# Patient Record
Sex: FEMALE | Race: BLACK OR AFRICAN AMERICAN | Employment: FULL TIME | URBAN - METROPOLITAN AREA
[De-identification: names, ages, dates, MRNs, and addresses within clinical notes are randomized per-mention and may not be internally consistent; named-entity substitution may affect disease eponyms.]

---

## 2017-01-27 DIAGNOSIS — M25.562 CHRONIC PAIN OF BOTH KNEES: ICD-10-CM

## 2017-01-27 DIAGNOSIS — G89.29 CHRONIC PAIN OF BOTH KNEES: ICD-10-CM

## 2017-01-27 DIAGNOSIS — M25.561 CHRONIC PAIN OF BOTH KNEES: ICD-10-CM

## 2017-01-29 DIAGNOSIS — M25.561 CHRONIC PAIN OF BOTH KNEES: ICD-10-CM

## 2017-01-29 DIAGNOSIS — M25.562 CHRONIC PAIN OF BOTH KNEES: ICD-10-CM

## 2017-01-29 DIAGNOSIS — G89.29 CHRONIC PAIN OF BOTH KNEES: ICD-10-CM

## 2017-01-30 RX ORDER — MELOXICAM 7.5 MG/1
TABLET ORAL
Qty: 60 TAB | Refills: 0 | Status: SHIPPED | OUTPATIENT
Start: 2017-01-30 | End: 2017-05-05 | Stop reason: SDUPTHER

## 2017-01-30 RX ORDER — MELOXICAM 7.5 MG/1
TABLET ORAL
Qty: 60 TAB | Refills: 0 | Status: SHIPPED | OUTPATIENT
Start: 2017-01-30 | End: 2019-06-02

## 2017-02-15 ENCOUNTER — OFFICE VISIT (OUTPATIENT)
Dept: ORTHOPEDIC SURGERY | Facility: CLINIC | Age: 52
End: 2017-02-15

## 2017-02-15 VITALS
TEMPERATURE: 97.8 F | BODY MASS INDEX: 39.88 KG/M2 | WEIGHT: 190 LBS | DIASTOLIC BLOOD PRESSURE: 80 MMHG | HEIGHT: 58 IN | HEART RATE: 89 BPM | SYSTOLIC BLOOD PRESSURE: 153 MMHG

## 2017-02-15 DIAGNOSIS — M17.0 PRIMARY OSTEOARTHRITIS OF BOTH KNEES: Primary | ICD-10-CM

## 2017-02-15 DIAGNOSIS — M17.11 ARTHRITIS OF KNEE, RIGHT: ICD-10-CM

## 2017-02-15 RX ORDER — LIDOCAINE HYDROCHLORIDE 10 MG/ML
6 INJECTION INFILTRATION; PERINEURAL ONCE
Qty: 6 ML | Refills: 0
Start: 2017-02-15 | End: 2017-02-15

## 2017-02-15 RX ORDER — TRIAMCINOLONE ACETONIDE 40 MG/ML
60 INJECTION, SUSPENSION INTRA-ARTICULAR; INTRAMUSCULAR ONCE
Qty: 2 ML | Refills: 0
Start: 2017-02-15 | End: 2017-02-15

## 2017-02-15 NOTE — PROGRESS NOTES
Patient: Paradise Taylor                MRN: [id number]       SSN: [id number]  YOB: 1965        AGE: 54 y.o. SEX: female  There is no height or weight on file to calculate BMI. PCP: Cyndee Berg MD  02/15/17      No chief complaint on file. HISTORY OF PRESENT ILLNESS: Paradise Taylor is a 54 y.o. female who presents to the office for continued in both knees but mostly in the right knee. She states the Tramadol and Motrin are no longer working. It is to be remembered she has significant medial knee arthritis seen on x-ray taken 1.5 years ago. Review of Systems   Constitutional: Negative. HENT: Negative. Eyes: Negative. Respiratory: Negative. Cardiovascular: Negative. Gastrointestinal: Negative. Genitourinary: Negative. Musculoskeletal: Positive for joint pain (bilateral knees). Skin: Negative. Neurological: Negative. Endo/Heme/Allergies: Negative. Psychiatric/Behavioral: Negative. Social History     Social History    Marital status: SINGLE     Spouse name: N/A    Number of children: N/A    Years of education: N/A     Occupational History    Not on file.      Social History Main Topics    Smoking status: Current Every Day Smoker     Packs/day: 0.25     Years: 15.00    Smokeless tobacco: Never Used    Alcohol use Yes      Comment: occasionally    Drug use: No    Sexual activity: Not on file     Other Topics Concern    Not on file     Social History Narrative        Past Medical History   Diagnosis Date    Osteoarthritis     Ulcer ([address]      gi ulcer        Allergies   Allergen Reactions    Latex Rash    Penicillins Other (comments)         Current Outpatient Prescriptions   Medication Sig    meloxicam (MOBIC) 7.5 mg tablet TAKE ONE TABLET BY MOUTH TWICE DAILY WITH MEALS    meloxicam (MOBIC) 7.5 mg tablet TAKE ONE TABLET BY MOUTH TWICE DAILY WITH MEALS    traMADol (ULTRAM) 50 mg tablet Take 1 Tab by mouth every six (6) hours as needed for Pain. Max Daily Amount: 200 mg.  acetaminophen-codeine (TYLENOL-CODEINE #3) 300-30 mg per tablet Take 1 Tab by mouth every six (6) hours as needed for Pain. Max Daily Amount: 4 Tabs.  traMADol (ULTRAM) 50 mg tablet Take 50 mg by mouth every six (6) hours as needed for Pain.  colchicine 0.6 mg tablet Take 1 Tab by mouth daily. Take 3 times daily on day 1. Then twice daily on days two and three.  omeprazole (PRILOSEC) 20 mg capsule Take 20 mg by mouth daily.  ferrous sulfate (IRON) 325 mg (65 mg iron) EC tablet Take 1 Tab by mouth two (2) times daily (with meals). No current facility-administered medications for this visit. Physical Exam  Constitutional: she is oriented to person, place, and time and well-developed, well-nourished, and in no distress. No distress. HENT:   Head: Normocephalic and atraumatic. Right Ear: Hearing normal.   Left Ear: Hearing normal.   Nose: Nose normal.   Eyes: Conjunctivae, EOM and lids are normal. Pupils are equal, round, and reactive to light. Neck: Trachea normal.   Pulmonary/Chest: Effort normal and breath sounds normal. No respiratory distress. Abdominal: Soft. Neurological: she is alert and oriented to person, place, and time. Skin: Skin is warm, dry and intact. she is not diaphoretic. Psychiatric: Affect normal.   Nursing note and vitals reviewed. Ortho Exam   Knees shows soft tissue swelling greater on the left than the left. ROM of the knee is normal.     Procedure: After timeout and under sterile conditions, patient's right knee was injected with 6 cc of Xylocaine and 1.5 cc of Kenalog.     [facility]  OFFICE PROCEDURE PROGRESS NOTE        Chart reviewed for the following:   Wale Barone MD, have reviewed the History, Physical and updated the Allergic reactions for [facility]     TIME OUT performed immediately prior to start of procedure:   Wale Barone MD, have performed the following reviews on Bandar Diaz prior to the start of the procedure:            * Patient was identified by name and date of birth   * Agreement on procedure being performed was verified  * Risks and Benefits explained to the patient  * Procedure site verified and marked as necessary  * Patient was positioned for comfort  * Consent was signed and verified     Time: 9:25 AM        Date of procedure: 2/15/2017    Procedure performed by: Manju Hussein MD    Provider assisted by: None     Patient assisted by: self    How tolerated by patient: tolerated the procedure well with no complications    Comments: none      RADIOGRAPHS:   No new x-rays taken today. IMPRESSION & PLAN: I feel the source of the pain is the OA. We will try a cortisone injection to see if this controls her pain. If not, she may have to be referred to pain management for stronger medication since at 54years old she is not ideal for knee replacement surgery. I will see her back prn.       Scribed by Vadim Wild ([address]) as dictated by Getachew Gray MD.

## 2017-02-16 ENCOUNTER — TELEPHONE (OUTPATIENT)
Dept: ORTHOPEDIC SURGERY | Age: 52
End: 2017-02-16

## 2017-02-16 NOTE — TELEPHONE ENCOUNTER
pt calling in requesting that dr Breanna Romero give her a letter stating that she will be out of work for 2 days regarding the shot that she just received. Pt wants to see if there is a certain way that the letter can be written, please call the patient and advise, thank you.

## 2017-02-20 NOTE — TELEPHONE ENCOUNTER
Per Sue Martinez, he will NOT give her a note for working on the floor 1 day a week and to get a MRI if she is having that much pain. I left a  VM for her with these instructions.

## 2017-02-20 NOTE — TELEPHONE ENCOUNTER
Patient is requesting a work note stating that she may only work on the floor 1 day a week. 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

## 2017-04-10 ENCOUNTER — OFFICE VISIT (OUTPATIENT)
Dept: ORTHOPEDIC SURGERY | Facility: CLINIC | Age: 52
End: 2017-04-10

## 2017-04-10 VITALS
HEART RATE: 77 BPM | WEIGHT: 185 LBS | SYSTOLIC BLOOD PRESSURE: 154 MMHG | BODY MASS INDEX: 38.83 KG/M2 | DIASTOLIC BLOOD PRESSURE: 65 MMHG | HEIGHT: 58 IN | TEMPERATURE: 98.1 F

## 2017-04-10 DIAGNOSIS — M25.561 CHRONIC PAIN OF BOTH KNEES: ICD-10-CM

## 2017-04-10 DIAGNOSIS — G89.29 CHRONIC PAIN OF BOTH KNEES: ICD-10-CM

## 2017-04-10 DIAGNOSIS — M25.461 KNEE EFFUSION, RIGHT: ICD-10-CM

## 2017-04-10 DIAGNOSIS — M17.11 PRIMARY OSTEOARTHRITIS OF RIGHT KNEE: Primary | ICD-10-CM

## 2017-04-10 DIAGNOSIS — M25.562 CHRONIC PAIN OF BOTH KNEES: ICD-10-CM

## 2017-04-10 DIAGNOSIS — M25.462 KNEE EFFUSION, LEFT: ICD-10-CM

## 2017-04-10 DIAGNOSIS — M17.12 PRIMARY OSTEOARTHRITIS OF LEFT KNEE: ICD-10-CM

## 2017-04-10 DIAGNOSIS — E66.9 OBESITY (BMI 35.0-39.9 WITHOUT COMORBIDITY): ICD-10-CM

## 2017-04-10 RX ORDER — BUPIVACAINE HYDROCHLORIDE 2.5 MG/ML
4 INJECTION, SOLUTION EPIDURAL; INFILTRATION; INTRACAUDAL ONCE
Qty: 4 ML | Refills: 0
Start: 2017-04-10 | End: 2017-04-10

## 2017-04-10 RX ORDER — BETAMETHASONE SODIUM PHOSPHATE AND BETAMETHASONE ACETATE 3; 3 MG/ML; MG/ML
3 INJECTION, SUSPENSION INTRA-ARTICULAR; INTRALESIONAL; INTRAMUSCULAR; SOFT TISSUE ONCE
Qty: 0.5 ML | Refills: 0
Start: 2017-04-10 | End: 2017-04-10

## 2017-04-10 RX ORDER — BUPIVACAINE HYDROCHLORIDE 2.5 MG/ML
10 INJECTION, SOLUTION EPIDURAL; INFILTRATION; INTRACAUDAL ONCE
Qty: 10 ML | Refills: 0
Start: 2017-04-10 | End: 2017-04-10

## 2017-04-10 NOTE — PROGRESS NOTES
Patient: Juanjose Dixon                MRN: [id number]       SSN: [id number]  YOB: 1965        AGE: 54 y.o. SEX: female    PCP: Josefina Goetz MD  04/10/17    Chief Complaint   Patient presents with    Knee Pain     tereza nx     HISTORY:  Juanjose Dixon is a 54 y.o. female who is seen for bilateral knee (R>L) pain and swelling. She was previously seen by Dr. Kirstin Villa in February of 2017 who gave her a knee cortisone injection with temporary benefit and prescribed Meloxicam.  She did not wish to be seen by him again since she felt like he was not listening to her. She notes increased bilateral knee pain for about a year. She notes pain with standing and walking. 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 She has high blood pressure. She is not diabetic. She reports that she has lost 25 pounds recently. Current weight is 185 pounds. She is 4'10\" tall. She lives alone in [place]. She says that her 80-year-old daughter lives across the street. She has a 51-year-old son who lives in [place], and she has a 80-year-old son who is in the [employer] and getting ready to be stationed in [place], [place] in the near future. She plans to move closer to him in July of 2017. Her oldest son has three children and her daughter has five children.       Weight Metrics 4/10/2017 2/15/2017 9/13/2016 9/12/2016 6/27/2016 6/8/2016 4/13/2016   Weight 185 lb 190 lb - 190 lb 190 lb 190 lb 196 lb   BMI 38.67 kg/m2 39.71 kg/m2 39.71 kg/m2 - 39.72 kg/m2 39.72 kg/m2 40.98 kg/m2     Patient Active Problem List   Diagnosis Code    Acute pharyngitis J02.9    Obesity (BMI 35.0-39.9 without comorbidity) ([facility] [address] E66.9     REVIEW OF SYSTEMS: All Below are Negative except: See HPI   Constitutional: negative for fever, chills, and weight loss. Cardiovascular: negative for chest pain, claudication, leg swelling, SOB, FLORES   Gastrointestinal: Negative for pain, N/V/C/D, Blood in stool or urine, dysuria,  hematuria, incontinence, pelvic pain. Musculoskeletal: See HPI   Neurological: Negative for dizziness and weakness. Negative for headaches, Visual changes, confusion, seizures   Phychiatric/Behavioral: Negative for depression, memory loss, substance  abuse. Extremities: Negative for hair changes, rash, or skin lesion changes. Hematologic: Negative for bleeding problems, bruising, pallor or swollen lymph  nodes   Peripheral Vascular: No calf pain, no circulation deficits. Social History     Social History    Marital status: SINGLE     Spouse name: N/A    Number of children: N/A    Years of education: N/A     Occupational History    Not on file. Social History Main Topics    Smoking status: Current Every Day Smoker     Packs/day: 0.25     Years: 15.00    Smokeless tobacco: Never Used    Alcohol use Yes      Comment: occasionally    Drug use: No    Sexual activity: Not on file     Other Topics Concern    Not on file     Social History Narrative      Allergies   Allergen Reactions    Latex Rash    Penicillins Other (comments)      Current Outpatient Prescriptions   Medication Sig    meloxicam (MOBIC) 7.5 mg tablet TAKE ONE TABLET BY MOUTH TWICE DAILY WITH MEALS    meloxicam (MOBIC) 7.5 mg tablet TAKE ONE TABLET BY MOUTH TWICE DAILY WITH MEALS    traMADol (ULTRAM) 50 mg tablet Take 1 Tab by mouth every six (6) hours as needed for Pain. Max Daily Amount: 200 mg.  acetaminophen-codeine (TYLENOL-CODEINE #3) 300-30 mg per tablet Take 1 Tab by mouth every six (6) hours as needed for Pain. Max Daily Amount: 4 Tabs.     traMADol (ULTRAM) 50 mg tablet Take 50 mg by mouth every six (6) hours as needed for Pain.  colchicine 0.6 mg tablet Take 1 Tab by mouth daily. Take 3 times daily on day 1. Then twice daily on days two and three.  omeprazole (PRILOSEC) 20 mg capsule Take 20 mg by mouth daily.  ferrous sulfate (IRON) 325 mg (65 mg iron) EC tablet Take 1 Tab by mouth two (2) times daily (with meals). No current facility-administered medications for this visit. PHYSICAL EXAMINATION:  Visit Vitals    /65    Pulse 77    Temp 98.1 °F (36.7 °C)    Ht 4' 10\" (1.473 m)    Wt 185 lb (83.9 kg)    BMI 38.67 kg/m2      ORTHO EXAMINATION:  Examination Right knee Left knee   Skin Intact Intact   Range of motion 100-0 100-0   Effusion + -   Medial joint line tenderness + +   Lateral joint line tenderness - -   Popliteal tenderness - -   Osteophytes palpable - -   Rajnis - -   Patella crepitus - -   Anterior drawer - -   Lateral laxity - -   Medial laxity - -   Varus deformity +, mild -   Valgus deformity - +, mild   Pretibial edema - -   Calf tenderness - -     PROCEDURE:  After timeout and under sterile conditions, Ms. Jeanne Mahmood had her right knee aspirated 35 cc of light yellow blood-tinged fluid. The fluid was discarded. After discussing treatment options, patient's knees were injected with 4 cc Marcaine and 1/2 cc Celestone.     Chart reviewed for the following:   Amparo Munroe MD, have reviewed the History, Physical and updated the Allergic reactions for Jess Willard performed immediately prior to start of procedure:  Amparo Munroe MD, have performed the following reviews on Jess Rose prior to the start of the procedure:            * Patient was identified by name and date of birth   * Agreement on procedure being performed was verified  * Risks and Benefits explained to the patient  * Procedure site verified and marked as necessary  * Patient was positioned for comfort  * Consent was obtained     Time: 8:50 AM     Date of procedure: 4/10/2017    Procedure performed by:  Mendel Mercer MD    Ms. Rose tolerated the procedure well with no complications. RADIOGRAPHS:  XR MICHAELLE KNEES 5/26/15  IMPRESSION:  No fractures, no effusion, severe medial joint space narrowing on the right, severe lateral joint space narrowing on the left, no osteophytes present. XR LEFT KNEE 3/3/15  IMPRESSION:  Moderate suprapatellar joint effusion. XR RIGHT KNEE 7/3/13  IMPRESSION:  Moderate changes of osteoarthritis. Large suprapatellar effusion. IMPRESSION:      ICD-10-CM ICD-9-CM    1. Primary osteoarthritis of right knee M17.11 715.16 betamethasone (CELESTONE SOLUSPAN) 6 mg/mL injection      BETAMETHASONE ACETATE & SODIUM PHOSPHATE INJECTION 3 MG EA.      DRAIN/INJECT LARGE JOINT/BURSA      bupivacaine, PF, (MARCAINE, PF,) 0.25 % (2.5 mg/mL) injection      bupivacaine, PF, (MARCAINE, PF,) 0.25 % (2.5 mg/mL) injection      PROCEDURE AUTHORIZATION TO     Severe, medial compartment   2. Chronic pain of both knees M25.561 719.46 betamethasone (CELESTONE SOLUSPAN) 6 mg/mL injection    M25.562 338.29 BETAMETHASONE ACETATE & SODIUM PHOSPHATE INJECTION 3 MG EA.    G89.29  DRAIN/INJECT LARGE JOINT/BURSA      bupivacaine, PF, (MARCAINE, PF,) 0.25 % (2.5 mg/mL) injection      bupivacaine, PF, (MARCAINE, PF,) 0.25 % (2.5 mg/mL) injection      betamethasone (CELESTONE SOLUSPAN) 6 mg/mL injection      BETAMETHASONE ACETATE & SODIUM PHOSPHATE INJECTION 3 MG EA.      DRAIN/INJECT LARGE JOINT/BURSA      bupivacaine, PF, (MARCAINE, PF,) 0.25 % (2.5 mg/mL) injection   3. Knee effusion, right M25.461 719.06 betamethasone (CELESTONE SOLUSPAN) 6 mg/mL injection      BETAMETHASONE ACETATE & SODIUM PHOSPHATE INJECTION 3 MG EA.      DRAIN/INJECT LARGE JOINT/BURSA      bupivacaine, PF, (MARCAINE, PF,) 0.25 % (2.5 mg/mL) injection      bupivacaine, PF, (MARCAINE, PF,) 0.25 % (2.5 mg/mL) injection    Large, suprapatellar   4.  Knee effusion, left M25.462 719.06     Moderate, suprapatellar   5. Knee effusion, right M25.461 719.06 betamethasone (CELESTONE SOLUSPAN) 6 mg/mL injection      BETAMETHASONE ACETATE & SODIUM PHOSPHATE INJECTION 3 MG EA.      DRAIN/INJECT LARGE JOINT/BURSA      bupivacaine, PF, (MARCAINE, PF,) 0.25 % (2.5 mg/mL) injection      bupivacaine, PF, (MARCAINE, PF,) 0.25 % (2.5 mg/mL) injection   6. Primary osteoarthritis of left knee M17.12 715.16 betamethasone (CELESTONE SOLUSPAN) 6 mg/mL injection      BETAMETHASONE ACETATE & SODIUM PHOSPHATE INJECTION 3 MG EA.      DRAIN/INJECT LARGE JOINT/BURSA      bupivacaine, PF, (MARCAINE, PF,) 0.25 % (2.5 mg/mL) injection      PROCEDURE AUTHORIZATION TO     Severe, lateral compartment   7. Obesity (BMI 35.0-39.9 without comorbidity) ([facility] E66.9 278.00      PLAN:  After timeout and under sterile conditions, Ms. Mosie Dakins had her right knee aspirated 35 cc of light yellow blood-tinged fluid. The fluid was discarded. After discussing treatment options, patient's knees were injected with 4 cc Marcaine and 1/2 cc Celestone. I will see her back as needed. Consider visco supplementation if pain continues. We discussed possible need for right knee arthroplasty potentially unicompartmental at some time in the future.       Scribed by [employer]PAL ([address]) as dictated by Brian Kelly MD

## 2017-04-10 NOTE — PATIENT INSTRUCTIONS
Knee Arthritis: Exercises  Your Care Instructions  Here are some examples of exercises for knee arthritis. Start each exercise slowly. Ease off the exercise if you start to have pain. Your doctor or physical therapist will tell you when you can start these exercises and which ones will work best for you. How to do the exercises  Knee flexion with heel slide    1. Lie on your back with your knees bent. 2. Slide your heel back by bending your affected knee as far as you can. Then hook your other foot around your ankle to help pull your heel even farther back. 3. Hold for about 6 seconds, then rest for up to 10 seconds. 4. Repeat 8 to 12 times. 5. Switch legs and repeat steps 1 through 4, even if only one knee is sore. Quad sets    1. Sit with your affected leg straight and supported on the floor or a firm bed. Place a small, rolled-up towel under your knee. Your other leg should be bent, with that foot flat on the floor. 2. Tighten the thigh muscles of your affected leg by pressing the back of your knee down into the towel. 3. Hold for about 6 seconds, then rest for up to 10 seconds. 4. Repeat 8 to 12 times. 5. Switch legs and repeat steps 1 through 4, even if only one knee is sore. Straight-leg raises to the front    1. Lie on your back with your good knee bent so that your foot rests flat on the floor. Your affected leg should be straight. Make sure that your low back has a normal curve. You should be able to slip your hand in between the floor and the small of your back, with your palm touching the floor and your back touching the back of your hand. 2. Tighten the thigh muscles in your affected leg by pressing the back of your knee flat down to the floor. Hold your knee straight. 3. Keeping the thigh muscles tight and your leg straight, lift your affected leg up so that your heel is about 12 inches off the floor. Hold for about 6 seconds, then lower slowly.   4. Relax for up to 10 seconds between repetitions. 5. Repeat 8 to 12 times. 6. Switch legs and repeat steps 1 through 5, even if only one knee is sore. Active knee flexion    1. Lie on your stomach with your knees straight. If your kneecap is uncomfortable, roll up a washcloth and put it under your leg just above your kneecap. 2. Lift the foot of your affected leg by bending the knee so that you bring the foot up toward your buttock. If this motion hurts, try it without bending your knee quite as far. This may help you avoid any painful motion. 3. Slowly move your leg up and down. 4. Repeat 8 to 12 times. 5. Switch legs and repeat steps 1 through 4, even if only one knee is sore. Quadriceps stretch (facedown)    1. Lie flat on your stomach, and rest your face on the floor. 2. Wrap a towel or belt strap around the lower part of your affected leg. Then use the towel or belt strap to slowly pull your heel toward your buttock until you feel a stretch. 3. Hold for about 15 to 30 seconds, then relax your leg against the towel or belt strap. 4. Repeat 2 to 4 times. 5. Switch legs and repeat steps 1 through 4, even if only one knee is sore. Stationary exercise bike    If you do not have a stationary exercise bike at home, you can find one to ride at your local health club or community center. 1. Adjust the height of the bike seat so that your knee is slightly bent when your leg is extended downward. If your knee hurts when the pedal reaches the top, you can raise the seat so that your knee does not bend as much. 2. Start slowly. At first, try to do 5 to 10 minutes of cycling with little to no resistance. Then increase your time and the resistance bit by bit until you can do 20 to 30 minutes without pain. 3. If you start to have pain, rest your knee until your pain gets back to the level that is normal for you. Or cycle for less time or with less effort. Follow-up care is a key part of your treatment and safety.  Be sure to make and go to all appointments, and call your doctor if you are having problems. It's also a good idea to know your test results and keep a list of the medicines you take. Where can you learn more? Go to http://macy-ana m.info/. Enter C159 in the search box to learn more about \"Knee Arthritis: Exercises. \"  Current as of: May 23, 2016  Content Version: 11.2  © 20062725-0295 [employer]. Care instructions adapted under license by [employer] (which disclaims liability or warranty for this information). If you have questions about a medical condition or this instruction, always ask your healthcare professional. [place] [address] any warranty or liability for your use of this information. Joint Injections: Care Instructions  Your Care Instructions  Joint injections are shots into a joint, such as the knee. They may be used to put in medicines, such as pain relievers. Or they can be used to take out fluid. Sometimes the fluid is tested in a lab. This can help find the cause of a joint problem. A corticosteroid, or steroid, shot is used to reduce inflammation in tendons or joints. It is often used to treat problems such as arthritis, tendinitis, and bursitis. Steroids can be injected directly into a painful, inflamed joint. They can also help reduce inflammation of a bursa. A bursa is a sac of fluid. It cushions and lubricates areas where tendons, ligaments, skin, muscles, or bones rub against each other. A steroid shot can sometimes help with short-term pain relief when other treatments haven't worked. If steroid shots help, pain may improve for weeks or months. Follow-up care is a key part of your treatment and safety. Be sure to make and go to all appointments, and call your doctor if you are having problems. It's also a good idea to know your test results and keep a list of the medicines you take. How can you care for yourself at home?   · Put ice or a cold pack on the area for 10 to 20 minutes at a time. Put a thin cloth between the ice and your skin. · Take anti-inflammatory medicines to reduce pain, swelling, or inflammation. These include ibuprofen (Advil, Motrin) and naproxen (Aleve). Read and follow all instructions on the label. · Avoid strenuous activities for several days, especially those that put stress on the area where you got the shot. · If you have dressings over the area, keep them clean and dry. You may remove them when your doctor tells you to. When should you call for help? Call your doctor now or seek immediate medical care if:  · You have signs of infection, such as:  ¨ Increased pain, swelling, warmth, or redness. ¨ Red streaks leading from the site. ¨ Pus draining from the site. ¨ A fever. Watch closely for changes in your health, and be sure to contact your doctor if you have any problems. Where can you learn more? Go to http://macy-ana m.info/. Enter N616 in the search box to learn more about \"Joint Injections: Care Instructions. \"  Current as of: May 23, 2016  Content Version: 11.2  © 9232-3992 [employer]. Care instructions adapted under license by [employer] (which disclaims liability or warranty for this information). If you have questions about a medical condition or this instruction, always ask your healthcare professional. [address] any warranty or liability for your use of this information.

## 2017-04-10 NOTE — MR AVS SNAPSHOT
Visit Information Date & Time Provider Department Dept. Phone Encounter #  
 4/10/2017  8:30 AM Rl Gauthier MD [facility] [id number] Follow-up Instructions Return if symptoms worsen or fail to improve. Upcoming Health Maintenance Date Due Pneumococcal 19-64 Medium Risk (1 of 1 - PPSV23) 12/26/1986 DTaP/Tdap/Td series (1 - Tdap) 12/26/1988 PAP AKA CERVICAL CYTOLOGY 12/26/1988 INFLUENZA AGE 9 TO ADULT 8/1/2016 Allergies as of 4/10/2017  Review Complete On: 4/10/2017 By: Rl Gauthier MD  
  
 Severity Noted Reaction Type Reactions Latex  09/18/2012   Topical Rash Penicillins  10/25/2014    Other (comments) Current Immunizations  Never Reviewed No immunizations on file. Not reviewed this visit You Were Diagnosed With   
  
 Codes Comments Primary osteoarthritis of right knee    -  Primary ICD-10-CM: M17.11 ICD-9-CM: 715.16 Severe, medial compartment Chronic pain of both knees     ICD-10-CM: M25.561, M25.562, G89.29 ICD-9-CM: 719.46, 338.29 Knee effusion, right     ICD-10-CM: M25.461 ICD-9-CM: 719.06 Large, suprapatellar Knee effusion, left     ICD-10-CM: M25.462 ICD-9-CM: 719.06 Moderate, suprapatellar Knee effusion, right     ICD-10-CM: M25.461 ICD-9-CM: 719.06   
 Primary osteoarthritis of left knee     ICD-10-CM: M17.12 
ICD-9-CM: 715.16 Severe, lateral compartment Obesity (BMI 35.0-39.9 without comorbidity) ([facility] [address]     ICD-10-CM: Q98.1 ICD-9-CM: 278.00 Vitals BP Pulse Temp Height(growth percentile) Weight(growth percentile) BMI  
 154/65 77 98.1 °F (36.7 °C) 4' 10\" (1.473 m) 185 lb (83.9 kg) 38.67 kg/m2 OB Status Smoking Status Having regular periods Current Every Day Smoker Vitals History BMI and BSA Data Body Mass Index Body Surface Area  
 38.67 kg/m 2 1.85 m 2 Preferred Pharmacy Pharm[facility] Name Phone [facility] [phone] Your Updated Medication List  
  
   
This list is accurate as of: 4/10/17  9:00 AM.  Always use your most recent med list.  
  
  
  
  
 acetaminophen-codeine 300-30 mg per tablet Commonly known as:  TYLENOL-CODEINE #3 Take 1 Tab by mouth every six (6) hours as needed for Pain. Max Daily Amount: 4 Tabs. * betamethasone 6 mg/mL injection Commonly known as:  CELESTONE SOLUSPAN  
0.5 mL by Intra artICUlar route once for 1 dose. * betamethasone 6 mg/mL injection Commonly known as:  CELESTONE SOLUSPAN  
0.5 mL by Intra artICUlar route once for 1 dose. * bupivacaine (PF) 0.25 % (2.5 mg/mL) injection Commonly known as:  MARCAINE (PF)  
4 mL by Intra artICUlar route once for 1 dose. * bupivacaine (PF) 0.25 % (2.5 mg/mL) injection Commonly known as:  MARCAINE (PF) 10 mL by Intra artICUlar route once for 1 dose. * bupivacaine (PF) 0.25 % (2.5 mg/mL) injection Commonly known as:  MARCAINE (PF)  
4 mL by Intra artICUlar route once for 1 dose. colchicine 0.6 mg tablet Take 1 Tab by mouth daily. Take 3 times daily on day 1. Then twice daily on days two and three. ferrous sulfate 325 mg (65 mg iron) EC tablet Commonly known as:  IRON Take 1 Tab by mouth two (2) times daily (with meals). * meloxicam 7.5 mg tablet Commonly known as:  MOBIC  
TAKE ONE TABLET BY MOUTH TWICE DAILY WITH MEALS  
  
 * meloxicam 7.5 mg tablet Commonly known as:  MOBIC  
TAKE ONE TABLET BY MOUTH TWICE DAILY WITH MEALS PriLOSEC 20 mg capsule Generic drug:  omeprazole Take 20 mg by mouth daily. * traMADol 50 mg tablet Commonly known as:  ULTRAM  
Take 50 mg by mouth every six (6) hours as needed for Pain. * traMADol 50 mg tablet Commonly known as:  ULTRAM  
Take 1 Tab by mouth every six (6) hours as needed for Pain. Max Daily Amount: 200 mg. * Notice: This list has 9 medication(s) that are the same as other medications prescribed for you. Read the directions carefully, and ask your doctor or other care provider to review them with you. We Performed the Following BETAMETHASONE ACETATE & SODIUM PHOSPHATE INJECTION 3 MG EA. [ [facility]] BETAMETHASONE ACETATE & SODIUM PHOSPHATE INJECTION 3 MG EA. [ [facility]] DRAIN/INJECT LARGE JOINT/BURSA [id number] CPT(R)] DRAIN/INJECT LARGE JOINT/BURSA [id number] CPT(R)] PROCEDURE AUTHORIZATION TO  [HBH6501 Custom] Comments:  
 Need authorization for Euflexxa, bilateral knees. INDIGENT PROGRAM. Follow-up Instructions Return if symptoms worsen or fail to improve. Patient Instructions Knee Arthritis: Exercises Your Care Instructions Here are some examples of exercises for knee arthritis. Start each exercise slowly. Ease off the exercise if you start to have pain. Your doctor or physical therapist will tell you when you can start these exercises and which ones will work best for you. How to do the exercises Knee flexion with heel slide 1. Lie on your back with your knees bent. 2. Slide your heel back by bending your affected knee as far as you can. Then hook your other foot around your ankle to help pull your heel even farther back. 3. Hold for about 6 seconds, then rest for up to 10 seconds. 4. Repeat 8 to 12 times. 5. Switch legs and repeat steps 1 through 4, even if only one knee is sore. [facility] 1. Sit with your affected leg straight and supported on the floor or a firm bed. Place a small, rolled-up towel under your knee. Your other leg should be bent, with that foot flat on the floor. 2. Tighten the thigh muscles of your affected leg by pressing the back of your knee down into the towel. 3. Hold for about 6 seconds, then rest for up to 10 seconds. 4. Repeat 8 to 12 times. 5. Switch legs and repeat steps 1 through 4, even if only one knee is sore. Straight-leg raises to the front 1. Lie on your back with your good knee bent so that your foot rests flat on the floor. Your affected leg should be straight. Make sure that your low back has a normal curve. You should be able to slip your hand in between the floor and the small of your back, with your palm touching the floor and your back touching the back of your hand. 2. Tighten the thigh muscles in your affected leg by pressing the back of your knee flat down to the floor. Hold your knee straight. 3. Keeping the thigh muscles tight and your leg straight, lift your affected leg up so that your heel is about 12 inches off the floor. Hold for about 6 seconds, then lower slowly. 4. Relax for up to 10 seconds between repetitions. 5. Repeat 8 to 12 times. 6. Switch legs and repeat steps 1 through 5, even if only one knee is sore. Active knee flexion 1. Lie on your stomach with your knees straight. If your kneecap is uncomfortable, roll up a washcloth and put it under your leg just above your kneecap. 2. Lift the foot of your affected leg by bending the knee so that you bring the foot up toward your buttock. If this motion hurts, try it without bending your knee quite as far. This may help you avoid any painful motion. 3. Slowly move your leg up and down. 4. Repeat 8 to 12 times. 5. Switch legs and repeat steps 1 through 4, even if only one knee is sore. Quadriceps stretch (facedown) 1. Lie flat on your stomach, and rest your face on the floor. 2. Wrap a towel or belt strap around the lower part of your affected leg. Then use the towel or belt strap to slowly pull your heel toward your buttock until you feel a stretch. 3. Hold for about 15 to 30 seconds, then relax your leg against the towel or belt strap. 4. Repeat 2 to 4 times. 5. Switch legs and repeat steps 1 through 4, even if only one knee is sore. Stationary exercise bike If you do not have a stationary exercise bike at home, you can find one to ride at your local health club or community center. 1. Adjust the height of the bike seat so that your knee is slightly bent when your leg is extended downward. If your knee hurts when the pedal reaches the top, you can raise the seat so that your knee does not bend as much. 2. Start slowly. At first, try to do 5 to 10 minutes of cycling with little to no resistance. Then increase your time and the resistance bit by bit until you can do 20 to 30 minutes without pain. 3. If you start to have pain, rest your knee until your pain gets back to the level that is normal for you. Or cycle for less time or with less effort. Follow-up care is a key part of your treatment and safety. Be sure to make and go to all appointments, and call your doctor if you are having problems. It's also a good idea to know your test results and keep a list of the medicines you take. Where can you learn more? Go to http://macy-ana m.info/. Enter C159 in the search box to learn more about \"Knee Arthritis: Exercises. \" Current as of: May 23, 2016 Content Version: 11.2 © 8927-7282 [employer]. Care instructions adapted under license by [employer] (which disclaims liability or warranty for this information). If you have questions about a medical condition or this instruction, always ask your healthcare professional. [place] [address] any warranty or liability for your use of this information. Joint Injections: Care Instructions Your Care Instructions Joint injections are shots into a joint, such as the knee. They may be used to put in medicines, such as pain relievers. Or they can be used to take out fluid. Sometimes the fluid is tested in a lab. This can help find the cause of a joint problem.  
A corticosteroid, or steroid, shot is used to reduce inflammation in tendons or joints. It is often used to treat problems such as arthritis, tendinitis, and bursitis. Steroids can be injected directly into a painful, inflamed joint. They can also help reduce inflammation of a bursa. A bursa is a sac of fluid. It cushions and lubricates areas where tendons, ligaments, skin, muscles, or bones rub against each other. A steroid shot can sometimes help with short-term pain relief when other treatments haven't worked. If steroid shots help, pain may improve for weeks or months. Follow-up care is a key part of your treatment and safety. Be sure to make and go to all appointments, and call your doctor if you are having problems. It's also a good idea to know your test results and keep a list of the medicines you take. How can you care for yourself at home? · Put ice or a cold pack on the area for 10 to 20 minutes at a time. Put a thin cloth between the ice and your skin. · Take anti-inflammatory medicines to reduce pain, swelling, or inflammation. These include ibuprofen (Advil, Motrin) and naproxen (Aleve). Read and follow all instructions on the label. · Avoid strenuous activities for several days, especially those that put stress on the area where you got the shot. · If you have dressings over the area, keep them clean and dry. You may remove them when your doctor tells you to. When should you call for help? Call your doctor now or seek immediate medical care if: 
· You have signs of infection, such as: 
¨ Increased pain, swelling, warmth, or redness. ¨ Red streaks leading from the site. ¨ Pus draining from the site. ¨ A fever. Watch closely for changes in your health, and be sure to contact your doctor if you have any problems. Where can you learn more? Go to http://macy-ana m.info/. Enter N616 in the search box to learn more about \"Joint Injections: Care Instructions. \" Current as of: May 23, 2016 Content Version: 11.2 © 4438-9768 [employer]. Care instructions adapted under license by [employer]d (which disclaims liability or warranty for this information). If you have questions about a medical condition or this instruction, always ask your healthcare professional. [address] any warranty or liability for your use of this information. Introducing [facility]! [facility] introduces Nexant patient portal. Now you can access parts of your medical record, email your doctor's office, and request medication refills online. 1. In your internet browser, go to [email or web address] 2. Click on the First Time User? Click Here link in the Sign In box. You will see the New Member Sign Up page. 3. Enter your Nexant Access Code exactly as it appears below. You will not need to use this code after youve completed the sign-up process. If you do not sign up before the expiration date, you must request a new code. · Nexant Access Code: [email or web address] Expires: 7/9/2017  8:32 AM 
 
4. Enter the last four digits of your Social Security Number (xxxx) and Date of Birth (mm/dd/yyyy) as indicated and click Submit. You will be taken to the next sign-up page. 5. Create a Nexant ID. This will be your Nexant login ID and cannot be changed, so think of one that is secure and easy to remember. 6. Create a Nexant password. You can change your password at any time. 7. Enter your Password Reset Question and Answer. This can be used at a later time if you forget your password. 8. Enter your e-mail address. You will receive e-mail notification when new information is available in [address] 9. Click Sign Up. You can now view and download portions of your medical record. 10. Click the Download Summary menu link to download a portable copy of your medical information.  
 
If you have questions, please visit the Frequently Asked Questions section of the [employer]. Remember, [employer] is NOT to be used for urgent needs. For medical emergencies, dial 911. Now available from your iPhone and Android! Please provide this summary of care documentation to your next provider. Your primary care clinician is listed as [address] If you have any questions after today's visit, please call [phone].

## 2017-04-10 NOTE — LETTER
4/10/2017 8:58 AM 
 
Ms. Moreno [address] 
[address] PLEASE EXCUSE THE ABOVE PATIENT FROM WORK TODAY.  
 
 
Sincerely, 
 
 
Lary Mcmullen MD

## 2017-05-04 DIAGNOSIS — M25.561 CHRONIC PAIN OF BOTH KNEES: ICD-10-CM

## 2017-05-04 DIAGNOSIS — G89.29 CHRONIC PAIN OF BOTH KNEES: ICD-10-CM

## 2017-05-04 DIAGNOSIS — M25.562 CHRONIC PAIN OF BOTH KNEES: ICD-10-CM

## 2017-05-04 NOTE — TELEPHONE ENCOUNTER
Please review Pinky's message below and advise. Would you like to patient to continue Ultram and Mobic as previously prescribed by Dr. Curtis Mares? Last Visit: 04/10/2017 with MD Patricia Link    Next Appointment: noted to f/u as needed     meloxicam (MOBIC) 7.5 mg tablet 60 Tab 0 1/30/2017     Sig: TAKE ONE TABLET BY MOUTH TWICE DAILY WITH MEALS      traMADol (ULTRAM) 50 mg tablet 60 Tab 2 11/1/2016     Sig - Route: Take 1 Tab by mouth every six (6) hours as needed for Pain.  Max Daily Amount: 200 mg. - Oral

## 2017-05-04 NOTE — TELEPHONE ENCOUNTER
Pt called in requesting medication for pain in her leg. Pt states she was up al night tossing and turning because of pain. Please advise patient at [phone]. Pt would like Rx sent to [address] [facility] [address] [facility] in [facility].

## 2017-05-05 RX ORDER — TRAMADOL HYDROCHLORIDE 50 MG/1
50 TABLET ORAL
Qty: 60 TAB | Refills: 2 | OUTPATIENT
Start: 2017-05-05 | End: 2017-07-18 | Stop reason: SDUPTHER

## 2017-05-05 RX ORDER — MELOXICAM 7.5 MG/1
7.5 TABLET ORAL 2 TIMES DAILY
Qty: 60 TAB | Refills: 1 | Status: SHIPPED | OUTPATIENT
Start: 2017-05-05 | End: 2017-09-21 | Stop reason: SDUPTHER

## 2017-05-05 NOTE — TELEPHONE ENCOUNTER
Orders pending as previously prescribed. Requested Prescriptions     Pending Prescriptions Disp Refills    traMADol (ULTRAM) 50 mg tablet 60 Tab 2     Sig: Take 1 Tab by mouth every six (6) hours as needed for Pain. Max Daily Amount: 200 mg.    meloxicam (MOBIC) 7.5 mg tablet 60 Tab 1     Sig: Take 1 Tab by mouth two (2) times a day.

## 2017-05-19 ENCOUNTER — OFFICE VISIT (OUTPATIENT)
Dept: ORTHOPEDIC SURGERY | Facility: CLINIC | Age: 52
End: 2017-05-19

## 2017-05-19 VITALS
TEMPERATURE: 99.1 F | BODY MASS INDEX: 41.73 KG/M2 | WEIGHT: 198.8 LBS | DIASTOLIC BLOOD PRESSURE: 67 MMHG | SYSTOLIC BLOOD PRESSURE: 128 MMHG | HEIGHT: 58 IN | HEART RATE: 93 BPM

## 2017-05-19 DIAGNOSIS — G89.29 CHRONIC PAIN OF BOTH KNEES: ICD-10-CM

## 2017-05-19 DIAGNOSIS — M25.462 KNEE EFFUSION, LEFT: ICD-10-CM

## 2017-05-19 DIAGNOSIS — M17.11 PRIMARY OSTEOARTHRITIS OF RIGHT KNEE: Primary | ICD-10-CM

## 2017-05-19 DIAGNOSIS — M25.461 KNEE EFFUSION, RIGHT: ICD-10-CM

## 2017-05-19 DIAGNOSIS — M17.12 PRIMARY OSTEOARTHRITIS OF LEFT KNEE: ICD-10-CM

## 2017-05-19 DIAGNOSIS — M25.561 CHRONIC PAIN OF BOTH KNEES: ICD-10-CM

## 2017-05-19 DIAGNOSIS — M25.562 CHRONIC PAIN OF BOTH KNEES: ICD-10-CM

## 2017-05-19 NOTE — PATIENT INSTRUCTIONS
Knee Arthritis: Exercises  Your Care Instructions  Here are some examples of exercises for knee arthritis. Start each exercise slowly. Ease off the exercise if you start to have pain. Your doctor or physical therapist will tell you when you can start these exercises and which ones will work best for you. How to do the exercises  Knee flexion with heel slide    1. Lie on your back with your knees bent. 2. Slide your heel back by bending your affected knee as far as you can. Then hook your other foot around your ankle to help pull your heel even farther back. 3. Hold for about 6 seconds, then rest for up to 10 seconds. 4. Repeat 8 to 12 times. 5. Switch legs and repeat steps 1 through 4, even if only one knee is sore. Quad sets    1. Sit with your affected leg straight and supported on the floor or a firm bed. Place a small, rolled-up towel under your knee. Your other leg should be bent, with that foot flat on the floor. 2. Tighten the thigh muscles of your affected leg by pressing the back of your knee down into the towel. 3. Hold for about 6 seconds, then rest for up to 10 seconds. 4. Repeat 8 to 12 times. 5. Switch legs and repeat steps 1 through 4, even if only one knee is sore. Straight-leg raises to the front    1. Lie on your back with your good knee bent so that your foot rests flat on the floor. Your affected leg should be straight. Make sure that your low back has a normal curve. You should be able to slip your hand in between the floor and the small of your back, with your palm touching the floor and your back touching the back of your hand. 2. Tighten the thigh muscles in your affected leg by pressing the back of your knee flat down to the floor. Hold your knee straight. 3. Keeping the thigh muscles tight and your leg straight, lift your affected leg up so that your heel is about 12 inches off the floor. Hold for about 6 seconds, then lower slowly.   4. Relax for up to 10 seconds between repetitions. 5. Repeat 8 to 12 times. 6. Switch legs and repeat steps 1 through 5, even if only one knee is sore. Active knee flexion    1. Lie on your stomach with your knees straight. If your kneecap is uncomfortable, roll up a washcloth and put it under your leg just above your kneecap. 2. Lift the foot of your affected leg by bending the knee so that you bring the foot up toward your buttock. If this motion hurts, try it without bending your knee quite as far. This may help you avoid any painful motion. 3. Slowly move your leg up and down. 4. Repeat 8 to 12 times. 5. Switch legs and repeat steps 1 through 4, even if only one knee is sore. Quadriceps stretch (facedown)    1. Lie flat on your stomach, and rest your face on the floor. 2. Wrap a towel or belt strap around the lower part of your affected leg. Then use the towel or belt strap to slowly pull your heel toward your buttock until you feel a stretch. 3. Hold for about 15 to 30 seconds, then relax your leg against the towel or belt strap. 4. Repeat 2 to 4 times. 5. Switch legs and repeat steps 1 through 4, even if only one knee is sore. Stationary exercise bike    If you do not have a stationary exercise bike at home, you can find one to ride at your local health club or community center. 1. Adjust the height of the bike seat so that your knee is slightly bent when your leg is extended downward. If your knee hurts when the pedal reaches the top, you can raise the seat so that your knee does not bend as much. 2. Start slowly. At first, try to do 5 to 10 minutes of cycling with little to no resistance. Then increase your time and the resistance bit by bit until you can do 20 to 30 minutes without pain. 3. If you start to have pain, rest your knee until your pain gets back to the level that is normal for you. Or cycle for less time or with less effort. Follow-up care is a key part of your treatment and safety.  Be sure to make and go to all appointments, and call your doctor if you are having problems. It's also a good idea to know your test results and keep a list of the medicines you take. Where can you learn more? Go to http://macy-ana m.info/. Enter C159 in the search box to learn more about \"Knee Arthritis: Exercises. \"  Current as of: May 23, 2016  Content Version: 11.2  © 2006-2017 [employer]. Care instructions adapted under license by [employer] (which disclaims liability or warranty for this information). If you have questions about a medical condition or this instruction, always ask your healthcare professional. [address] any warranty or liability for your use of this information. Hyaluronic Acid (By injection)   Hyaluronic Acid (fya-dw-xvh-ON-ate AS-id)  Treats severe pain in your knee due to osteoarthritis. Brand Name(s): Euflexxa, Gel-One, GelSyn-3, GenVisc 850, Hyalgan, Hymovis, Monovisc, Orthovisc, Supartz FX   There may be other brand names for this medicine. When This Medicine Should Not Be Used: This medicine is not right for everyone. You should not receive it if you had an allergic reaction to hyaluronic acid or if you have a bleeding disorder. How to Use This Medicine:   Injectable  · Your doctor will tell you how many injections you will need. This medicine is injected into your knee joint. · A nurse or other health provider will give you this medicine. Drugs and Foods to Avoid:      Ask your doctor or pharmacist before using any other medicine, including over-the-counter medicines, vitamins, and herbal products. Warnings While Using This Medicine:   · Tell your doctor if you are pregnant or breastfeeding, or if you have any allergies, including to birds, feathers, or eggs. · Rest your knee for 48 hours after you receive an injection. Do not do strenuous, weightbearing activities, such as jogging or tennis.  Avoid activities that keep you standing for longer than 1 hour. Possible Side Effects While Using This Medicine:   Call your doctor right away if you notice any of these side effects:  · Allergic reaction: Itching or hives, swelling in your face or hands, swelling or tingling in your mouth or throat, chest tightness, trouble breathing  If you notice these less serious side effects, talk with your doctor:   · Mild increase in pain or swelling in your knee  · Pain, redness, or swelling where the medicine is injected  If you notice other side effects that you think are caused by this medicine, tell your doctor. Call your doctor for medical advice about side effects. You may report side effects to FDA at 6-534-FDA-7243  © 2017 [address] Information is for End User's use only and may not be sold, redistributed or otherwise used for commercial purposes. The above information is an  only. It is not intended as medical advice for individual conditions or treatments. Talk to your doctor, nurse or pharmacist before following any medical regimen to see if it is safe and effective for you.

## 2017-05-19 NOTE — PROGRESS NOTES
Patient: Heather Oppenheim                MRN: [id number]       SSN: [id number]  YOB: 1965        AGE: 54 y.o. SEX: female    PCP: Gricelda Butcher MD  05/19/17    Chief Complaint   Patient presents with    Knee Pain     Right     HISTORY:  Heather Oppenheim is a 54 y.o. female who is seen for bilateral knee (R>L) pain and swelling. She was previously seen by Dr. Lucas Freeman in February of 2017 who gave her a knee cortisone injection with temporary benefit and prescribed Mobic. She did not wish to be seen by him again since she felt like he was not listening to her. She notes increased bilateral knee pain for about a year. She notes pain with standing and walking. 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 She has high blood pressure. She is not diabetic. She reports that she has lost 15 pounds recently by cutting back on greasy foods and drinking more water. Current weight is 198 pounds. She is 4'10\" tall. She lives alone in [place]. She says that her 79-year-old daughter lives across the street. She has a 79-year-old son who lives in [place], and she has a 79-year-old son who is in the [employer] and getting ready to be stationed in [place], [place] in the near future after he is done OCS in [place] and a 1-year deployment in [place]. She plans to move closer to him in July of 2017. Her oldest son has three children and her daughter has five children. She walks regularly for exercise. She is allergic to latex.       Weight Metrics 5/19/2017 4/10/2017 2/15/2017 9/13/2016 9/12/2016 6/27/2016 6/8/2016   Weight 198 lb 12.8 oz 185 lb 190 lb - 190 lb 190 lb 190 lb   BMI 41.55 kg/m2 38.67 kg/m2 39.71 kg/m2 39.71 kg/m2 - 39.72 kg/m2 39.72 kg/m2     Patient Active Problem List   Diagnosis Code    Acute pharyngitis J02.9    Obesity (BMI 35.0-39.9 without comorbidity) ([facility]) E66.9    BMI 38.0-38.9,adult Z68.38    BMI 40.0-44.9, adult ([facility]) Z68.41     REVIEW OF SYSTEMS: All Below are Negative except: See HPI   Constitutional: negative for fever, chills, and weight loss. Cardiovascular: negative for chest pain, claudication, leg swelling, SOB, FLORES   Gastrointestinal: Negative for pain, N/V/C/D, Blood in stool or urine, dysuria,  hematuria, incontinence, pelvic pain. Musculoskeletal: See HPI   Neurological: Negative for dizziness and weakness. Negative for headaches, Visual changes, confusion, seizures   Phychiatric/Behavioral: Negative for depression, memory loss, substance  abuse. Extremities: Negative for hair changes, rash, or skin lesion changes. Hematologic: Negative for bleeding problems, bruising, pallor or swollen lymph  nodes   Peripheral Vascular: No calf pain, no circulation deficits. Social History     Social History    Marital status: SINGLE     Spouse name: N/A    Number of children: N/A    Years of education: N/A     Occupational History    Not on file. Social History Main Topics    Smoking status: Current Every Day Smoker     Packs/day: 0.25     Years: 15.00    Smokeless tobacco: Never Used    Alcohol use Yes      Comment: occasionally    Drug use: No    Sexual activity: Not on file     Other Topics Concern    Not on file     Social History Narrative      Allergies   Allergen Reactions    Latex Rash    Penicillins Other (comments)      Current Outpatient Prescriptions   Medication Sig    traMADol (ULTRAM) 50 mg tablet Take 1 Tab by mouth every six (6) hours as needed for Pain. Max Daily Amount: 200 mg.    meloxicam (MOBIC) 7.5 mg tablet Take 1 Tab by mouth two (2) times a day.     meloxicam (MOBIC) 7.5 mg tablet TAKE ONE TABLET BY MOUTH TWICE DAILY WITH MEALS    acetaminophen-codeine (TYLENOL-CODEINE #3) 300-30 mg per tablet Take 1 Tab by mouth every six (6) hours as needed for Pain. Max Daily Amount: 4 Tabs.  traMADol (ULTRAM) 50 mg tablet Take 50 mg by mouth every six (6) hours as needed for Pain.  colchicine 0.6 mg tablet Take 1 Tab by mouth daily. Take 3 times daily on day 1. Then twice daily on days two and three.  omeprazole (PRILOSEC) 20 mg capsule Take 20 mg by mouth daily.  ferrous sulfate (IRON) 325 mg (65 mg iron) EC tablet Take 1 Tab by mouth two (2) times daily (with meals). No current facility-administered medications for this visit. PHYSICAL EXAMINATION:  Visit Vitals    /67    Pulse 93    Temp 99.1 °F (37.3 °C) (Oral)    Ht 4' 10\" (1.473 m)    Wt 198 lb 12.8 oz (90.2 kg)    BMI 41.55 kg/m2      ORTHO EXAMINATION:  Examination Right knee Left knee   Skin Intact Intact   Range of motion 100-0 100-0   Effusion + -   Medial joint line tenderness + +   Lateral joint line tenderness - -   Popliteal tenderness - -   Osteophytes palpable + +   Rajnis - -   Patella crepitus + +   Anterior drawer - -   Lateral laxity - -   Medial laxity - -   Varus deformity +, mild -   Valgus deformity - +, mild   Pretibial edema - -   Calf tenderness - -     PROCEDURE:  After timeout and under sterile conditions, Ms. Tiera Hernández had her right knee aspirated 35 cc of light yellow blood-tinged fluid. The fluid was discarded. After discussing treatment options, patient's knees were injected with 2 cc of Synvisc of her own supply:   LOT NO: [id number]   EXP.  DATE: 2019-03    Chart reviewed for the following:   Hermila Persaud MD, have reviewed the History, Physical and updated the Allergic reactions for [facility]     TIME OUT performed immediately prior to start of procedure:  Hermila Persaud MD, have performed the following reviews on Jess Rose prior to the start of the procedure:            * Patient was identified by name and date of birth   * Agreement on procedure being performed was verified  * Risks and Benefits explained to the patient  * Procedure site verified and marked as necessary  * Patient was positioned for comfort  * Consent was obtained     Time: 11:53 AM     Date of procedure: 5/19/2017    Procedure performed by:  Shannon Rivera MD    Ms. Rose tolerated the procedure well with no complications. RADIOGRAPHS:  XR MICHAELLE KNEES 5/26/15  IMPRESSION:  No fractures, no effusion, severe medial joint space narrowing on the right, severe lateral joint space narrowing on the left, no osteophytes present. XR LEFT KNEE 3/3/15  IMPRESSION:  Moderate suprapatellar joint effusion. XR RIGHT KNEE 7/3/13  IMPRESSION:  Moderate changes of osteoarthritis. Large suprapatellar effusion. IMPRESSION:      ICD-10-CM ICD-9-CM    1. Primary osteoarthritis of right knee M17.11 715.16 DRAIN/INJECT LARGE JOINT/BURSA    Severe, medial compartment   2. Primary osteoarthritis of left knee M17.12 715.16 DRAIN/INJECT LARGE JOINT/BURSA    Severe, lateral compartment   3. Chronic pain of both knees M25.561 719.46 DRAIN/INJECT LARGE JOINT/BURSA    M25.562 338.29 DRAIN/INJECT LARGE JOINT/BURSA    G89.29     4. Knee effusion, right M25.461 719.06    5. Knee effusion, left M25.462 719.06    6. BMI 40.0-44.9, adult [facility] Z68.41 V85.41      PLAN:  After timeout and under sterile conditions, Ms. Nara Youngblood had her right knee aspirated 35 cc of light yellow blood-tinged fluid. The fluid was discarded. After discussing treatment options, patient's knees were injected with 2 cc of Synvisc of her own supply. I will see her back in 1 week for her second Synvisc sample injections. We discussed possible need for right knee arthroplasty potentially unicompartmental at some time in the future pending weight loss.       Scribed by [employer] ([address]) as dictated by [facility]. Yong Lopez MD

## 2017-05-26 ENCOUNTER — OFFICE VISIT (OUTPATIENT)
Dept: ORTHOPEDIC SURGERY | Facility: CLINIC | Age: 52
End: 2017-05-26

## 2017-05-26 VITALS
TEMPERATURE: 98.6 F | HEIGHT: 58 IN | DIASTOLIC BLOOD PRESSURE: 74 MMHG | HEART RATE: 78 BPM | SYSTOLIC BLOOD PRESSURE: 129 MMHG | BODY MASS INDEX: 41.56 KG/M2 | WEIGHT: 198 LBS

## 2017-05-26 DIAGNOSIS — M25.462 KNEE EFFUSION, LEFT: ICD-10-CM

## 2017-05-26 DIAGNOSIS — M17.11 PRIMARY OSTEOARTHRITIS OF RIGHT KNEE: Primary | ICD-10-CM

## 2017-05-26 DIAGNOSIS — M25.562 CHRONIC PAIN OF BOTH KNEES: ICD-10-CM

## 2017-05-26 DIAGNOSIS — M25.461 KNEE EFFUSION, RIGHT: ICD-10-CM

## 2017-05-26 DIAGNOSIS — G89.29 CHRONIC PAIN OF BOTH KNEES: ICD-10-CM

## 2017-05-26 DIAGNOSIS — M25.561 CHRONIC PAIN OF BOTH KNEES: ICD-10-CM

## 2017-05-26 DIAGNOSIS — M17.12 PRIMARY OSTEOARTHRITIS OF LEFT KNEE: ICD-10-CM

## 2017-05-26 NOTE — PATIENT INSTRUCTIONS
Knee Arthritis: Exercises  Your Care Instructions  Here are some examples of exercises for knee arthritis. Start each exercise slowly. Ease off the exercise if you start to have pain. Your doctor or physical therapist will tell you when you can start these exercises and which ones will work best for you. How to do the exercises  Knee flexion with heel slide    1. Lie on your back with your knees bent. 2. Slide your heel back by bending your affected knee as far as you can. Then hook your other foot around your ankle to help pull your heel even farther back. 3. Hold for about 6 seconds, then rest for up to 10 seconds. 4. Repeat 8 to 12 times. 5. Switch legs and repeat steps 1 through 4, even if only one knee is sore. Quad sets    1. Sit with your affected leg straight and supported on the floor or a firm bed. Place a small, rolled-up towel under your knee. Your other leg should be bent, with that foot flat on the floor. 2. Tighten the thigh muscles of your affected leg by pressing the back of your knee down into the towel. 3. Hold for about 6 seconds, then rest for up to 10 seconds. 4. Repeat 8 to 12 times. 5. Switch legs and repeat steps 1 through 4, even if only one knee is sore. Straight-leg raises to the front    1. Lie on your back with your good knee bent so that your foot rests flat on the floor. Your affected leg should be straight. Make sure that your low back has a normal curve. You should be able to slip your hand in between the floor and the small of your back, with your palm touching the floor and your back touching the back of your hand. 2. Tighten the thigh muscles in your affected leg by pressing the back of your knee flat down to the floor. Hold your knee straight. 3. Keeping the thigh muscles tight and your leg straight, lift your affected leg up so that your heel is about 12 inches off the floor. Hold for about 6 seconds, then lower slowly.   4. Relax for up to 10 seconds between repetitions. 5. Repeat 8 to 12 times. 6. Switch legs and repeat steps 1 through 5, even if only one knee is sore. Active knee flexion    1. Lie on your stomach with your knees straight. If your kneecap is uncomfortable, roll up a washcloth and put it under your leg just above your kneecap. 2. Lift the foot of your affected leg by bending the knee so that you bring the foot up toward your buttock. If this motion hurts, try it without bending your knee quite as far. This may help you avoid any painful motion. 3. Slowly move your leg up and down. 4. Repeat 8 to 12 times. 5. Switch legs and repeat steps 1 through 4, even if only one knee is sore. Quadriceps stretch (facedown)    1. Lie flat on your stomach, and rest your face on the floor. 2. Wrap a towel or belt strap around the lower part of your affected leg. Then use the towel or belt strap to slowly pull your heel toward your buttock until you feel a stretch. 3. Hold for about 15 to 30 seconds, then relax your leg against the towel or belt strap. 4. Repeat 2 to 4 times. 5. Switch legs and repeat steps 1 through 4, even if only one knee is sore. Stationary exercise bike    If you do not have a stationary exercise bike at home, you can find one to ride at your local health club or community center. 1. Adjust the height of the bike seat so that your knee is slightly bent when your leg is extended downward. If your knee hurts when the pedal reaches the top, you can raise the seat so that your knee does not bend as much. 2. Start slowly. At first, try to do 5 to 10 minutes of cycling with little to no resistance. Then increase your time and the resistance bit by bit until you can do 20 to 30 minutes without pain. 3. If you start to have pain, rest your knee until your pain gets back to the level that is normal for you. Or cycle for less time or with less effort. Follow-up care is a key part of your treatment and safety.  Be sure to make and go to all appointments, and call your doctor if you are having problems. It's also a good idea to know your test results and keep a list of the medicines you take. Where can you learn more? Go to http://macy-ana m.info/. Enter C159 in the search box to learn more about \"Knee Arthritis: Exercises. \"  Current as of: May 23, 2016  Content Version: 11.2  © 2006-2017 [employer]. Care instructions adapted under license by [employer] (which disclaims liability or warranty for this information). If you have questions about a medical condition or this instruction, always ask your healthcare professional. [address] any warranty or liability for your use of this information. Hyaluronic Acid (By injection)   Hyaluronic Acid (men-qo-vvm-ON-ate AS-id)  Treats severe pain in your knee due to osteoarthritis. Brand Name(s): Euflexxa, Gel-One, GelSyn-3, GenVisc 850, Hyalgan, Hymovis, Monovisc, Orthovisc, Supartz FX   There may be other brand names for this medicine. When This Medicine Should Not Be Used: This medicine is not right for everyone. You should not receive it if you had an allergic reaction to hyaluronic acid or if you have a bleeding disorder. How to Use This Medicine:   Injectable  · Your doctor will tell you how many injections you will need. This medicine is injected into your knee joint. · A nurse or other health provider will give you this medicine. Drugs and Foods to Avoid:      Ask your doctor or pharmacist before using any other medicine, including over-the-counter medicines, vitamins, and herbal products. Warnings While Using This Medicine:   · Tell your doctor if you are pregnant or breastfeeding, or if you have any allergies, including to birds, feathers, or eggs. · Rest your knee for 48 hours after you receive an injection. Do not do strenuous, weightbearing activities, such as jogging or tennis.  Avoid activities that keep you standing for longer than 1 hour. Possible Side Effects While Using This Medicine:   Call your doctor right away if you notice any of these side effects:  · Allergic reaction: Itching or hives, swelling in your face or hands, swelling or tingling in your mouth or throat, chest tightness, trouble breathing  If you notice these less serious side effects, talk with your doctor:   · Mild increase in pain or swelling in your knee  · Pain, redness, or swelling where the medicine is injected  If you notice other side effects that you think are caused by this medicine, tell your doctor. Call your doctor for medical advice about side effects. You may report side effects to FDA at 9-855-FDA-2564  © 2017 [facility] [place][note 2] Information is for End User's use only and may not be sold, redistributed or otherwise used for commercial purposes. The above information is an  only. It is not intended as medical advice for individual conditions or treatments. Talk to your doctor, nurse or pharmacist before following any medical regimen to see if it is safe and effective for you.

## 2017-05-26 NOTE — MR AVS SNAPSHOT
Visit Information Date & Time Provider Department Dept. Phone Encounter #  
 5/26/2017 11:30 AM Marcia Hurtado, [address] [facility] [place] [phone] Follow-up Instructions Return in about 1 week (around 6/2/2017). Your Appointments 6/2/2017 11:30 AM  
Follow Up with Marcia Hurtado MD  
[address] [facility], [address] [facility] - [place]) Appt Note: SYNVISC-MICHAELLE KNEES-1/3/DO NOT BILL FOR MEDS  
 [address] [facility], [address] [place] [id number] [phone]  
  
   
 [address] [place], [address] [facility] [address] Upcoming Health Maintenance Date Due Pneumococcal 19-64 Medium Risk (1 of 1 - PPSV23) 12/26/1986 DTaP/Tdap/Td series (1 - Tdap) 12/26/1988 PAP AKA CERVICAL CYTOLOGY 12/26/1988 INFLUENZA AGE 9 TO ADULT 8/1/2017 Allergies as of 5/26/2017  Review Complete On: 5/26/2017 By: Luke Candelario Severity Noted Reaction Type Reactions Latex  09/18/2012   Topical Rash Penicillins  10/25/2014    Other (comments) Current Immunizations  Never Reviewed No immunizations on file. Not reviewed this visit You Were Diagnosed With   
  
 Codes Comments Primary osteoarthritis of right knee    -  Primary ICD-10-CM: M17.11 ICD-9-CM: 715.16 Primary osteoarthritis of left knee     ICD-10-CM: M17.12 
ICD-9-CM: 715.16 Chronic pain of both knees     ICD-10-CM: M25.561, M25.562, G89.29 ICD-9-CM: 719.46, 338.29 Knee effusion, right     ICD-10-CM: M25.461 ICD-9-CM: 719.06 Knee effusion, left     ICD-10-CM: M25.462 ICD-9-CM: 719.06 Vitals BP Pulse Temp Height(growth percentile) Weight(growth percentile) BMI  
 129/74 78 98.6 °F (37 °C) (Oral) 4' 10\" (1.473 m) 198 lb (89.8 kg) 41.38 kg/m2 OB Status Smoking Status Having regular periods Current Every Day Smoker BMI and BSA Data Body Mass Index Body Surface Area 41.38 kg/m 2 1.92 m 2 Preferred Pharmacy Pharmacy Name Phone [facility] [address]. [phone] Your Updated Medication List  
  
   
This list is accurate as of: 5/26/17 11:36 AM.  Always use your most recent med list.  
  
  
  
  
 acetaminophen-codeine 300-30 mg per tablet Commonly known as:  TYLENOL-CODEINE #3 Take 1 Tab by mouth every six (6) hours as needed for Pain. Max Daily Amount: 4 Tabs. colchicine 0.6 mg tablet Take 1 Tab by mouth daily. Take 3 times daily on day 1. Then twice daily on days two and three. ferrous sulfate 325 mg (65 mg iron) EC tablet Commonly known as:  IRON Take 1 Tab by mouth two (2) times daily (with meals). * meloxicam 7.5 mg tablet Commonly known as:  MOBIC  
TAKE ONE TABLET BY MOUTH TWICE DAILY WITH MEALS  
  
 * meloxicam 7.5 mg tablet Commonly known as:  MOBIC Take 1 Tab by mouth two (2) times a day. PriLOSEC 20 mg capsule Generic drug:  omeprazole Take 20 mg by mouth daily. * traMADol 50 mg tablet Commonly known as:  ULTRAM  
Take 50 mg by mouth every six (6) hours as needed for Pain. * traMADol 50 mg tablet Commonly known as:  ULTRAM  
Take 1 Tab by mouth every six (6) hours as needed for Pain. Max Daily Amount: 200 mg.  
  
 * Notice: This list has 4 medication(s) that are the same as other medications prescribed for you. Read the directions carefully, and ask your doctor or other care provider to review them with you. Follow-up Instructions Return in about 1 week (around 6/2/2017). Patient Instructions Knee Arthritis: Exercises Your Care Instructions Here are some examples of exercises for knee arthritis. Start each exercise slowly. Ease off the exercise if you start to have pain. Your doctor or physical therapist will tell you when you can start these exercises and which ones will work best for you. How to do the exercises Knee flexion with heel slide 1. Lie on your back with your knees bent. 2. Slide your heel back by bending your affected knee as far as you can. Then hook your other foot around your ankle to help pull your heel even farther back. 3. Hold for about 6 seconds, then rest for up to 10 seconds. 4. Repeat 8 to 12 times. 5. Switch legs and repeat steps 1 through 4, even if only one knee is sore. [facility] Ulabox 1. Sit with your affected leg straight and supported on the floor or a firm bed. Place a small, rolled-up towel under your knee. Your other leg should be bent, with that foot flat on the floor. 2. Tighten the thigh muscles of your affected leg by pressing the back of your knee down into the towel. 3. Hold for about 6 seconds, then rest for up to 10 seconds. 4. Repeat 8 to 12 times. 5. Switch legs and repeat steps 1 through 4, even if only one knee is sore. Straight-leg raises to the front 1. Lie on your back with your good knee bent so that your foot rests flat on the floor. Your affected leg should be straight. Make sure that your low back has a normal curve. You should be able to slip your hand in between the floor and the small of your back, with your palm touching the floor and your back touching the back of your hand. 2. Tighten the thigh muscles in your affected leg by pressing the back of your knee flat down to the floor. Hold your knee straight. 3. Keeping the thigh muscles tight and your leg straight, lift your affected leg up so that your heel is about 12 inches off the floor. Hold for about 6 seconds, then lower slowly. 4. Relax for up to 10 seconds between repetitions. 5. Repeat 8 to 12 times. 6. Switch legs and repeat steps 1 through 5, even if only one knee is sore. Active knee flexion 1. Lie on your stomach with your knees straight. If your kneecap is uncomfortable, roll up a washcloth and put it under your leg just above your kneecap. 2. Lift the foot of your affected leg by bending the knee so that you bring the foot up toward your buttock. If this motion hurts, try it without bending your knee quite as far. This may help you avoid any painful motion. 3. Slowly move your leg up and down. 4. Repeat 8 to 12 times. 5. Switch legs and repeat steps 1 through 4, even if only one knee is sore. Quadriceps stretch (facedown) 1. Lie flat on your stomach, and rest your face on the floor. 2. Wrap a towel or belt strap around the lower part of your affected leg. Then use the towel or belt strap to slowly pull your heel toward your buttock until you feel a stretch. 3. Hold for about 15 to 30 seconds, then relax your leg against the towel or belt strap. 4. Repeat 2 to 4 times. 5. Switch legs and repeat steps 1 through 4, even if only one knee is sore. Stationary exercise bike If you do not have a stationary exercise bike at home, you can find one to ride at your local health club or community center. 1. Adjust the height of the bike seat so that your knee is slightly bent when your leg is extended downward. If your knee hurts when the pedal reaches the top, you can raise the seat so that your knee does not bend as much. 2. Start slowly. At first, try to do 5 to 10 minutes of cycling with little to no resistance. Then increase your time and the resistance bit by bit until you can do 20 to 30 minutes without pain. 3. If you start to have pain, rest your knee until your pain gets back to the level that is normal for you. Or cycle for less time or with less effort. Follow-up care is a key part of your treatment and safety. Be sure to make and go to all appointments, and call your doctor if you are having problems. It's also a good idea to know your test results and keep a list of the medicines you take. Where can you learn more? Go to http://macy-ana m.info/. Enter C159 in the search box to learn more about \"Knee Arthritis: Exercises. \" Current as of: May 23, 2016 Content Version: 11.2 © 1501-4188 [employer] Care instructions adapted under license by [employer] (which disclaims liability or warranty for this information). If you have questions about a medical condition or this instruction, always ask your healthcare professional. [address] any warranty or liability for your use of this information. Hyaluronic Acid (By injection) Hyaluronic Acid (rcw-fc-hdc-ON-ate AS-id) Treats severe pain in your knee due to osteoarthritis. Brand Name(s): Euflexxa, Gel-One, GelSyn-3, GenVisc 850, Hyalgan, Hymovis, Monovisc, Orthovisc, Supartz FX There may be other brand names for this medicine. When This Medicine Should Not Be Used: This medicine is not right for everyone. You should not receive it if you had an allergic reaction to hyaluronic acid or if you have a bleeding disorder. How to Use This Medicine:  
Injectable · Your doctor will tell you how many injections you will need. This medicine is injected into your knee joint. · A nurse or other health provider will give you this medicine. Drugs and Foods to Avoid: Ask your doctor or pharmacist before using any other medicine, including over-the-counter medicines, vitamins, and herbal products. Warnings While Using This Medicine: · Tell your doctor if you are pregnant or breastfeeding, or if you have any allergies, including to birds, feathers, or eggs. · Rest your knee for 48 hours after you receive an injection. Do not do strenuous, weightbearing activities, such as jogging or tennis. Avoid activities that keep you standing for longer than 1 hour. Possible Side Effects While Using This Medicine:  
Call your doctor right away if you notice any of these side effects: · Allergic reaction: Itching or hives, swelling in your face or hands, swelling or tingling in your mouth or throat, chest tightness, trouble breathing If you notice these less serious side effects, talk with your doctor: · Mild increase in pain or swelling in your knee · Pain, redness, or swelling where the medicine is injected If you notice other side effects that you think are caused by this medicine, tell your doctor. Call your doctor for medical advice about side effects. You may report side effects to FDA at 0-608-FEV-9850 © 2017 [address] Information is for End User's use only and may not be sold, redistributed or otherwise used for commercial purposes. The above information is an  only. It is not intended as medical advice for individual conditions or treatments. Talk to your doctor, nurse or pharmacist before following any medical regimen to see if it is safe and effective for you. Introducing [facility]! [address] [facility] introduces [employer] patient portal. Now you can access parts of your medical record, email your doctor's office, and request medication refills online. 1. In your internet browser, go to [email or web address] 2. Click on the First Time User? Click Here link in the Sign In box. You will see the New Member Sign Up page. 3. Enter your [employer] Access Code exactly as it appears below. You will not need to use this code after youve completed the sign-up process. If you do not sign up before the expiration date, you must request a new code. · [employer] Access Code: [email or web address] Expires: 7/9/2017  8:32 AM 
 
4. Enter the last four digits of your Social Security Number (xxxx) and Date of Birth (mm/dd/yyyy) as indicated and click Submit. You will be taken to the next sign-up page. 5. Create a [employer] ID. This will be your [employer] login ID and cannot be changed, so think of one that is secure and easy to remember. 6. Create a [employer] password. You can change your password at any time. 7. Enter your Password Reset Question and Answer. This can be used at a later time if you forget your password. 8. Enter your e-mail address. You will receive e-mail notification when new information is available in [address] 9. Click Sign Up. You can now view and download portions of your medical record. 10. Click the Download Summary menu link to download a portable copy of your medical information. If you have questions, please visit the Frequently Asked Questions section of the [employer] website. Remember, [employer] is NOT to be used for urgent needs. For medical emergencies, dial 911. Now available from your iPhone and Android! Please provide this summary of care documentation to your next provider. Your primary care clinician is listed as [address] If you have any questions after today's visit, please call [phone].

## 2017-05-26 NOTE — PROGRESS NOTES
Patient: Antonette Garcia                MRN: [id number]       SSN: [id number]  YOB: 1965        AGE: 54 y.o. SEX: female  Body mass index is 41.38 kg/(m^2). PCP: Georgiana Bustamante MD  05/26/17    Chief Complaint   Patient presents with    Knee Pain     bilat, Synvisc #2     HISTORY:  Antonette Garcia is a 54 y.o. female who is seen for bilateral knee pain. ICD-10-CM ICD-9-CM    1. Primary osteoarthritis of right knee M17.11 715.16 DRAIN/INJECT LARGE JOINT/BURSA   2. Primary osteoarthritis of left knee M17.12 715.16 DRAIN/INJECT LARGE JOINT/BURSA   3. Chronic pain of both knees M25.561 719.46 DRAIN/INJECT LARGE JOINT/BURSA    M25.562 338.29 DRAIN/INJECT LARGE JOINT/BURSA    G89.29     4. Knee effusion, right M25.461 719.06    5. Knee effusion, left M25.462 719.06      PROCEDURE:  After discussing treatment options, Ms. Rose's knees were injected with 2 cc of Synvisc of her own supply:   LOT NO.: [id number]   EXP. DATE: 2019-03-31     Chart reviewed for the following:   Isabela Chery MD, have reviewed the History, Physical and updated the Allergic reactions for Jess Willard performed immediately prior to start of procedure:  Isabela Chery MD, have performed the following reviews on Jess Rose prior to the start of the procedure:            * Patient was identified by name and date of birth   * Agreement on procedure being performed was verified  * Risks and Benefits explained to the patient  * Procedure site verified and marked as necessary  * Patient was positioned for comfort  * Consent was obtained     Time: 11:36 AM     Date of procedure: 5/26/2017    Procedure performed by:  Jessee Dakin, MD    Ms. Rose tolerated the procedure well with no complications. PLAN:  After discussing treatment options, patient's knees were injected with 2 cc of Synvisc of her own supply.   Ms. Yvette Whitman will follow up in one week to complete her Euflexxa injection series.       Scribed by [employer] ([facility]) as dictated by Anna Shaw MD

## 2017-06-02 ENCOUNTER — OFFICE VISIT (OUTPATIENT)
Dept: ORTHOPEDIC SURGERY | Facility: CLINIC | Age: 52
End: 2017-06-02

## 2017-06-02 VITALS
DIASTOLIC BLOOD PRESSURE: 81 MMHG | HEIGHT: 58 IN | SYSTOLIC BLOOD PRESSURE: 123 MMHG | BODY MASS INDEX: 41.35 KG/M2 | HEART RATE: 99 BPM | WEIGHT: 197 LBS

## 2017-06-02 DIAGNOSIS — M25.462 KNEE EFFUSION, LEFT: ICD-10-CM

## 2017-06-02 DIAGNOSIS — G89.29 CHRONIC PAIN OF BOTH KNEES: ICD-10-CM

## 2017-06-02 DIAGNOSIS — M25.562 CHRONIC PAIN OF BOTH KNEES: ICD-10-CM

## 2017-06-02 DIAGNOSIS — M25.461 KNEE EFFUSION, RIGHT: ICD-10-CM

## 2017-06-02 DIAGNOSIS — M25.561 CHRONIC PAIN OF BOTH KNEES: ICD-10-CM

## 2017-06-02 DIAGNOSIS — M17.12 PRIMARY OSTEOARTHRITIS OF LEFT KNEE: ICD-10-CM

## 2017-06-02 DIAGNOSIS — M17.11 PRIMARY OSTEOARTHRITIS OF RIGHT KNEE: Primary | ICD-10-CM

## 2017-06-02 NOTE — PATIENT INSTRUCTIONS
Knee Arthritis: Exercises  Your Care Instructions  Here are some examples of exercises for knee arthritis. Start each exercise slowly. Ease off the exercise if you start to have pain. Your doctor or physical therapist will tell you when you can start these exercises and which ones will work best for you. How to do the exercises  Knee flexion with heel slide    1. Lie on your back with your knees bent. 2. Slide your heel back by bending your affected knee as far as you can. Then hook your other foot around your ankle to help pull your heel even farther back. 3. Hold for about 6 seconds, then rest for up to 10 seconds. 4. Repeat 8 to 12 times. 5. Switch legs and repeat steps 1 through 4, even if only one knee is sore. Quad sets    1. Sit with your affected leg straight and supported on the floor or a firm bed. Place a small, rolled-up towel under your knee. Your other leg should be bent, with that foot flat on the floor. 2. Tighten the thigh muscles of your affected leg by pressing the back of your knee down into the towel. 3. Hold for about 6 seconds, then rest for up to 10 seconds. 4. Repeat 8 to 12 times. 5. Switch legs and repeat steps 1 through 4, even if only one knee is sore. Straight-leg raises to the front    1. Lie on your back with your good knee bent so that your foot rests flat on the floor. Your affected leg should be straight. Make sure that your low back has a normal curve. You should be able to slip your hand in between the floor and the small of your back, with your palm touching the floor and your back touching the back of your hand. 2. Tighten the thigh muscles in your affected leg by pressing the back of your knee flat down to the floor. Hold your knee straight. 3. Keeping the thigh muscles tight and your leg straight, lift your affected leg up so that your heel is about 12 inches off the floor. Hold for about 6 seconds, then lower slowly.   4. Relax for up to 10 seconds between repetitions. 5. Repeat 8 to 12 times. 6. Switch legs and repeat steps 1 through 5, even if only one knee is sore. Active knee flexion    1. Lie on your stomach with your knees straight. If your kneecap is uncomfortable, roll up a washcloth and put it under your leg just above your kneecap. 2. Lift the foot of your affected leg by bending the knee so that you bring the foot up toward your buttock. If this motion hurts, try it without bending your knee quite as far. This may help you avoid any painful motion. 3. Slowly move your leg up and down. 4. Repeat 8 to 12 times. 5. Switch legs and repeat steps 1 through 4, even if only one knee is sore. Quadriceps stretch (facedown)    1. Lie flat on your stomach, and rest your face on the floor. 2. Wrap a towel or belt strap around the lower part of your affected leg. Then use the towel or belt strap to slowly pull your heel toward your buttock until you feel a stretch. 3. Hold for about 15 to 30 seconds, then relax your leg against the towel or belt strap. 4. Repeat 2 to 4 times. 5. Switch legs and repeat steps 1 through 4, even if only one knee is sore. Stationary exercise bike    If you do not have a stationary exercise bike at home, you can find one to ride at your local health club or community center. 1. Adjust the height of the bike seat so that your knee is slightly bent when your leg is extended downward. If your knee hurts when the pedal reaches the top, you can raise the seat so that your knee does not bend as much. 2. Start slowly. At first, try to do 5 to 10 minutes of cycling with little to no resistance. Then increase your time and the resistance bit by bit until you can do 20 to 30 minutes without pain. 3. If you start to have pain, rest your knee until your pain gets back to the level that is normal for you. Or cycle for less time or with less effort. Follow-up care is a key part of your treatment and safety.  Be sure to make and go to all appointments, and call your doctor if you are having problems. It's also a good idea to know your test results and keep a list of the medicines you take. Where can you learn more? Go to http://macy-ana m.info/. Enter C159 in the search box to learn more about \"Knee Arthritis: Exercises. \"  Current as of: May 23, 2016  Content Version: 11.2  © 2006-2017 [employer]. Care instructions adapted under license by [employer] (which disclaims liability or warranty for this information). If you have questions about a medical condition or this instruction, always ask your healthcare professional. [address] any warranty or liability for your use of this information. Hyaluronic Acid (By injection)   Hyaluronic Acid (hee-og-gix-ON-ate AS-id)  Treats severe pain in your knee due to osteoarthritis. Brand Name(s): Euflexxa, Gel-One, GelSyn-3, GenVisc 850, Hyalgan, Hymovis, Monovisc, Orthovisc, Supartz FX   There may be other brand names for this medicine. When This Medicine Should Not Be Used: This medicine is not right for everyone. You should not receive it if you had an allergic reaction to hyaluronic acid or if you have a bleeding disorder. How to Use This Medicine:   Injectable  · Your doctor will tell you how many injections you will need. This medicine is injected into your knee joint. · A nurse or other health provider will give you this medicine. Drugs and Foods to Avoid:      Ask your doctor or pharmacist before using any other medicine, including over-the-counter medicines, vitamins, and herbal products. Warnings While Using This Medicine:   · Tell your doctor if you are pregnant or breastfeeding, or if you have any allergies, including to birds, feathers, or eggs. · Rest your knee for 48 hours after you receive an injection. Do not do strenuous, weightbearing activities, such as jogging or tennis.  Avoid activities that keep you standing for longer than 1 hour. Possible Side Effects While Using This Medicine:   Call your doctor right away if you notice any of these side effects:  · Allergic reaction: Itching or hives, swelling in your face or hands, swelling or tingling in your mouth or throat, chest tightness, trouble breathing  If you notice these less serious side effects, talk with your doctor:   · Mild increase in pain or swelling in your knee  · Pain, redness, or swelling where the medicine is injected  If you notice other side effects that you think are caused by this medicine, tell your doctor. Call your doctor for medical advice about side effects. You may report side effects to FDA at 9-136-FDA-5244  © 2017 [address] Information is for End User's use only and may not be sold, redistributed or otherwise used for commercial purposes. The above information is an  only. It is not intended as medical advice for individual conditions or treatments. Talk to your doctor, nurse or pharmacist before following any medical regimen to see if it is safe and effective for you.

## 2017-06-02 NOTE — PROGRESS NOTES
Patient: Negrita Kamara                MRN: [id number]       SSN: [id number]  YOB: 1965        AGE: 54 y.o. SEX: female  Body mass index is 41.17 kg/(m^2). PCP: Eloisa Mathis MD  06/02/17    Chief Complaint   Patient presents with    Knee Pain     bilat     HISTORY:  Negrita Kamara is a 54 y.o. female who is seen for bilateral knee pain. PROCEDURE:  After discussing treatment options, Ms. Márquez knees were injected with 2 cc of Synvisc of her own supply:   LOT NO.: [id number]   EXP. DATE: 2019-03    TIME OUT performed immediately prior to start of procedure:  Sonya Rios MD, have performed the following reviews on Jess Rose prior to the start of the procedure:            * Patient was identified by name and date of birth   * Agreement on procedure being performed was verified  * Risks and Benefits explained to the patient  * Procedure site verified and marked as necessary  * Patient was positioned for comfort  * Consent was obtained     Time: 11:39 AM     Date of procedure: 6/2/2017    Procedure performed by:  Sourav Argueta MD    Ms. Rose tolerated the procedure well with no complications      [id number] ICD-9-CM    1. Primary osteoarthritis of right knee M17.11 715.16 DRAIN/INJECT LARGE JOINT/BURSA   2. Primary osteoarthritis of left knee M17.12 715.16 DRAIN/INJECT LARGE JOINT/BURSA   3. Chronic pain of both knees M25.561 719.46 DRAIN/INJECT LARGE JOINT/BURSA    M25.562 338.29 DRAIN/INJECT LARGE JOINT/BURSA    G89.29     4. Knee effusion, right M25.461 719.06    5. Knee effusion, left M25.462 719.06      PLAN:  After discussing treatment options, patient's knees were injected with 2 cc of Synvisc of her own supply. Ms. Patrick Banuelos will follow up PRN now that she has completed her Synvisc injection series.       Scribed by [employer] ([address]) as dictated by Sourav Argueta MD

## 2017-06-02 NOTE — PROGRESS NOTES
Patient: Ana Huerta                MRN: [id number]       SSN: [id number]  YOB: 1965        AGE: 54 y.o. SEX: female    PCP: Willis Churchill MD  06/02/17    Chief Complaint   Patient presents with    Knee Pain     bilat     HISTORY:  Ana Huerta is a 54 y.o. female who is seen for bilateral knee (R>L) pain and swelling. She was previously seen by Dr. Clotilde Garza in February of 2017 who gave her a knee cortisone injection with temporary benefit and prescribed Mobic. She did not wish to be seen by him again since she felt like he was not listening to her. She notes increased bilateral knee pain for about a year. She notes pain with standing and walking. 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 She has high blood pressure. She is not diabetic. She reports that she has lost 15 pounds recently by cutting back on greasy foods and drinking more water. Current weight is 198 pounds. She is 4'10\" tall. She lives alone in [facility]. She says that her 24-year-old daughter lives across the street. She has a 24-year-old son who lives in [place], and she has a 22-year-old son who is in the [employer] and getting ready to be stationed in [facility] in the near future after he is done OCS in [place] and a 1-year deployment in [place]. She plans to move closer to him in July of 2017. Her oldest son has three children and her daughter has five children. She walks regularly for exercise. She is allergic to latex.       Weight Metrics 6/2/2017 5/26/2017 5/19/2017 4/10/2017 2/15/2017 9/13/2016 9/12/2016   Weight 197 lb 198 lb 198 lb 12.8 oz 185 lb 190 lb - 190 lb   BMI 41.17 kg/m2 41.38 kg/m2 41.55 kg/m2 38.67 kg/m2 39.71 kg/m2 39.71 kg/m2 -     Patient Active Problem List   Diagnosis Code    Acute pharyngitis J02.9    Obesity (BMI 35.0-39.9 without comorbidity) ([facility]) E66.9    BMI 38.0-38.9,adult Z68.38    BMI 40.0-44.9, adult ([facility]) Z68.41     REVIEW OF SYSTEMS: All Below are Negative except: See HPI   Constitutional: negative for fever, chills, and weight loss. Cardiovascular: negative for chest pain, claudication, leg swelling, SOB, FLORES   Gastrointestinal: Negative for pain, N/V/C/D, Blood in stool or urine, dysuria,  hematuria, incontinence, pelvic pain. Musculoskeletal: See HPI   Neurological: Negative for dizziness and weakness. Negative for headaches, Visual changes, confusion, seizures   Phychiatric/Behavioral: Negative for depression, memory loss, substance  abuse. Extremities: Negative for hair changes, rash, or skin lesion changes. Hematologic: Negative for bleeding problems, bruising, pallor or swollen lymph  nodes   Peripheral Vascular: No calf pain, no circulation deficits. Social History     Social History    Marital status: SINGLE     Spouse name: N/A    Number of children: N/A    Years of education: N/A     Occupational History    Not on file. Social History Main Topics    Smoking status: Current Every Day Smoker     Packs/day: 0.25     Years: 15.00    Smokeless tobacco: Never Used    Alcohol use Yes      Comment: occasionally    Drug use: No    Sexual activity: Not on file     Other Topics Concern    Not on file     Social History Narrative      Allergies   Allergen Reactions    Latex Rash    Penicillins Other (comments)      Current Outpatient Prescriptions   Medication Sig    traMADol (ULTRAM) 50 mg tablet Take 1 Tab by mouth every six (6) hours as needed for Pain.  Max Daily Amount: 200 mg.    meloxicam (MOBIC) 7.5 mg tablet Take 1 Tab by mouth two (2) times a day.  meloxicam (MOBIC) 7.5 mg tablet TAKE ONE TABLET BY MOUTH TWICE DAILY WITH MEALS    acetaminophen-codeine (TYLENOL-CODEINE #3) 300-30 mg per tablet Take 1 Tab by mouth every six (6) hours as needed for Pain. Max Daily Amount: 4 Tabs.  traMADol (ULTRAM) 50 mg tablet Take 50 mg by mouth every six (6) hours as needed for Pain.  colchicine 0.6 mg tablet Take 1 Tab by mouth daily. Take 3 times daily on day 1. Then twice daily on days two and three.  omeprazole (PRILOSEC) 20 mg capsule Take 20 mg by mouth daily.  ferrous sulfate (IRON) 325 mg (65 mg iron) EC tablet Take 1 Tab by mouth two (2) times daily (with meals). No current facility-administered medications for this visit. PHYSICAL EXAMINATION:  Visit Vitals    /81 (BP 1 Location: Left arm, BP Patient Position: Sitting)    Pulse 99    Ht 4' 10\" (1.473 m)    Wt 197 lb (89.4 kg)    BMI 41.17 kg/m2      ORTHO EXAMINATION:  Examination Right shoulder Left shoulder   Skin Intact Intact   Effusion - -   Biceps deformity - -   Atrophy - -   AC joint tenderness - -   Acromial tenderness + +   Biceps tenderness - -   Forward flexion/Elevation  170   Active abduction  160   External rotation ROM 30 30   Internal rotation ROM 70 70   Apprehension - -   Impingement - -   Drop Arm Test - -   Neurovascular Intact Intact     Examination Right knee Left knee   Skin Intact Intact   Range of motion 100-0 100-0   Effusion + -   Medial joint line tenderness + +   Lateral joint line tenderness - -   Popliteal tenderness - -   Osteophytes palpable + +   Rajnis - -   Patella crepitus + +   Anterior drawer - -   Lateral laxity - -   Medial laxity - -   Varus deformity +, mild -   Valgus deformity - +, mild   Pretibial edema - -   Calf tenderness - -     PROCEDURE:  After timeout and under sterile conditions, Ms. Patrick Banuelos had her right knee aspirated 35 cc of light yellow blood-tinged fluid.  The fluid was discarded. After discussing treatment options, patient's knees were injected with 2 cc of Synvisc of her own supply:   LOT NO: [id number]   EXP. DATE: 2019-03    Chart reviewed for the following:   Álvaro Saenz MD, have reviewed the History, Physical and updated the Allergic reactions for Jess Willard performed immediately prior to start of procedure:  Álvaro Saenz MD, have performed the following reviews on Jess Rose prior to the start of the procedure:            * Patient was identified by name and date of birth   * Agreement on procedure being performed was verified  * Risks and Benefits explained to the patient  * Procedure site verified and marked as necessary  * Patient was positioned for comfort  * Consent was obtained     Time: 11:53 AM     Date of procedure: 6/2/2017    Procedure performed by:  Chuck Calhoun MD    Ms. Rose tolerated the procedure well with no complications. RADIOGRAPHS:  XR MICHAELLE KNEES 5/26/15  IMPRESSION:  No fractures, no effusion, severe medial joint space narrowing on the right, severe lateral joint space narrowing on the left, no osteophytes present. XR LEFT KNEE 3/3/15  IMPRESSION:  Moderate suprapatellar joint effusion. XR RIGHT KNEE 7/3/13  IMPRESSION:  Moderate changes of osteoarthritis. Large suprapatellar effusion. IMPRESSION:      ICD-10-CM ICD-9-CM    1. Primary osteoarthritis of right knee M17.11 715.16 DRAIN/INJECT LARGE JOINT/BURSA   2. Primary osteoarthritis of left knee M17.12 715.16 DRAIN/INJECT LARGE JOINT/BURSA   3. Chronic pain of both knees M25.561 719.46 DRAIN/INJECT LARGE JOINT/BURSA    M25.562 338.29 DRAIN/INJECT LARGE JOINT/BURSA    G89.29     4. Knee effusion, right M25.461 719.06    5. Knee effusion, left M25.462 719.06      PLAN:  After timeout and under sterile conditions, Ms. Mosie Dakins had her right knee aspirated 35 cc of light yellow blood-tinged fluid. The fluid was discarded.   After discussing treatment options, patient's knees were injected with 2 cc of Synvisc of her own supply. I will see her back in 1 week for her second Synvisc sample injections. We discussed possible need for right knee arthroplasty potentially unicompartmental at some time in the future pending weight loss.       Scribed by [employer] ([address]) as dictated by Desi Real MD

## 2017-07-18 RX ORDER — TRAMADOL HYDROCHLORIDE 50 MG/1
50 TABLET ORAL
Qty: 14 TAB | Refills: 2 | Status: SHIPPED | OUTPATIENT
Start: 2017-07-18 | End: 2019-03-15 | Stop reason: SDUPTHER

## 2017-07-18 NOTE — TELEPHONE ENCOUNTER
PHONE IN RX    Last Visit: 06/02/2017 with MD Cristofer Pedraza    Next Appointment: noted to f/u prn since completing Synvisc injection series   Previous Refill Encounters: 05/05/2017 per [facility] Fall [facility]60 with 2 refills     Requested Prescriptions     Pending Prescriptions Disp Refills    traMADol (ULTRAM) 50 mg tablet 60 Tab 2     Sig: Take 1 Tab by mouth every six (6) hours as needed for Pain. Max Daily Amount: 200 mg.

## 2017-10-06 ENCOUNTER — HOSPITAL ENCOUNTER (EMERGENCY)
Age: 52
Discharge: HOME | End: 2017-10-06
Attending: EMERGENCY MEDICINE
Payer: SELF-PAY

## 2017-10-06 VITALS
OXYGEN SATURATION: 100 % | TEMPERATURE: 98.6 F | DIASTOLIC BLOOD PRESSURE: 66 MMHG | WEIGHT: 195 LBS | HEIGHT: 58 IN | RESPIRATION RATE: 14 BRPM | BODY MASS INDEX: 40.93 KG/M2 | SYSTOLIC BLOOD PRESSURE: 149 MMHG | HEART RATE: 89 BPM

## 2017-10-06 DIAGNOSIS — L03.90 CELLULITIS, UNSPECIFIED CELLULITIS SITE: ICD-10-CM

## 2017-10-06 DIAGNOSIS — L30.9 DERMATITIS: Primary | ICD-10-CM

## 2017-10-06 PROCEDURE — 99283 EMERGENCY DEPT VISIT LOW MDM: CPT

## 2017-10-06 PROCEDURE — 74011000250 HC RX REV CODE- 250: Performed by: EMERGENCY MEDICINE

## 2017-10-06 PROCEDURE — 74011250637 HC RX REV CODE- 250/637: Performed by: EMERGENCY MEDICINE

## 2017-10-06 RX ORDER — BACITRACIN ZINC 500 UNIT/G
OINTMENT (GRAM) TOPICAL
Status: COMPLETED | OUTPATIENT
Start: 2017-10-06 | End: 2017-10-06

## 2017-10-06 RX ORDER — ONDANSETRON 4 MG/1
4 TABLET, ORALLY DISINTEGRATING ORAL
Qty: 14 TAB | Refills: 0 | Status: SHIPPED | OUTPATIENT
Start: 2017-10-06 | End: 2019-06-02

## 2017-10-06 RX ORDER — SULFAMETHOXAZOLE AND TRIMETHOPRIM 800; 160 MG/1; MG/1
1 TABLET ORAL 2 TIMES DAILY
Qty: 14 TAB | Refills: 0 | Status: SHIPPED | OUTPATIENT
Start: 2017-10-06 | End: 2017-10-13

## 2017-10-06 RX ORDER — ACETAMINOPHEN AND CODEINE PHOSPHATE 300; 30 MG/1; MG/1
1 TABLET ORAL
Qty: 15 TAB | Refills: 0 | Status: SHIPPED | OUTPATIENT
Start: 2017-10-06 | End: 2018-12-26

## 2017-10-06 RX ORDER — SULFAMETHOXAZOLE AND TRIMETHOPRIM 800; 160 MG/1; MG/1
1 TABLET ORAL
Status: COMPLETED | OUTPATIENT
Start: 2017-10-06 | End: 2017-10-06

## 2017-10-06 RX ADMIN — BACITRACIN ZINC: 500 OINTMENT TOPICAL at 06:10

## 2017-10-06 RX ADMIN — SULFAMETHOXAZOLE AND TRIMETHOPRIM 1 TABLET: 800; 160 TABLET ORAL at 06:10

## 2017-10-06 NOTE — DISCHARGE INSTRUCTIONS
Return for pain, fever, swelling, increased redness/warmth, shortness of breath, vomiting, decreased fluid intake, weakness, numbness, dizziness, or any change or concerns. Use cortisone and bacitracin over the counter as directed. Use warm compresses frequently as discussed. Dermatitis: Care Instructions  Your Care Instructions  Dermatitis is the general name used for any rash or inflammation of the skin. Different kinds of dermatitis cause different kinds of rashes. Common causes of a rash include new medicines, plants (such as poison oak or poison ivy), heat, and stress. Certain illnesses can also cause a rash. An allergic reaction to something that touches your skin, such as latex, nickel, or poison ivy, is called contact dermatitis. Contact dermatitis may also be caused by something that irritates the skin, such as bleach, a chemical, or soap. These types of rashes cannot be spread from person to person. How long your rash will last depends on what caused it. Rashes may last a few days or months. Follow-up care is a key part of your treatment and safety. Be sure to make and go to all appointments, and call your doctor if you are having problems. It's also a good idea to know your test results and keep a list of the medicines you take. How can you care for yourself at home? · Do not scratch the rash. Cut your nails short, and file them smooth. Or wear gloves if this helps keep you from scratching. · Wash the area with water only. Pat dry. · Put cold, wet cloths on the rash to reduce itching. · Keep cool, and stay out of the sun. · Leave the rash open to the air as much as possible. · If the rash itches, use hydrocortisone cream. Follow the directions on the label. Calamine lotion may help for plant rashes. · Take an over-the-counter antihistamine, such as diphenhydramine (Benadryl) or loratadine (Claritin), to help calm the itching. Read and follow all instructions on the label.   · If your doctor prescribed a cream, use it as directed. If your doctor prescribed medicine, take it exactly as directed. When should you call for help? Call your doctor now or seek immediate medical care if:  · You have symptoms of infection, such as:  ¨ Increased pain, swelling, warmth, or redness. ¨ Red streaks leading from the area. ¨ Pus draining from the area. ¨ A fever. · You have joint pain along with the rash. Watch closely for changes in your health, and be sure to contact your doctor if:  · Your rash is changing or getting worse. · You are not getting better as expected. Where can you learn more? Go to http://macy-ana m.info/. Enter (06) 3908 0487 in the search box to learn more about \"Dermatitis: Care Instructions. \"  Current as of: October 13, 2016  Content Version: 11.3  © 3726-8028 [employer]. Care instructions adapted under license by [employer] (which disclaims liability or warranty for this information). If you have questions about a medical condition or this instruction, always ask your healthcare professional. [place] [address] any warranty or liability for your use of this information. Cellulitis: Care Instructions  Your Care Instructions    Cellulitis is a skin infection. It often occurs after a break in the skin from a scrape, cut, bite, or puncture, or after a rash. The doctor has checked you carefully, but problems can develop later. If you notice any problems or new symptoms, get medical treatment right away. Follow-up care is a key part of your treatment and safety. Be sure to make and go to all appointments, and call your doctor if you are having problems. It's also a good idea to know your test results and keep a list of the medicines you take. How can you care for yourself at home? · Take your antibiotics as directed. Do not stop taking them just because you feel better.  You need to take the full course of antibiotics. · Prop up the infected area on pillows to reduce pain and swelling. Try to keep the area above the level of your heart as often as you can. · If your doctor told you how to care for your wound, follow your doctor's instructions. If you did not get instructions, follow this general advice:  ¨ Wash the wound with clean water 2 times a day. Don't use hydrogen peroxide or alcohol, which can slow healing. ¨ You may cover the wound with a thin layer of petroleum jelly, such as Vaseline, and a nonstick bandage. ¨ Apply more petroleum jelly and replace the bandage as needed. · Be safe with medicines. Take pain medicines exactly as directed. ¨ If the doctor gave you a prescription medicine for pain, take it as prescribed. ¨ If you are not taking a prescription pain medicine, ask your doctor if you can take an over-the-counter medicine. To prevent cellulitis in the future  · Try to prevent cuts, scrapes, or other injuries to your skin. Cellulitis most often occurs where there is a break in the skin. · If you get a scrape, cut, mild burn, or bite, wash the wound with clean water as soon as you can to help avoid infection. Don't use hydrogen peroxide or alcohol, which can slow healing. · If you have swelling in your legs (edema), support stockings and good skin care may help prevent leg sores and cellulitis. · Take care of your feet, especially if you have diabetes or other conditions that increase the risk of infection. Wear shoes and socks. Do not go barefoot. If you have athlete's foot or other skin problems on your feet, talk to your doctor about how to treat them. When should you call for help? Call your doctor now or seek immediate medical care if:  · You have signs that your infection is getting worse, such as:  ¨ Increased pain, swelling, warmth, or redness. ¨ Red streaks leading from the area. ¨ Pus draining from the area. ¨ A fever. · You get a rash.   Watch closely for changes in your health, and be sure to contact your doctor if:  · You are not getting better after 1 day (24 hours). · You do not get better as expected. Where can you learn more? Go to http://macy-ana m.info/. Lakeisha Reyez in the search box to learn more about \"Cellulitis: Care Instructions. \"  Current as of: October 13, 2016  Content Version: 11.3  © 5878-8827 [employer]. Care instructions adapted under license by [employer] (which disclaims liability or warranty for this information). If you have questions about a medical condition or this instruction, always ask your healthcare professional. [address] any warranty or liability for your use of this information.

## 2017-10-06 NOTE — ED TRIAGE NOTES
Reddened area in her right groin area for several days that is irritated and painful. Denies any discharge from area.

## 2017-10-06 NOTE — ED PROVIDER NOTES
HPI Comments: Pt c/o rt mid groin rash, x 3 weeks, started w area of skin breakdown and then itching/pain. Says gets sim areas, due to shaving, but this won't go away, hurting more. No swelling/drainage. No fever. No other rash, no abd pain, no nausea. No vaginal pain/dc/bleeding . Patient is a 54 y.o. female presenting with skin problem. Skin Problem           Past Medical History:   Diagnosis Date    BMI 38.0-38.9,adult 5/19/2017    BMI 40.0-44.9, adult ([facility] [address] 5/19/2017    Obesity (BMI 35.0-39.9 without comorbidity) 4/10/2017    Osteoarthritis     Ulcer ([facility] [address]     gi ulcer       Past Surgical History:   Procedure Laterality Date    ABDOMEN SURGERY PROC UNLISTED      hernia    HX GYN      D&C         Family History:   Problem Relation Age of Onset    Arthritis-osteo Other        Social History     Social History    Marital status: SINGLE     Spouse name: N/A    Number of children: N/A    Years of education: N/A     Occupational History    Not on file. Social History Main Topics    Smoking status: Current Every Day Smoker     Packs/day: 0.25     Years: 15.00    Smokeless tobacco: Never Used    Alcohol use Yes      Comment: occasionally    Drug use: No    Sexual activity: Not on file     Other Topics Concern    Not on file     Social History Narrative         ALLERGIES: Latex and Penicillins    Review of Systems   Constitutional: Negative for fever. HENT: Negative for congestion. Respiratory: Negative for cough and shortness of breath. Cardiovascular: Negative for chest pain. Gastrointestinal: Negative for abdominal pain and vomiting. Musculoskeletal: Negative for back pain. Skin: Positive for rash. Neurological: Negative for light-headedness. All other systems reviewed and are negative.       Vitals:    10/06/17 0553   BP: 149/66   Pulse: 89   Resp: 14   Temp: 98.6 °F (37 °C)   SpO2: 100%   Weight: 88.5 kg (195 lb)   Height: 4' 10\" (1.473 m)            Physical Exam Constitutional: She is oriented to person, place, and time. She appears well-developed. HENT:   Head: Normocephalic. Mouth/Throat: Oropharynx is clear and moist.   Eyes: Pupils are equal, round, and reactive to light. Neck: Normal range of motion. Cardiovascular: Normal rate and normal heart sounds. No murmur heard. Pulmonary/Chest: Effort normal. She has no wheezes. She has no rales. Abdominal: Soft. There is no tenderness. Musculoskeletal: Normal range of motion. She exhibits no edema.   + rt mid inguininal skin excoritaion, irreg, approx 3x4 cm, no swelling/induration, mild warmth, + ttp. No fluctuance. No lymphadenopathy   Neurological: She is alert and oriented to person, place, and time. Skin: Skin is warm and dry. Nursing note and vitals reviewed. [facility]  ED Course       Procedures    Vitals:  Patient Vitals for the past 12 hrs:   Temp Pulse Resp BP SpO2   10/06/17 0553 98.6 °F (37 °C) 89 14 149/66 100 %         Medications ordered:   Medications   bacitracin zinc (BACITRACIN) 500 unit/gram ointment (not administered)   trimethoprim-sulfamethoxazole (BACTRIM DS, SEPTRA DS) 160-800 mg per tablet 1 Tab (not administered)         Lab findings:  No results found for this or any previous visit (from the past 12 hour(s)). X-Ray, CT or other radiology findings or impressions:  No orders to display       Progress notes, Consult notes or additional Procedure notes:   No abscess, + excoriated irritated, itchy and painful. prob early cellulitis. abx given. Af, nl vitals. Not c/w sepsis/bacteremia. Stable for dc and close f/u. Det ret inst given. Pt agrees w dc plan, verbalizes understanding of det ret inst given. Disposition:  Diagnosis:   1. Dermatitis    2.  Cellulitis, unspecified cellulitis site        Disposition: home    Follow-up Information     Follow up With Details Comments [address], [place] Schedule an appointment as soon as possible for a visit in 1 day  [address] [facility]             Patient's Medications   Start Taking    ACETAMINOPHEN-CODEINE (TYLENOL-CODEINE #3) 300-30 MG PER TABLET    Take 1 Tab by mouth every six (6) hours as needed for Pain. Max Daily Amount: 4 Tabs. ONDANSETRON (ZOFRAN ODT) 4 MG DISINTEGRATING TABLET    Take 1 Tab by mouth every eight (8) hours as needed for Nausea. TRIMETHOPRIM-SULFAMETHOXAZOLE (BACTRIM DS) 160-800 MG PER TABLET    Take 1 Tab by mouth two (2) times a day for 7 days. Continue Taking    COLCHICINE 0.6 MG TABLET    Take 1 Tab by mouth daily. Take 3 times daily on day 1. Then twice daily on days two and three. FERROUS SULFATE (IRON) 325 MG (65 MG IRON) EC TABLET    Take 1 Tab by mouth two (2) times daily (with meals). MELOXICAM (MOBIC) 7.5 MG TABLET    TAKE ONE TABLET BY MOUTH TWICE DAILY WITH MEALS    MELOXICAM (MOBIC) 7.5 MG TABLET    Take 1 Tab by mouth two (2) times a day. OMEPRAZOLE (PRILOSEC) 20 MG CAPSULE    Take 20 mg by mouth daily. TRAMADOL (ULTRAM) 50 MG TABLET    Take 50 mg by mouth every six (6) hours as needed for Pain. TRAMADOL (ULTRAM) 50 MG TABLET    Take 1 Tab by mouth nightly as needed for Pain. Max Daily Amount: 50 mg. Indications: Pain   These Medications have changed    No medications on file   Stop Taking    ACETAMINOPHEN-CODEINE (TYLENOL-CODEINE #3) 300-30 MG PER TABLET    Take 1 Tab by mouth every six (6) hours as needed for Pain. Max Daily Amount: 4 Tabs. Scribe Attestation      Jacqueline Velazco MD acting as a scribe for and in the presence of Jacqueline Velazco MD      October 06, 2017 at 6:10 AM       Provider Attestation:      I personally performed the services described in the documentation, reviewed the documentation, as recorded by the scribe in my presence, and it accurately and completely records my words and actions.  October 06, 2017 at 6:10 AM - Jacqueline Velazco MD

## 2018-03-16 DIAGNOSIS — M25.561 CHRONIC PAIN OF BOTH KNEES: ICD-10-CM

## 2018-03-16 DIAGNOSIS — G89.29 CHRONIC PAIN OF BOTH KNEES: ICD-10-CM

## 2018-03-16 DIAGNOSIS — M25.562 CHRONIC PAIN OF BOTH KNEES: ICD-10-CM

## 2018-03-16 RX ORDER — MELOXICAM 7.5 MG/1
7.5 TABLET ORAL 2 TIMES DAILY
Qty: 60 TAB | Refills: 1 | Status: SHIPPED | OUTPATIENT
Start: 2018-03-16 | End: 2018-07-12 | Stop reason: SDUPTHER

## 2018-03-16 NOTE — TELEPHONE ENCOUNTER
Last Visit: 06/02/2017 with MD Jennifer Carson    Next Appointment: noted to f/u prn since completing Synvisc injection series   Previous Refill Encounters: 09/21/2017 per [facility] Fall [facility]60 with 1 refill     Requested Prescriptions     Pending Prescriptions Disp Refills    meloxicam (MOBIC) 7.5 mg tablet 60 Tab 1     Sig: Take 1 Tab by mouth two (2) times a day.

## 2018-04-27 ENCOUNTER — OFFICE VISIT (OUTPATIENT)
Dept: ORTHOPEDIC SURGERY | Facility: CLINIC | Age: 53
End: 2018-04-27

## 2018-04-27 VITALS
HEART RATE: 87 BPM | SYSTOLIC BLOOD PRESSURE: 137 MMHG | WEIGHT: 197.6 LBS | TEMPERATURE: 98.2 F | BODY MASS INDEX: 41.48 KG/M2 | OXYGEN SATURATION: 100 % | RESPIRATION RATE: 18 BRPM | DIASTOLIC BLOOD PRESSURE: 62 MMHG | HEIGHT: 58 IN

## 2018-04-27 DIAGNOSIS — M17.11 PRIMARY OSTEOARTHRITIS OF RIGHT KNEE: Primary | ICD-10-CM

## 2018-04-27 DIAGNOSIS — M17.12 PRIMARY OSTEOARTHRITIS OF LEFT KNEE: ICD-10-CM

## 2018-04-27 DIAGNOSIS — G89.29 CHRONIC PAIN OF BOTH KNEES: ICD-10-CM

## 2018-04-27 DIAGNOSIS — M25.561 CHRONIC PAIN OF BOTH KNEES: ICD-10-CM

## 2018-04-27 DIAGNOSIS — M25.562 CHRONIC PAIN OF BOTH KNEES: ICD-10-CM

## 2018-04-27 RX ORDER — BETAMETHASONE SODIUM PHOSPHATE AND BETAMETHASONE ACETATE 3; 3 MG/ML; MG/ML
6 INJECTION, SUSPENSION INTRA-ARTICULAR; INTRALESIONAL; INTRAMUSCULAR; SOFT TISSUE ONCE
Qty: 0.5 ML | Refills: 0
Start: 2018-04-27 | End: 2018-04-27

## 2018-04-27 RX ORDER — BUPIVACAINE HYDROCHLORIDE 2.5 MG/ML
8 INJECTION, SOLUTION EPIDURAL; INFILTRATION; INTRACAUDAL ONCE
Qty: 8 ML | Refills: 0
Start: 2018-04-27 | End: 2018-04-27

## 2018-04-27 NOTE — LETTER
NOTIFICATION RETURN TO WORK / SCHOOL 
 
4/27/2018 11:30 AM 
 
Ms. Maria R Brown [place] [address] To Whom It May Concern: 
 
Maria R Brown is currently under the care of [address] [place] [address]. She was seen for an appointment today. Please excuse her from work today. If there are questions or concerns please have the patient contact our office.  
 
 
 
Sincerely, 
 
 
Darrius Rajan MD

## 2018-04-27 NOTE — PROGRESS NOTES
Patient: Gregory Carrel                MRN: [id number]       SSN: [id number]  YOB: 1965        AGE: 50 y.o. SEX: female    PCP: Mary Jeffery MD  04/27/18    Chief Complaint   Patient presents with    Knee Pain     [place]     HISTORY:  Gregory Carrel is a 50 y.o. female who is seen for increased bilateral knee (R>L) pain and swelling. She was previously seen by Dr. Jessica Roger in February of 2017. She responded to a knee cortisone injection. .  She did not wish to be seen by Dr. Jessica Roger again since she felt like he was not listening to her. She notes increased bilateral knee pain for about a year. She notes pain with standing and walking. She completed a bilateral Synvisc series on 6/2/17 with minimal relief. 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 She has high blood pressure. She is not diabetic. She reports that she has lost 15 pounds recently by cutting back on greasy foods and drinking more water. Current weight is 197 pounds. She is 4'10\" tall. She lives alone in [place]. She says that her 68-year-old daughter lives across the street. She has a 68-year-old son who lives in [place], and she has a 68-year-old son who is in the [facility]. Her son in the [employer] is stationed in [place] and will return to the [facility] [employer] in October. Her oldest son has three children and her daughter has five children. She walks regularly for exercise. She is allergic to latex.       Weight Metrics 4/27/2018 10/6/2017 6/2/2017 5/26/2017 5/19/2017 4/10/2017 2/15/2017   Weight 197 lb 9.6 oz 195 lb 197 lb 198 lb 198 lb 12.8 oz 185 lb 190 lb   BMI 41.3 kg/m2 40.76 kg/m2 41.17 kg/m2 41.38 kg/m2 41.55 kg/m2 38.67 kg/m2 39.71 kg/m2     Patient Active Problem List   Diagnosis Code    Acute pharyngitis J02.9    Obesity (BMI 35.0-39.9 without comorbidity) E66.9    BMI 38.0-38.9,adult Z68.38    BMI 40.0-44.9, adult (HCC) Z68.41     REVIEW OF SYSTEMS: All Below are Negative except: See HPI   Constitutional: negative for fever, chills, and weight loss. Cardiovascular: negative for chest pain, claudication, leg swelling, SOB, FLORES   Gastrointestinal: Negative for pain, N/V/C/D, Blood in stool or urine, dysuria,  hematuria, incontinence, pelvic pain. Musculoskeletal: See HPI   Neurological: Negative for dizziness and weakness. Negative for headaches, Visual changes, confusion, seizures   Phychiatric/Behavioral: Negative for depression, memory loss, substance  abuse. Extremities: Negative for hair changes, rash, or skin lesion changes. Hematologic: Negative for bleeding problems, bruising, pallor or swollen lymph  nodes   Peripheral Vascular: No calf pain, no circulation deficits. Social History     Social History    Marital status: SINGLE     Spouse name: N/A    Number of children: N/A    Years of education: N/A     Occupational History    Not on file.      Social History Main Topics    Smoking status: Current Every Day Smoker     Packs/day: 0.25     Years: 15.00    Smokeless tobacco: Never Used    Alcohol use Yes      Comment: occasionally    Drug use: No    Sexual activity: Not on file     Other Topics Concern    Not on file     Social History Narrative      Allergies   Allergen Reactions    Latex Rash    Penicillins Other (comments)      Current Outpatient Prescriptions   Medication Sig    meloxicam (MOBIC) 7.5 mg tablet TAKE ONE TABLET BY MOUTH TWICE DAILY WITH MEALS    omeprazole (PRILOSEC) 20 mg capsule Take 20 mg by mouth daily.  meloxicam (MOBIC) 7.5 mg tablet Take 1 Tab by mouth two (2) times a day.  acetaminophen-codeine (TYLENOL-CODEINE #3) 300-30 mg per tablet Take 1 Tab by mouth every six (6) hours as needed for Pain. Max Daily Amount: 4 Tabs.  ondansetron (ZOFRAN ODT) 4 mg disintegrating tablet Take 1 Tab by mouth every eight (8) hours as needed for Nausea.  traMADol (ULTRAM) 50 mg tablet Take 1 Tab by mouth nightly as needed for Pain. Max Daily Amount: 50 mg. Indications: Pain    traMADol (ULTRAM) 50 mg tablet Take 50 mg by mouth every six (6) hours as needed for Pain.  colchicine 0.6 mg tablet Take 1 Tab by mouth daily. Take 3 times daily on day 1. Then twice daily on days two and three.  ferrous sulfate (IRON) 325 mg (65 mg iron) EC tablet Take 1 Tab by mouth two (2) times daily (with meals). No current facility-administered medications for this visit. PHYSICAL EXAMINATION:  Visit Vitals    /62    Pulse 87    Temp 98.2 °F (36.8 °C) (Oral)    Resp 18    Ht 4' 10\" (1.473 m)    Wt 197 lb 9.6 oz (89.6 kg)    SpO2 100%    BMI 41.3 kg/m2      ORTHO EXAMINATION:  Examination Right knee Left knee   Skin Intact Intact   Range of motion 100-0 100-0   Effusion + -   Medial joint line tenderness + +   Lateral joint line tenderness - -   Popliteal tenderness - +, fullness   Osteophytes palpable + +   Rajnis - -   Patella crepitus + +   Anterior drawer - -   Lateral laxity - -   Medial laxity - -   Varus deformity +, mild -   Valgus deformity - +, mild   Pretibial edema - -   Calf tenderness - -     PROCEDURE:  After discussing treatment options, patient's knees were injected with 4 cc Marcaine and 1/2 cc Celestone.     Chart reviewed for the following:   Maykel Garcias MD, have reviewed the History, Physical and updated the Allergic reactions for [facility]     TIME OUT performed immediately prior to start of procedure:  Maykel Garcias MD, have performed the following reviews on Tamra Calzada prior to the start of the procedure:            * Patient was identified by name and date of birth   * Agreement on procedure being performed was verified  * Risks and Benefits explained to the patient  * Procedure site verified and marked as necessary  * Patient was positioned for comfort  * Consent was obtained     Time: 11:29 AM     Date of procedure: 4/27/2018    Procedure performed by:  Jorge Webb MD    Ms. Rose tolerated the procedure well with no complications. RADIOGRAPHS:  XR MICHAELLE KNEES 5/26/15  IMPRESSION:  No fractures, no effusion, severe medial joint space narrowing on the right, severe lateral joint space narrowing on the left, no osteophytes present. XR LEFT KNEE 3/3/15  IMPRESSION:  Moderate suprapatellar joint effusion. XR RIGHT KNEE 7/3/13  IMPRESSION:  Moderate changes of osteoarthritis. Large suprapatellar effusion. IMPRESSION:      ICD-10-CM ICD-9-CM    1. Primary osteoarthritis of right knee M17.11 715.16 betamethasone (CELESTONE SOLUSPAN) 6 mg/mL injection      BETAMETHASONE ACETATE & SODIUM PHOSPHATE INJECTION 3 MG EA.      DRAIN/INJECT LARGE JOINT/BURSA      bupivacaine, PF, (MARCAINE, PF,) 0.25 % (2.5 mg/mL) injection   2. Primary osteoarthritis of left knee M17.12 715.16 betamethasone (CELESTONE SOLUSPAN) 6 mg/mL injection      BETAMETHASONE ACETATE & SODIUM PHOSPHATE INJECTION 3 MG EA.      DRAIN/INJECT LARGE JOINT/BURSA      bupivacaine, PF, (MARCAINE, PF,) 0.25 % (2.5 mg/mL) injection   3. Chronic pain of both knees M25.561 719.46 betamethasone (CELESTONE SOLUSPAN) 6 mg/mL injection    M25.562 338.29 BETAMETHASONE ACETATE & SODIUM PHOSPHATE INJECTION 3 MG EA.    G89.29  DRAIN/INJECT LARGE JOINT/BURSA      bupivacaine, PF, (MARCAINE, PF,) 0.25 % (2.5 mg/mL) injection   4.  BMI 40.0-44.9, adult ([facility]) Z68.41 V85.41      PLAN: After discussing treatment options, patient's knees were injected with 4 cc Marcaine and 1/2 cc Celestone. She will follow up as needed. Consider visco supplementation if pain continues- Hymovis samples. We discussed possible need for right knee arthroplasty potentially unicompartmental at some time in the future pending weight loss. She was provided with a note for work today.      Scribed by Rand Gamez ([facility]) as dictated by Betty Buckner MD

## 2018-04-27 NOTE — PATIENT INSTRUCTIONS
Knee Arthritis: Exercises  Your Care Instructions  Here are some examples of exercises for knee arthritis. Start each exercise slowly. Ease off the exercise if you start to have pain. Your doctor or physical therapist will tell you when you can start these exercises and which ones will work best for you. How to do the exercises  Knee flexion with heel slide    1. Lie on your back with your knees bent. 2. Slide your heel back by bending your affected knee as far as you can. Then hook your other foot around your ankle to help pull your heel even farther back. 3. Hold for about 6 seconds, then rest for up to 10 seconds. 4. Repeat 8 to 12 times. 5. Switch legs and repeat steps 1 through 4, even if only one knee is sore. Quad sets    1. Sit with your affected leg straight and supported on the floor or a firm bed. Place a small, rolled-up towel under your knee. Your other leg should be bent, with that foot flat on the floor. 2. Tighten the thigh muscles of your affected leg by pressing the back of your knee down into the towel. 3. Hold for about 6 seconds, then rest for up to 10 seconds. 4. Repeat 8 to 12 times. 5. Switch legs and repeat steps 1 through 4, even if only one knee is sore. Straight-leg raises to the front    1. Lie on your back with your good knee bent so that your foot rests flat on the floor. Your affected leg should be straight. Make sure that your low back has a normal curve. You should be able to slip your hand in between the floor and the small of your back, with your palm touching the floor and your back touching the back of your hand. 2. Tighten the thigh muscles in your affected leg by pressing the back of your knee flat down to the floor. Hold your knee straight. 3. Keeping the thigh muscles tight and your leg straight, lift your affected leg up so that your heel is about 12 inches off the floor. Hold for about 6 seconds, then lower slowly.   4. Relax for up to 10 seconds between repetitions. 5. Repeat 8 to 12 times. 6. Switch legs and repeat steps 1 through 5, even if only one knee is sore. Active knee flexion    1. Lie on your stomach with your knees straight. If your kneecap is uncomfortable, roll up a washcloth and put it under your leg just above your kneecap. 2. Lift the foot of your affected leg by bending the knee so that you bring the foot up toward your buttock. If this motion hurts, try it without bending your knee quite as far. This may help you avoid any painful motion. 3. Slowly move your leg up and down. 4. Repeat 8 to 12 times. 5. Switch legs and repeat steps 1 through 4, even if only one knee is sore. Quadriceps stretch (facedown)    1. Lie flat on your stomach, and rest your face on the floor. 2. Wrap a towel or belt strap around the lower part of your affected leg. Then use the towel or belt strap to slowly pull your heel toward your buttock until you feel a stretch. 3. Hold for about 15 to 30 seconds, then relax your leg against the towel or belt strap. 4. Repeat 2 to 4 times. 5. Switch legs and repeat steps 1 through 4, even if only one knee is sore. Stationary exercise bike    1. If you do not have a stationary exercise bike at home, you can find one to ride at your local health club or community center. 2. Adjust the height of the bike seat so that your knee is slightly bent when your leg is extended downward. If your knee hurts when the pedal reaches the top, you can raise the seat so that your knee does not bend as much. 3. Start slowly. At first, try to do 5 to 10 minutes of cycling with little to no resistance. Then increase your time and the resistance bit by bit until you can do 20 to 30 minutes without pain. 4. If you start to have pain, rest your knee until your pain gets back to the level that is normal for you. Or cycle for less time or with less effort. Follow-up care is a key part of your treatment and safety.  Be sure to make and go to all appointments, and call your doctor if you are having problems. It's also a good idea to know your test results and keep a list of the medicines you take. Where can you learn more? Go to http://macy-ana m.info/. Enter C159 in the search box to learn more about \"Knee Arthritis: Exercises. \"  Current as of: March 21, 2017  Content Version: 11.4  © 2006-2017 [employer]. Care instructions adapted under license by [employer] (which disclaims liability or warranty for this information). If you have questions about a medical condition or this instruction, always ask your healthcare professional. [place] [address] any warranty or liability for your use of this information. Joint Injections: Care Instructions  Your Care Instructions  Joint injections are shots into a joint, such as the knee. They may be used to put in medicines, such as pain relievers. Or they can be used to take out fluid. Sometimes the fluid is tested in a lab. This can help find the cause of a joint problem. A corticosteroid, or steroid, shot is used to reduce inflammation in tendons or joints. It is often used to treat problems such as arthritis, tendinitis, and bursitis. Steroids can be injected directly into a painful, inflamed joint. They can also help reduce inflammation of a bursa. A bursa is a sac of fluid. It cushions and lubricates areas where tendons, ligaments, skin, muscles, or bones rub against each other. A steroid shot can sometimes help with short-term pain relief when other treatments haven't worked. If steroid shots help, pain may improve for weeks or months. Follow-up care is a key part of your treatment and safety. Be sure to make and go to all appointments, and call your doctor if you are having problems. It's also a good idea to know your test results and keep a list of the medicines you take. How can you care for yourself at home?   · Put ice or a cold pack on the area for 10 to 20 minutes at a time. Put a thin cloth between the ice and your skin. · Take anti-inflammatory medicines to reduce pain, swelling, or inflammation. These include ibuprofen (Advil, Motrin) and naproxen (Aleve). Read and follow all instructions on the label. · Avoid strenuous activities for several days, especially those that put stress on the area where you got the shot. · If you have dressings over the area, keep them clean and dry. You may remove them when your doctor tells you to. When should you call for help? Call your doctor now or seek immediate medical care if:  ? · You have signs of infection, such as:  ¨ Increased pain, swelling, warmth, or redness. ¨ Red streaks leading from the site. ¨ Pus draining from the site. ¨ A fever. ? Watch closely for changes in your health, and be sure to contact your doctor if you have any problems. Where can you learn more? Go to http://macy-ana m.info/. Enter N616 in the search box to learn more about \"Joint Injections: Care Instructions. \"  Current as of: March 21, 2017  Content Version: 11.4  © 0280-4511 [employer]. Care instructions adapted under license by [employer] (which disclaims liability or warranty for this information). If you have questions about a medical condition or this instruction, always ask your healthcare professional. [place] [address] any warranty or liability for your use of this information.

## 2018-05-23 ENCOUNTER — HOSPITAL ENCOUNTER (EMERGENCY)
Age: 53
Discharge: HOME | End: 2018-05-23
Attending: EMERGENCY MEDICINE
Payer: SELF-PAY

## 2018-05-23 ENCOUNTER — APPOINTMENT (OUTPATIENT)
Dept: GENERAL RADIOLOGY | Age: 53
End: 2018-05-23
Attending: PHYSICIAN ASSISTANT
Payer: SELF-PAY

## 2018-05-23 VITALS
HEIGHT: 58 IN | RESPIRATION RATE: 18 BRPM | DIASTOLIC BLOOD PRESSURE: 49 MMHG | WEIGHT: 200 LBS | HEART RATE: 89 BPM | BODY MASS INDEX: 41.98 KG/M2 | TEMPERATURE: 99.3 F | OXYGEN SATURATION: 98 % | SYSTOLIC BLOOD PRESSURE: 113 MMHG

## 2018-05-23 DIAGNOSIS — S92.514A CLOSED NONDISPLACED FRACTURE OF PROXIMAL PHALANX OF LESSER TOE OF RIGHT FOOT, INITIAL ENCOUNTER: Primary | ICD-10-CM

## 2018-05-23 PROCEDURE — 99283 EMERGENCY DEPT VISIT LOW MDM: CPT

## 2018-05-23 PROCEDURE — 73630 X-RAY EXAM OF FOOT: CPT

## 2018-05-23 RX ORDER — HYDROCODONE BITARTRATE AND ACETAMINOPHEN 5; 325 MG/1; MG/1
TABLET ORAL
Qty: 6 TAB | Refills: 0 | Status: SHIPPED | OUTPATIENT
Start: 2018-05-23 | End: 2019-06-02

## 2018-05-23 NOTE — ED PROVIDER NOTES
EMERGENCY DEPARTMENT HISTORY AND PHYSICAL EXAM    Date: 5/23/2018  Patient Name: Tami Azul    History of Presenting Illness     Chief Complaint   Patient presents with    Toe Pain         History Provided By: Patient    Chief Complaint: toe pain   Duration: 1 Days  Timing:  Acute  Location: right 2nd toe  Quality: Aching  Severity: Mild  Modifying Factors: none  Associated Symptoms: denies any other associated signs or symptoms      Additional History (Context): Tami Azul is a 50 y.o. female with No significant past medical history who presents with right 2nd toe pain x 1 day. State she slipped on a wet floor and her foot struck the wall. Took ibuprofen without improvement in pain. Denies other injury. No other complaints. PCP: Jimbo Negron MD    Current Outpatient Prescriptions   Medication Sig Dispense Refill    meloxicam (MOBIC) 7.5 mg tablet Take 1 Tab by mouth two (2) times a day. 60 Tab 1    acetaminophen-codeine (TYLENOL-CODEINE #3) 300-30 mg per tablet Take 1 Tab by mouth every six (6) hours as needed for Pain. Max Daily Amount: 4 Tabs. 15 Tab 0    ondansetron (ZOFRAN ODT) 4 mg disintegrating tablet Take 1 Tab by mouth every eight (8) hours as needed for Nausea. 14 Tab 0    traMADol (ULTRAM) 50 mg tablet Take 1 Tab by mouth nightly as needed for Pain. Max Daily Amount: 50 mg. Indications: Pain 14 Tab 2    meloxicam (MOBIC) 7.5 mg tablet TAKE ONE TABLET BY MOUTH TWICE DAILY WITH MEALS 60 Tab 0    traMADol (ULTRAM) 50 mg tablet Take 50 mg by mouth every six (6) hours as needed for Pain.  colchicine 0.6 mg tablet Take 1 Tab by mouth daily. Take 3 times daily on day 1. Then twice daily on days two and three. 7 Tab 0    omeprazole (PRILOSEC) 20 mg capsule Take 20 mg by mouth daily.  ferrous sulfate (IRON) 325 mg (65 mg iron) EC tablet Take 1 Tab by mouth two (2) times daily (with meals).  61 Tab 1       Past History     Past Medical History:  Past Medical History: Diagnosis Date    BMI 38.0-38.9,adult 5/19/2017    BMI 40.0-44.9, adult ([facility] [address] 5/19/2017    Obesity (BMI 35.0-39.9 without comorbidity) 4/10/2017    Osteoarthritis     Ulcer     gi ulcer       Past Surgical History:  Past Surgical History:   Procedure Laterality Date    ABDOMEN SURGERY PROC UNLISTED      hernia    HX GYN      D&C       Family History:  Family History   Problem Relation Age of Onset    Arthritis-osteo Other        Social History:  Social History   Substance Use Topics    Smoking status: Current Every Day Smoker     Packs/day: 0.25     Years: 15.00    Smokeless tobacco: Never Used    Alcohol use Yes      Comment: occasionally       Allergies: Allergies   Allergen Reactions    Latex Rash    Penicillins Other (comments)         Review of Systems   Review of Systems   Musculoskeletal: Positive for arthralgias. All other systems reviewed and are negative. All Other Systems Negative  Physical Exam     Vitals:    05/23/18 1501   BP: 113/49   Pulse: 89   Resp: 18   Temp: 99.3 °F (37.4 °C)   SpO2: 98%   Weight: 90.7 kg (200 lb)   Height: 4' 10\" (1.473 m)     Physical Exam   Constitutional: She is oriented to person, place, and time. She appears well-developed and well-nourished. No distress. HENT:   Head: Normocephalic and atraumatic. Eyes: Conjunctivae are normal.   Neck: Normal range of motion. Neck supple. Cardiovascular: Normal rate, regular rhythm and normal heart sounds. Pulmonary/Chest: Effort normal and breath sounds normal. No respiratory distress. She has no wheezes. She has no rales. Musculoskeletal: Normal range of motion. Right 2nd toe TTP. No overlying ecchymosis or swelling. DP pulse 2+. Neurological: She is alert and oriented to person, place, and time. Skin: Skin is warm and dry. Psychiatric: She has a normal mood and affect. Her behavior is normal. Judgment and thought content normal.   Nursing note and vitals reviewed.              Diagnostic Study Results     Labs -   No results found for this or any previous visit (from the past 12 hour(s)). Radiologic Studies -   XR FOOT RT MIN 3 V    (Results Pending)     CT Results  (Last 48 hours)    None        CXR Results  (Last 48 hours)    None            Medical Decision Making   I am the first provider for this patient. I reviewed the vital signs, available nursing notes, past medical history, past surgical history, family history and social history. Records Reviewed: Nursing Notes and Old Medical Records    Procedures:  Splint, Other  Date/Time: 5/23/2018 3:29 PM  Performed by: Bryon Moise  Authorized by: Bryon Moise     Consent:     Consent obtained:  Verbal    Consent given by:  Patient and parent    Risks discussed:  Discoloration, numbness and pain    Alternatives discussed:  No treatment  Pre-procedure details:     Sensation:  Normal  Procedure details:     Laterality:  Right    Location:  Foot    Splint type: cast shoe. Supplies:  Prefabricated splint  Post-procedure details:     Pain:  Improved    Sensation:  Normal    Patient tolerance of procedure: Tolerated well, no immediate complications        Provider Notes (Medical Decision Making):     DDX: contusion, fx    3:29 PM Pt well appearing and in NAD. fx noted to proximal phalanx of 2nd toe. Buddy tape, cast shoe, podiatry referral.    MED RECONCILIATION:  No current facility-administered medications for this encounter. Current Outpatient Prescriptions   Medication Sig    meloxicam (MOBIC) 7.5 mg tablet Take 1 Tab by mouth two (2) times a day.  acetaminophen-codeine (TYLENOL-CODEINE #3) 300-30 mg per tablet Take 1 Tab by mouth every six (6) hours as needed for Pain. Max Daily Amount: 4 Tabs.  ondansetron (ZOFRAN ODT) 4 mg disintegrating tablet Take 1 Tab by mouth every eight (8) hours as needed for Nausea.  traMADol (ULTRAM) 50 mg tablet Take 1 Tab by mouth nightly as needed for Pain. Max Daily Amount: 50 mg.  Indications: Pain    meloxicam (MOBIC) 7.5 mg tablet TAKE ONE TABLET BY MOUTH TWICE DAILY WITH MEALS    traMADol (ULTRAM) 50 mg tablet Take 50 mg by mouth every six (6) hours as needed for Pain.  colchicine 0.6 mg tablet Take 1 Tab by mouth daily. Take 3 times daily on day 1. Then twice daily on days two and three.  omeprazole (PRILOSEC) 20 mg capsule Take 20 mg by mouth daily.  ferrous sulfate (IRON) 325 mg (65 mg iron) EC tablet Take 1 Tab by mouth two (2) times daily (with meals). Disposition:  discharge    DISCHARGE NOTE:     Patient is improved, resting quietly and comfortably. The patient will be discharged home.      The patient was reassured that these symptoms do not appear to represent a serious or life threatening condition at this time. Warning signs of worsening condition were discussed and understood by the patient.      Based on patient's age, coexisting illness, exam, and the results of this ED evaluation, the decision to treat as an outpatient was made. Based on the information available at time of discharge, acute pathology requiring immediate intervention was deemed relative unlikely. While it is impossible to completely exclude the possibility of underlying serious disease or worsening of condition, I feel the relative likelihood is extremely low. I discussed this uncertainty with the patient, who understood ED evaluation and treatment and felt comfortable with the outpatient treatment plan.      All questions regarding care, test results, and follow up were answered. The patient is stable and appropriate to discharge. They understand that they should return to the emergency department for any new or worsening symptoms. I stressed the importance of follow up for repeat assessment and possibly further evaluation/treatment.     Follow-up Information     Follow up With Details Comments Contact Info    Efraín Mistry DPM Call To make a follow up appointment (podiatry) [address]  [phone]  [address] [facility] EMERGENCY DEPT  Immediately if symptoms worsen [address] [facility]  [phone]          Current Discharge Medication List      CONTINUE these medications which have NOT CHANGED    Details   !! meloxicam (MOBIC) 7.5 mg tablet Take 1 Tab by mouth two (2) times a day. Qty: 60 Tab, Refills: 1    Associated Diagnoses: Chronic pain of both knees      acetaminophen-codeine (TYLENOL-CODEINE #3) 300-30 mg per tablet Take 1 Tab by mouth every six (6) hours as needed for Pain. Max Daily Amount: 4 Tabs. Qty: 15 Tab, Refills: 0      ondansetron (ZOFRAN ODT) 4 mg disintegrating tablet Take 1 Tab by mouth every eight (8) hours as needed for Nausea. Qty: 14 Tab, Refills: 0      !! traMADol (ULTRAM) 50 mg tablet Take 1 Tab by mouth nightly as needed for Pain. Max Daily Amount: 50 mg. Indications: Pain  Qty: 14 Tab, Refills: 2    Comments: [place] Regulation 18V SN71-18-03 dated March 15, 2017, states, \" acute pain\" medication shall not be prescribed by a medical provider with a short acting opioid for more than 7 days. !! meloxicam (MOBIC) 7.5 mg tablet TAKE ONE TABLET BY MOUTH TWICE DAILY WITH MEALS  Qty: 60 Tab, Refills: 0    Associated Diagnoses: Chronic pain of both knees      !! traMADol (ULTRAM) 50 mg tablet Take 50 mg by mouth every six (6) hours as needed for Pain. colchicine 0.6 mg tablet Take 1 Tab by mouth daily. Take 3 times daily on day 1. Then twice daily on days two and three. Qty: 7 Tab, Refills: 0      omeprazole (PRILOSEC) 20 mg capsule Take 20 mg by mouth daily. ferrous sulfate (IRON) 325 mg (65 mg iron) EC tablet Take 1 Tab by mouth two (2) times daily (with meals). Qty: 60 Tab, Refills: 1       !! - Potential duplicate medications found. Please discuss with provider. Diagnosis     Clinical Impression:   1.  Closed nondisplaced fracture of proximal phalanx of lesser toe of right foot, initial encounter

## 2018-05-23 NOTE — DISCHARGE INSTRUCTIONS
Broken Toe: Care Instructions  Your Care Instructions  You have broken (fractured) a bone in your toe. This kind of fracture does not need a special cast or brace. \"Salomón-taping\" your broken toe to a healthy toe next to it is almost always enough to treat the problem and ease symptoms. The toe may take 4 weeks or more to heal.  You heal best when you take good care of yourself. Eat a variety of healthy foods, and don't smoke. Follow-up care is a key part of your treatment and safety. Be sure to make and go to all appointments, and call your doctor if you are having problems. It's also a good idea to know your test results and keep a list of the medicines you take. How can you care for yourself at home? · Be safe with medicines. Take pain medicines exactly as directed. ¨ If the doctor gave you a prescription medicine for pain, take it as prescribed. ¨ If you are not taking a prescription pain medicine, ask your doctor if you can take an over-the-counter medicine. · If your toe is taped to the toe next to it, your doctor has shown you how to change the tape. Protect the skin by putting something soft, such as felt or foam, between your toes before you tape them together. Never tape the toes together skin-to-skin. Your broken toe may need to be salomón-taped for 2 to 4 weeks to heal.  · Rest and protect your toe. Do not walk on it until you can do so without too much pain. If the doctor has told you to use crutches, use them as instructed. · Put ice or a cold pack on your toe for 10 to 20 minutes at a time. Try to do this every 1 to 2 hours for the next 3 days (when you are awake) or until the swelling goes down. Put a thin cloth between the ice and your skin. · Prop up your foot on a pillow when you ice it or anytime you sit or lie down. Try to keep it above the level of your heart. This will help reduce swelling. · Make sure you go to your follow-up appointments.  Your doctor will need to check that your toe is healing right. When should you call for help? Call your doctor now or seek immediate medical care if:  ? · You have severe pain. ? · Your toe is cool or pale or changes color. ? · You have tingling, weakness, or numbness in your toe. ? Watch closely for changes in your health, and be sure to contact your doctor if:  ? · Pain and swelling get worse. ? · You are not getting better as expected. Where can you learn more? Go to http://macy-ana m.info/. Enter Y601 in the search box to learn more about \"Broken Toe: Care Instructions. \"  Current as of: March 21, 2017  Content Version: 11.4  © 1112-5680 Healthwise, [employer]. Care instructions adapted under license by [employer] (which disclaims liability or warranty for this information). If you have questions about a medical condition or this instruction, always ask your healthcare professional. [address] any warranty or liability for your use of this information.

## 2018-05-23 NOTE — LETTER
[facility] EMERGENCY DEPT 
[address] [facility] [address] 
[phone] Work/School Note Date: 5/23/2018 To Whom It May concern: 
 
Sharyle Albert was seen and treated today in the emergency room by the following provider(s): 
Attending Provider: Poncho Ragland DO Physician Assistant: Abner Zarate PA-C. Sharyle Albert may return to work on 5/28/2018.  
 
Sincerely, 
 
 
 
 
Gloria Headley RN

## 2018-06-04 ENCOUNTER — OFFICE VISIT (OUTPATIENT)
Dept: ORTHOPEDIC SURGERY | Facility: CLINIC | Age: 53
End: 2018-06-04

## 2018-06-04 ENCOUNTER — DOCUMENTATION ONLY (OUTPATIENT)
Dept: ORTHOPEDIC SURGERY | Facility: CLINIC | Age: 53
End: 2018-06-04

## 2018-06-04 VITALS
DIASTOLIC BLOOD PRESSURE: 57 MMHG | SYSTOLIC BLOOD PRESSURE: 128 MMHG | OXYGEN SATURATION: 100 % | BODY MASS INDEX: 41.64 KG/M2 | HEIGHT: 58 IN | RESPIRATION RATE: 18 BRPM | WEIGHT: 198.4 LBS | HEART RATE: 100 BPM | TEMPERATURE: 98.3 F

## 2018-06-04 DIAGNOSIS — S92.911A FRACTURE OF PROXIMAL PHALANX OF TOE OF RIGHT FOOT: Primary | ICD-10-CM

## 2018-06-04 DIAGNOSIS — M79.671 RIGHT FOOT PAIN: ICD-10-CM

## 2018-06-04 NOTE — LETTER
6/4/2018 10:30 AM 
 
Ms. Liriano Place [address] 
[address] THE ABOVE PATIENT WAS SEEN TODAY AND IS TO REMAIN OUT OF WORK UNTIL SEEN 6-27-18.  
 
 
 
Sincerely, 
 
 
 
Roz Frausto MD

## 2018-06-04 NOTE — PROGRESS NOTES
Patient: Gibson Horowitz                MRN: [id number]       SSN: [id number]  YOB: 1965        AGE: 50 y.o. SEX: female    PCP: Surendra Novak MD  06/04/18    Chief Complaint   Patient presents with    Knee Pain     Roque    Foot Pain     Right 2nd toe     HISTORY:  Gibson Horowitz is a 50 y.o. female who is seen for a recent right foot injury. She fell at home on a slippery wet floor 5/23/2018. She reports striking her right second toe upon falling. She was seen at [facility] where x rays were read as showing a second toe proximal phalanx fracture by the ED eric. Post op wooden shoe was provided. She reports pain and swelling since the injury--especially with ambulation. She was previously seen for increased bilateral knee (R>L) pain and swelling. She was previously seen by Dr. Bruce Schwarz in February of 2017. She responded to a knee cortisone injection. .  She did not wish to be seen by Dr. Bruce Schwarz again since she felt like he was not listening to her. She notes increased bilateral knee pain for about a year. She notes pain with standing and walking. She completed a bilateral Synvisc series on 6/2/17 with minimal relief. 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 She has high blood pressure. She is not diabetic.   She reports that she has lost 15 pounds recently by cutting back on greasy foods and drinking more water. Current weight is 197 pounds. She is 4'10\" tall. She lives alone in [place]. She says that her 25-year-old daughter lives across the street. She has a 17-year-old son who lives in [place], and she has a 71-year-old son who is in the [facility]. Her son in the [employer] is stationed in [place] and will return to the [employer] in October. Her oldest son has three children and her daughter has five children. She walks regularly for exercise. She is allergic to latex. Weight Metrics 6/4/2018 5/23/2018 4/27/2018 10/6/2017 6/2/2017 5/26/2017 5/19/2017   Weight 198 lb 6.4 oz 200 lb 197 lb 9.6 oz 195 lb 197 lb 198 lb 198 lb 12.8 oz   BMI 41.47 kg/m2 41.8 kg/m2 41.3 kg/m2 40.76 kg/m2 41.17 kg/m2 41.38 kg/m2 41.55 kg/m2     Patient Active Problem List   Diagnosis Code    Acute pharyngitis J02.9    Obesity (BMI 35.0-39.9 without comorbidity) E66.9    BMI 38.0-38.9,adult Z68.38    BMI 40.0-44.9, adult ([facility]) Z68.41     REVIEW OF SYSTEMS: All Below are Negative except: See HPI   Constitutional: negative for fever, chills, and weight loss. Cardiovascular: negative for chest pain, claudication, leg swelling, SOB, FLORES   Gastrointestinal: Negative for pain, N/V/C/D, Blood in stool or urine, dysuria,  hematuria, incontinence, pelvic pain. Musculoskeletal: See HPI   Neurological: Negative for dizziness and weakness. Negative for headaches, Visual changes, confusion, seizures   Phychiatric/Behavioral: Negative for depression, memory loss, substance  abuse. Extremities: Negative for hair changes, rash, or skin lesion changes. Hematologic: Negative for bleeding problems, bruising, pallor or swollen lymph  nodes   Peripheral Vascular: No calf pain, no circulation deficits. Social History     Social History    Marital status: SINGLE     Spouse name: N/A    Number of children: N/A    Years of education: N/A     Occupational History    Not on file.      Social History Main Topics    Smoking status: Current Every Day Smoker     Packs/day: 0.25     Years: 15.00    Smokeless tobacco: Never Used    Alcohol use Yes      Comment: occasionally    Drug use: No    Sexual activity: Not on file     Other Topics Concern    Not on file     Social History Narrative      Allergies   Allergen Reactions    Latex Rash    Penicillins Other (comments)      Current Outpatient Prescriptions   Medication Sig    ondansetron (ZOFRAN ODT) 4 mg disintegrating tablet Take 1 Tab by mouth every eight (8) hours as needed for Nausea.  meloxicam (MOBIC) 7.5 mg tablet TAKE ONE TABLET BY MOUTH TWICE DAILY WITH MEALS    omeprazole (PRILOSEC) 20 mg capsule Take 20 mg by mouth daily.  HYDROcodone-acetaminophen (NORCO) 5-325 mg per tablet Take 1-2 tablets PO every 4-6 hours as needed for pain control. If over the counter ibuprofen or acetaminophen was suggested, then only take the vicodin for pain not well controlled with the over the counter medication.  meloxicam (MOBIC) 7.5 mg tablet Take 1 Tab by mouth two (2) times a day.  acetaminophen-codeine (TYLENOL-CODEINE #3) 300-30 mg per tablet Take 1 Tab by mouth every six (6) hours as needed for Pain. Max Daily Amount: 4 Tabs.  traMADol (ULTRAM) 50 mg tablet Take 1 Tab by mouth nightly as needed for Pain. Max Daily Amount: 50 mg. Indications: Pain    traMADol (ULTRAM) 50 mg tablet Take 50 mg by mouth every six (6) hours as needed for Pain.  colchicine 0.6 mg tablet Take 1 Tab by mouth daily. Take 3 times daily on day 1. Then twice daily on days two and three.  ferrous sulfate (IRON) 325 mg (65 mg iron) EC tablet Take 1 Tab by mouth two (2) times daily (with meals). No current facility-administered medications for this visit.        PHYSICAL EXAMINATION:  Visit Vitals    /57    Pulse 100    Temp 98.3 °F (36.8 °C) (Oral)    Resp 18    Ht 4' 10\" (1.473 m)    Wt 198 lb 6.4 oz (90 kg)    SpO2 100%    BMI 41.47 kg/m2      ORTHO EXAMINATION:  Examination Right knee Left knee   Skin Intact Intact   Range of motion 100-0 100-0   Effusion + -   Medial joint line tenderness + +   Lateral joint line tenderness - -   Popliteal tenderness - +, fullness   Osteophytes palpable + +   Rajnis - -   Patella crepitus + +   Anterior drawer - -   Lateral laxity - -   Medial laxity - -   Varus deformity +, mild -   Valgus deformity - +, mild   Pretibial edema - -   Calf tenderness - -     Examination Right Ankle/Foot Left Ankle/Foot   Skin Intact Intact   Swelling Base of 2nd toe  -   Dorsiflexion 10 10   Plantarflexion 25 25   Deformity - -   Inversion laxity - -   Anterior drawer - -   Medial tenderness - -   Lateral tenderness - -   Heel cord Intact Intact   Sensation Intact Intact   Bunion - -   Toe nails Normal Normal   Capillary refill Normal Normal       Examination Left Ankle/Foot Right Ankle/Foot   Skin Intact Intact   Swelling - + second toe   Dorsiflexion 10 10   Plantarflexion 25 25   Deformity - -   Inversion laxity - -   Anterior drawer - -   Medial tenderness - -   Lateral tenderness - -   Heel cord Intact Intact   Sensation Intact Intact   Bunion - -   Toe nails Normal Normal   Capillary refill Normal Normal     significant tenderness/swelling over base of 2nd toe    RADIOGRAPHS:  XR RT FOOT 5/23/2018  IMPRESSION  1. No definite acute fracture. Likely old fracture of the second proximal  phalanx. Correlate clinically for point tenderness to exclude acuity.  -I have independently reviewed these images during this office visit. -Dr. Mely Hoyt:  Three views - small minimally displaced oblique fracture of 2nd phalynx, no bunion deformity, no heel spur. XR LT FOOT 6/8/2016  IMPRESSION:  1. No evidence of acute fracture. 2. Mild soft tissue swelling at the distal aspect of the foot. 3. Degenerative changes, as above.     XR MICHAELLE KNEES 5/26/15  IMPRESSION:  No fractures, no effusion, severe medial joint space narrowing on the right, severe lateral joint space narrowing on the left, no osteophytes present. XR LEFT KNEE 3/3/15  IMPRESSION:  Moderate suprapatellar joint effusion. XR RIGHT KNEE 7/3/13  IMPRESSION:  Moderate changes of osteoarthritis. Large suprapatellar effusion. IMPRESSION:      ICD-10-CM ICD-9-CM    1. Fracture of proximal phalanx of toe of right foot S92.911A 826.0 [place] CLOSED TX TOE FX   2. Right foot pain M79.671 729.5 [place] CLOSED TX TOE FX     PLAN: There is no need for surgery at this time. She will follow up in 4 weeks to follow up with re-xray. Gibson tape applied--2nd to 3rd toe. She was provided with a note to be excused from work for four weeks.     Scribed by Tracie Whelan [address]) as dictated by Renetta Hess MD

## 2018-06-05 ENCOUNTER — TELEPHONE (OUTPATIENT)
Dept: ORTHOPEDIC SURGERY | Age: 53
End: 2018-06-05

## 2018-06-08 ENCOUNTER — DOCUMENTATION ONLY (OUTPATIENT)
Dept: ORTHOPEDIC SURGERY | Age: 53
End: 2018-06-08

## 2018-06-19 ENCOUNTER — HOSPITAL ENCOUNTER (OUTPATIENT)
Age: 53
Setting detail: OBSERVATION
Discharge: HOME | End: 2018-06-20
Attending: EMERGENCY MEDICINE | Admitting: INTERNAL MEDICINE
Payer: SELF-PAY

## 2018-06-19 ENCOUNTER — HOSPITAL ENCOUNTER (EMERGENCY)
Age: 53
Discharge: OTHER INSTITUTION | End: 2018-06-19
Attending: EMERGENCY MEDICINE
Payer: SELF-PAY

## 2018-06-19 ENCOUNTER — APPOINTMENT (OUTPATIENT)
Dept: GENERAL RADIOLOGY | Age: 53
End: 2018-06-19
Attending: EMERGENCY MEDICINE
Payer: SELF-PAY

## 2018-06-19 VITALS
WEIGHT: 195 LBS | HEART RATE: 91 BPM | BODY MASS INDEX: 40.93 KG/M2 | TEMPERATURE: 99.1 F | DIASTOLIC BLOOD PRESSURE: 64 MMHG | HEIGHT: 58 IN | SYSTOLIC BLOOD PRESSURE: 148 MMHG | OXYGEN SATURATION: 100 % | RESPIRATION RATE: 19 BRPM

## 2018-06-19 DIAGNOSIS — D64.9 ANEMIA, UNSPECIFIED TYPE: Primary | ICD-10-CM

## 2018-06-19 DIAGNOSIS — D50.9 IRON DEFICIENCY ANEMIA, UNSPECIFIED IRON DEFICIENCY ANEMIA TYPE: Primary | ICD-10-CM

## 2018-06-19 LAB
ABO + RH BLD: NORMAL
AMORPH CRY URNS QL MICRO: ABNORMAL
ANION GAP SERPL CALC-SCNC: 6 MMOL/L (ref 3–18)
APPEARANCE UR: ABNORMAL
ATRIAL RATE: 78 BPM
BACTERIA URNS QL MICRO: ABNORMAL /HPF
BASOPHILS # BLD: 0.1 K/UL (ref 0–0.06)
BASOPHILS NFR BLD: 1 % (ref 0–2)
BILIRUB UR QL: NEGATIVE
BLOOD GROUP ANTIBODIES SERPL: NORMAL
BUN SERPL-MCNC: 13 MG/DL (ref 7–18)
BUN/CREAT SERPL: 21 (ref 12–20)
CALCIUM SERPL-MCNC: 9.2 MG/DL (ref 8.5–10.1)
CALCULATED P AXIS, ECG09: 54 DEGREES
CALCULATED R AXIS, ECG10: 26 DEGREES
CALCULATED T AXIS, ECG11: 53 DEGREES
CAOX CRY URNS QL MICRO: ABNORMAL
CHLORIDE SERPL-SCNC: 105 MMOL/L (ref 100–108)
CO2 SERPL-SCNC: 27 MMOL/L (ref 21–32)
COLOR UR: YELLOW
CREAT SERPL-MCNC: 0.61 MG/DL (ref 0.6–1.3)
DIAGNOSIS, 93000: NORMAL
DIFFERENTIAL METHOD BLD: ABNORMAL
EOSINOPHIL # BLD: 0.2 K/UL (ref 0–0.4)
EOSINOPHIL NFR BLD: 2 % (ref 0–5)
EPITH CASTS URNS QL MICRO: ABNORMAL /LPF (ref 0–5)
ERYTHROCYTE [DISTWIDTH] IN BLOOD BY AUTOMATED COUNT: 24 % (ref 11.6–14.5)
FERRITIN SERPL-MCNC: 6 NG/ML (ref 8–388)
FLUAV AG NPH QL IA: NEGATIVE
FLUBV AG NOSE QL IA: NEGATIVE
GLUCOSE SERPL-MCNC: 103 MG/DL (ref 74–99)
GLUCOSE UR STRIP.AUTO-MCNC: NEGATIVE MG/DL
HCT VFR BLD AUTO: 19.6 % (ref 35–45)
HCT VFR BLD AUTO: 28.7 % (ref 35–45)
HGB BLD-MCNC: 5 G/DL (ref 12–16)
HGB BLD-MCNC: 8.7 G/DL (ref 12–16)
HGB UR QL STRIP: NEGATIVE
INR PPP: 1.1 (ref 0.8–1.2)
KETONES UR QL STRIP.AUTO: NEGATIVE MG/DL
LEUKOCYTE ESTERASE UR QL STRIP.AUTO: ABNORMAL
LYMPHOCYTES # BLD: 2.9 K/UL (ref 0.9–3.6)
LYMPHOCYTES NFR BLD: 38 % (ref 21–52)
MCH RBC QN AUTO: 14.7 PG (ref 24–34)
MCHC RBC AUTO-ENTMCNC: 25.5 G/DL (ref 31–37)
MCV RBC AUTO: 57.8 FL (ref 74–97)
MONOCYTES # BLD: 0.4 K/UL (ref 0.05–1.2)
MONOCYTES NFR BLD: 5 % (ref 3–10)
NEUTS SEG # BLD: 3.9 K/UL (ref 1.8–8)
NEUTS SEG NFR BLD: 54 % (ref 40–73)
NITRITE UR QL STRIP.AUTO: NEGATIVE
P-R INTERVAL, ECG05: 180 MS
PH UR STRIP: 6.5 [PH] (ref 5–8)
PLATELET # BLD AUTO: 174 K/UL (ref 135–420)
PLATELET COMMENTS,PCOM: ABNORMAL
PMV BLD AUTO: 8.8 FL (ref 9.2–11.8)
POTASSIUM SERPL-SCNC: 3.8 MMOL/L (ref 3.5–5.5)
PROT UR STRIP-MCNC: NEGATIVE MG/DL
PROTHROMBIN TIME: 13.4 SEC (ref 11.5–15.2)
Q-T INTERVAL, ECG07: 412 MS
QRS DURATION, ECG06: 84 MS
QTC CALCULATION (BEZET), ECG08: 469 MS
RBC # BLD AUTO: 3.39 M/UL (ref 4.2–5.3)
RBC #/AREA URNS HPF: ABNORMAL /HPF (ref 0–5)
RBC MORPH BLD: ABNORMAL
SODIUM SERPL-SCNC: 138 MMOL/L (ref 136–145)
SP GR UR REFRACTOMETRY: 1.02 (ref 1–1.03)
SPECIMEN EXP DATE BLD: NORMAL
UROBILINOGEN UR QL STRIP.AUTO: 1 EU/DL (ref 0.2–1)
VENTRICULAR RATE, ECG03: 78 BPM
WBC # BLD AUTO: 7.5 K/UL (ref 4.6–13.2)
WBC URNS QL MICRO: ABNORMAL /HPF (ref 0–4)

## 2018-06-19 PROCEDURE — 85610 PROTHROMBIN TIME: CPT | Performed by: EMERGENCY MEDICINE

## 2018-06-19 PROCEDURE — 74011250636 HC RX REV CODE- 250/636: Performed by: INTERNAL MEDICINE

## 2018-06-19 PROCEDURE — 82728 ASSAY OF FERRITIN: CPT | Performed by: EMERGENCY MEDICINE

## 2018-06-19 PROCEDURE — 87804 INFLUENZA ASSAY W/OPTIC: CPT | Performed by: EMERGENCY MEDICINE

## 2018-06-19 PROCEDURE — 85025 COMPLETE CBC W/AUTO DIFF WBC: CPT | Performed by: EMERGENCY MEDICINE

## 2018-06-19 PROCEDURE — 74011250637 HC RX REV CODE- 250/637: Performed by: INTERNAL MEDICINE

## 2018-06-19 PROCEDURE — 36415 COLL VENOUS BLD VENIPUNCTURE: CPT | Performed by: HOSPITALIST

## 2018-06-19 PROCEDURE — 81001 URINALYSIS AUTO W/SCOPE: CPT | Performed by: EMERGENCY MEDICINE

## 2018-06-19 PROCEDURE — 86920 COMPATIBILITY TEST SPIN: CPT | Performed by: EMERGENCY MEDICINE

## 2018-06-19 PROCEDURE — 99218 HC RM OBSERVATION: CPT

## 2018-06-19 PROCEDURE — 85018 HEMOGLOBIN: CPT | Performed by: HOSPITALIST

## 2018-06-19 PROCEDURE — 80048 BASIC METABOLIC PNL TOTAL CA: CPT | Performed by: EMERGENCY MEDICINE

## 2018-06-19 PROCEDURE — 99285 EMERGENCY DEPT VISIT HI MDM: CPT

## 2018-06-19 PROCEDURE — 86900 BLOOD TYPING SEROLOGIC ABO: CPT | Performed by: EMERGENCY MEDICINE

## 2018-06-19 PROCEDURE — P9016 RBC LEUKOCYTES REDUCED: HCPCS | Performed by: EMERGENCY MEDICINE

## 2018-06-19 PROCEDURE — 77030021352 HC CBL LD SYS DISP COVD -B

## 2018-06-19 PROCEDURE — 93005 ELECTROCARDIOGRAM TRACING: CPT

## 2018-06-19 PROCEDURE — 36430 TRANSFUSION BLD/BLD COMPNT: CPT

## 2018-06-19 PROCEDURE — 71046 X-RAY EXAM CHEST 2 VIEWS: CPT

## 2018-06-19 RX ORDER — SODIUM CHLORIDE 9 MG/ML
75 INJECTION, SOLUTION INTRAVENOUS CONTINUOUS
Status: DISCONTINUED | OUTPATIENT
Start: 2018-06-19 | End: 2018-06-20 | Stop reason: HOSPADM

## 2018-06-19 RX ORDER — ONDANSETRON 4 MG/1
4 TABLET, ORALLY DISINTEGRATING ORAL
Status: DISCONTINUED | OUTPATIENT
Start: 2018-06-19 | End: 2018-06-20 | Stop reason: HOSPADM

## 2018-06-19 RX ORDER — SODIUM CHLORIDE 9 MG/ML
250 INJECTION, SOLUTION INTRAVENOUS AS NEEDED
Status: DISCONTINUED | OUTPATIENT
Start: 2018-06-19 | End: 2018-06-20 | Stop reason: HOSPADM

## 2018-06-19 RX ORDER — ENOXAPARIN SODIUM 100 MG/ML
40 INJECTION SUBCUTANEOUS EVERY 24 HOURS
Status: DISCONTINUED | OUTPATIENT
Start: 2018-06-19 | End: 2018-06-20 | Stop reason: HOSPADM

## 2018-06-19 RX ORDER — FERROUS SULFATE 300 MG/5ML
300 LIQUID (ML) ORAL
Status: DISCONTINUED | OUTPATIENT
Start: 2018-06-19 | End: 2018-06-20 | Stop reason: HOSPADM

## 2018-06-19 RX ADMIN — MINERAL SUPPLEMENT IRON 300 MG / 5 ML STRENGTH LIQUID 100 PER BOX UNFLAVORED 300 MG: at 20:25

## 2018-06-19 RX ADMIN — MINERAL SUPPLEMENT IRON 300 MG / 5 ML STRENGTH LIQUID 100 PER BOX UNFLAVORED 300 MG: at 13:05

## 2018-06-19 RX ADMIN — SODIUM CHLORIDE 75 ML/HR: 900 INJECTION, SOLUTION INTRAVENOUS at 06:40

## 2018-06-19 RX ADMIN — MINERAL SUPPLEMENT IRON 300 MG / 5 ML STRENGTH LIQUID 100 PER BOX UNFLAVORED 300 MG: at 10:00

## 2018-06-19 RX ADMIN — SODIUM CHLORIDE 75 ML/HR: 900 INJECTION, SOLUTION INTRAVENOUS at 06:39

## 2018-06-19 RX ADMIN — ACETAMINOPHEN 650 MG: 650 SOLUTION ORAL at 20:24

## 2018-06-19 NOTE — IP AVS SNAPSHOT
Summary of Care Report The Summary of Care report has been created to help improve care coordination. Users with access to [employer] or [address] (Web-based application) may access additional patient information including the Discharge Summary. If you are not currently a [address] user and need more information, please call the number listed below in the [place] [address] section and ask to be connected with Medical Records. Facility Information Name Address Phone [facility] [address] [facility] [address] [id number] [phone] Patient Information Patient Name Sex  Abe Sawyer ([id number]) Female 1965 Discharge Information Admitting Provider Service Area Unit Gabriele Quintanilla DO / [address] Card[facility] / [phone] Discharge Provider Discharge Date/Time Discharge Disposition Destination (none) 2018 Morning (Pending) AHR (none) Patient Language Language ENGLISH [13] Hospital Problems as of 2018  Reviewed: 2018 10:37 AM by Rose Arana MD  
  
  
  
 Class Noted - Resolved Last Modified POA Active Problems Symptomatic anemia  2018 - Present 2018 by Gabriele Quintanilla DO Unknown Entered by Gabriele Quintanilla DO Non-Hospital Problems as of 2018  Reviewed: 2018 10:37 AM by Rose Arana MD  
  
  
  
 Class Noted - Resolved Last Modified Active Problems Acute pharyngitis  3/28/2015 - Present 3/28/2015 by France Duran DO Entered by France Duran DO  
  Obesity (BMI 35.0-39.9 without comorbidity)  4/10/2017 - Present 4/10/2017 by Louisa Reyes Entered by Louisa Reyes  
  BMI 38.0-38.9,adult  2017 - Present 2017 by Louisa Reyes Entered by Louisa Reyes BMI 40.0-44.9, adult ([facility] [address]  5/19/2017 - Present 5/19/2017 by Isabela Delarosa Entered by Isabela Delarosa You are allergic to the following Allergen Reactions Latex Rash Penicillins Other (comments) Current Discharge Medication List  
  
START taking these medications Dose & Instructions Dispensing Information Comments * cetirizine 10 mg tablet Commonly known as:  ZyrTEC Dose:  10 mg Take 1 Tab by mouth daily. Quantity:  14 Tab Refills:  0  
   
 * cetirizine 10 mg tablet Commonly known as:  ZyrTEC Dose:  10 mg Take 1 Tab by mouth daily. Indications: allergy Quantity:  30 Tab Refills:  0  
   
 * Notice: This list has 2 medication(s) that are the same as other medications prescribed for you. Read the directions carefully, and ask your doctor or other care provider to review them with you. CONTINUE these medications which have CHANGED Dose & Instructions Dispensing Information Comments * ferrous sulfate 325 mg (65 mg iron) EC tablet Commonly known as:  IRON What changed:  Another medication with the same name was added. Make sure you understand how and when to take each. Dose:  325 mg Take 1 Tab by mouth two (2) times daily (with meals). Quantity:  60 Tab Refills:  1  
   
 * ferrous sulfate 300 mg (60 mg iron)/5 mL syrup What changed: You were already taking a medication with the same name, and this prescription was added. Make sure you understand how and when to take each. Dose:  300 mg Take 5 mL by mouth three (3) times daily (with meals). Quantity:  200 mL Refills:  0  
   
 * Notice: This list has 2 medication(s) that are the same as other medications prescribed for you. Read the directions carefully, and ask your doctor or other care provider to review them with you. CONTINUE these medications which have NOT CHANGED Dose & Instructions Dispensing Information Comments acetaminophen-codeine 300-30 mg per tablet Commonly known as:  TYLENOL-CODEINE #3 Dose:  1 Tab Take 1 Tab by mouth every six (6) hours as needed for Pain. Max Daily Amount: 4 Tabs. Quantity:  15 Tab Refills:  0  
   
 colchicine 0.6 mg tablet Dose:  0.6 mg Take 1 Tab by mouth daily. Take 3 times daily on day 1. Then twice daily on days two and three. Quantity:  7 Tab Refills:  0 HYDROcodone-acetaminophen 5-325 mg per tablet Commonly known as:  Jessie Abhishek Take 1-2 tablets PO every 4-6 hours as needed for pain control. If over the counter ibuprofen or acetaminophen was suggested, then only take the vicodin for pain not well controlled with the over the counter medication. Quantity:  6 Tab Refills:  0  
   
 * meloxicam 7.5 mg tablet Commonly known as:  MOBIC  
 TAKE ONE TABLET BY MOUTH TWICE DAILY WITH MEALS Quantity:  60 Tab Refills:  0  
   
 * meloxicam 7.5 mg tablet Commonly known as:  MOBIC Dose:  7.5 mg Take 1 Tab by mouth two (2) times a day. Quantity:  60 Tab Refills:  1  
   
 ondansetron 4 mg disintegrating tablet Commonly known as:  ZOFRAN ODT Dose:  4 mg Take 1 Tab by mouth every eight (8) hours as needed for Nausea. Quantity:  14 Tab Refills:  0 PriLOSEC 20 mg capsule Generic drug:  omeprazole Dose:  20 mg Take 20 mg by mouth daily. Refills:  0  
   
 * traMADol 50 mg tablet Commonly known as:  ULTRAM  
 Dose:  50 mg Take 50 mg by mouth every six (6) hours as needed for Pain. Refills:  0  
   
 * traMADol 50 mg tablet Commonly known as:  ULTRAM  
 Dose:  50 mg Take 1 Tab by mouth nightly as needed for Pain. Max Daily Amount: 50 mg. Indications: Pain Quantity:  14 Tab Refills:  2 [place] Regulation 18V SD79-95-76 dated March 15, 2017, states, \" acute pain\" medication shall not be prescribed by a medical provider with a short acting opioid for more than 7 days. * Notice: This list has 4 medication(s) that are the same as other medications prescribed for you. Read the directions carefully, and ask your doctor or other care provider to review them with you. Follow-up Information Follow up With Details Comments Contact [address] [address] [facility] [address] [phone] Discharge Instructions Admit Date:  6/19/18 Discharge Date:   6/20/18 Admission Diagnoses:    Symptomatic anemia - Hgb 5/Hct 19.6 (6/19/2018). Discharge Diagnoses:    Symptomatic anemia Activity: Activity as tolerated 
  
Diet: Cardiac Diet 
  
Wound Care: None needed 
  
Follow-up:  
  
Patient to follow-up with PCP within 1 week to discuss her recent hospitalization. Patient to arrange.  
  
 
 
  
Anemia: Care Instructions Your Care Instructions Anemia is a low level of red blood cells, which carry oxygen throughout your body. Many things can cause anemia. Lack of iron is one of the most common causes. Your body needs iron to make hemoglobin, a substance in red blood cells that carries oxygen from the lungs to your body's cells. Without enough iron, the body produces fewer and smaller red blood cells. As a result, your body's cells do not get enough oxygen, and you feel tired and weak. And you may have trouble concentrating. Bleeding is the most common cause of a lack of iron. You may have heavy menstrual bleeding or bleeding caused by conditions such as ulcers, hemorrhoids, or cancer. Regular use of aspirin or other anti-inflammatory medicines (such as ibuprofen) also can cause bleeding in some people. A lack of iron in your diet also can cause anemia, especially at times when the body needs more iron, such as during pregnancy, infancy, and the teen years. Your doctor may have prescribed iron pills.  It may take several months of treatment for your iron levels to return to normal. Your doctor also may suggest that you eat foods that are rich in iron, such as meat and beans. There are many other causes of anemia. It is not always due to a lack of iron. Finding the specific cause of your anemia will help your doctor find the right treatment for you. Follow-up care is a key part of your treatment and safety. Be sure to make and go to all appointments, and call your doctor if you are having problems. It's also a good idea to know your test results and keep a list of the medicines you take. How can you care for yourself at home? · Take your medicines exactly as prescribed. Call your doctor if you think you are having a problem with your medicine. · If your doctor recommends iron pills, take them as directed: ¨ Try to take the pills on an empty stomach about 1 hour before or 2 hours after meals. But you may need to take iron with food to avoid an upset stomach. ¨ Do not take antacids or drink milk or caffeine drinks (such as coffee, tea, or cola) at the same time or within 2 hours of the time that you take your iron. They can make it hard for your body to absorb the iron. ¨ Vitamin C (from food or supplements) helps your body absorb iron. Try taking iron pills with a glass of orange juice or some other food that is high in vitamin C, such as citrus fruits. ¨ Iron pills may cause stomach problems, such as heartburn, nausea, diarrhea, constipation, and cramps. Be sure to drink plenty of fluids, and include fruits, vegetables, and fiber in your diet each day. Iron pills often make your bowel movements dark or green. ¨ If you forget to take an iron pill, do not take a double dose of iron the next time you take a pill. ¨ Keep iron pills out of the reach of small children. An overdose of iron can be very dangerous. · Follow your doctor's advice about eating iron-rich foods. These include red meat, shellfish, poultry, eggs, beans, raisins, whole-grain bread, and leafy green vegetables. · Steam vegetables to help them keep their iron content. When should you call for help? Call 911 anytime you think you may need emergency care. For example, call if: 
? · You have symptoms of a heart attack. These may include: ¨ Chest pain or pressure, or a strange feeling in the chest. 
¨ Sweating. ¨ Shortness of breath. ¨ Nausea or vomiting. ¨ Pain, pressure, or a strange feeling in the back, neck, jaw, or upper belly or in one or both shoulders or arms. ¨ Lightheadedness or sudden weakness. ¨ A fast or irregular heartbeat. After you call 911, the  may tell you to chew 1 adult-strength or 2 to 4 low-dose aspirin. Wait for an ambulance. Do not try to drive yourself. ? · You passed out (lost consciousness). ?Call your doctor now or seek immediate medical care if: 
? · You have new or increased shortness of breath. ? · You are dizzy or lightheaded, or you feel like you may faint. ? · Your fatigue and weakness continue or get worse. ? · You have any abnormal bleeding, such as: 
¨ Nosebleeds. ¨ Vaginal bleeding that is different (heavier, more frequent, at a different time of the month) than what you are used to. ¨ Bloody or black stools, or rectal bleeding. ¨ Bloody or pink urine. ? Watch closely for changes in your health, and be sure to contact your doctor if: 
? · You do not get better as expected. Where can you learn more? Go to http://macy-ana m.info/. Enter R301 in the search box to learn more about \"Anemia: Care Instructions. \" Current as of: October 13, 2016 Content Version: 11.4 © 3000-0430 [employer]. Care instructions adapted under license by [employer] (which disclaims liability or warranty for this information). If you have questions about a medical condition or this instruction, always ask your healthcare professional. [address] any warranty or liability for your use of this information. Learning About Blood Transfusions What is a blood transfusion? Blood transfusion is a medical treatment to replace the blood or parts of blood that your body has lost. The blood goes through a tube from a bag to an intravenous (IV) catheter and into your vein. You may need a blood transfusion after losing blood from an injury, a major surgery, an illness that causes bleeding, or an illness that destroys blood cells. Transfusions are also used to give you the parts of blood-such as platelets, plasma, or substances that cause clotting-that your body needs to fight an illness or stop bleeding. How is a blood transfusion done? Before you receive a blood transfusion, your blood is tested to find out what your blood type is. Blood or blood parts that are a match with your blood type are ordered by your doctor. Blood is typed as A, B, AB, or O. It is also typed as Rh-positive or Rh-negative. Your blood is also screened to look for antibodies that might react with the blood that is given to you. The blood you are getting is checked and rechecked to make sure that it's the right type for you. A sample of your blood is mixed with a sample of the blood you will receive to check for problems. Before actually giving you the transfusion, a doctor and nurses will look at the label on the package of blood and compare it to your hospital ID bracelet and medical records. The transfusion begins only when all agree that this is the correct blood and that you are the correct person to receive it. To receive the transfusion, you will have an intravenous (IV) catheter inserted into a vein. A tube connects the catheter to the bag containing the blood, which is placed higher than your body. The blood then flows slowly into your vein. A doctor or nurse will check you several times during the transfusion to watch for a reaction or other problems. What are the possible risks? Blood transfusions have many benefits and are often life-saving. But they also have a few risks. Possible risks include: 
· Your body's reaction to receiving new blood. This may include: ¨ Fever. ¨ Allergic reactions. ¨ Breathing problems. · An infection from the blood. This risk is small because of the strict rules placed on handling and storing blood. Getting a viral infection, such as HIV or hepatitis B or C, through blood transfusions has become very rare. The U.S. Food and Drug Administration (FDA) enforces strict guidelines on the collection, testing, storage, and use of blood. · Getting the wrong blood type by accident. Severe reactions, which can be life-threatening, are very rare. What can you expect after a blood transfusion? Here are some things you can do at home to help prevent infection at the transfusion site: 
· Wash the area daily with warm, soapy water, and pat it dry. Don't use hydrogen peroxide or alcohol, which can slow healing. You may cover the area with a gauze bandage if it weeps or rubs against clothing. Change the bandage every day. · Keep the area clean and dry. When should you call for help? Call 911 anytime you think you may need emergency care. For example, call if: 
· You have severe trouble breathing. Call your doctor now or seek immediate medical care if: 
· You have a fever. · You feel weaker or more tired than usual. 
· You have a yellow tint to your skin or the whites of your eyes. Watch closely for changes in your health, and be sure to contact your doctor if you have any problems. Follow-up care is a key part of your treatment and safety. Be sure to make and go to all appointments, and call your doctor if you are having problems. It's also a good idea to know your test results and keep a list of the medicines you take. Where can you learn more? Go to http://macy-ana m.info/. Enter V588 in the search box to learn more about \"Learning About Blood Transfusions. \" Current as of: 2016 Content Version: 11.4 © 7913-6053 [employer], [employer]. Care instructions adapted under license by [employer] (which disclaims liability or warranty for this information). If you have questions about a medical condition or this instruction, always ask your healthcare professional. [address] any warranty or liability for your use of this information. [employer] Activation Thank you for requesting access to [employer]. Please follow the instructions below to securely access and download your online medical record. [employer] allows you to send messages to your doctor, view your test results, renew your prescriptions, schedule appointments, and more. How Do I Sign Up? 1. In your internet browser, go to [email or web address] 
2. Click on the First Time User? Click Here link in the Sign In box. You will be redirect to the New Member Sign Up page. 3. Enter your [employer] Access Code exactly as it appears below. You will not need to use this code after youve completed the sign-up process. If you do not sign up before the expiration date, you must request a new code. [employer] Access Code: [email or web address] Expires: 2018  3:00 PM (This is the date your [employer] access code will ) 4. Enter the last four digits of your Social Security Number (xxxx) and Date of Birth (mm/dd/yyyy) as indicated and click Submit. You will be taken to the next sign-up page. 5. Create a [employer] ID. This will be your [employer] login ID and cannot be changed, so think of one that is secure and easy to remember. 6. Create a [employer] password. You can change your password at any time. 7. Enter your Password Reset Question and Answer. This can be used at a later time if you forget your password. 8. Enter your e-mail address.  You will receive e-mail notification when new information is available in [email or web address]. 9. Click Sign Up. You can now view and download portions of your medical record. 10. Click the Download Summary menu link to download a portable copy of your medical information. Additional Information If you have questions, please visit the Frequently Asked Questions section of the [employer] website at [email or web address] Remember, [employer] is NOT to be used for urgent needs. For medical emergencies, dial 911. Patient armband removed and shredded Chart Review Routing History No Routing History on File

## 2018-06-19 NOTE — IP AVS SNAPSHOT
[address] [address] 
[phone] Patient: Will Mckeon MRN: [id number] :1965 About your hospitalization You were admitted on:  2018 You last received care in the:  [address] [place] [address] You were discharged on:  2018 Why you were hospitalized Your primary diagnosis was:  Not on File Your diagnoses also included:  Symptomatic Anemia Follow-up Information Follow up With Details Comments Contact Info CHRISTINE Choudhary [address] [id number] [facility] [address] [phone] Your Scheduled Appointments 2018 10:15 AM EDT Follow Up with Rosey Martinez PA-C  
[facility] - [address], [address] [facility] [address]  
[phone] Discharge Orders None A check ryan indicates which time of day the medication should be taken. My Medications START taking these medications Instructions Each Dose to Equal  
 Morning Noon Evening Bedtime * cetirizine 10 mg tablet Commonly known as:  ZyrTEC Your last dose was: Your next dose is: Take 1 Tab by mouth daily. 10 mg  
    
   
   
   
  
 * cetirizine 10 mg tablet Commonly known as:  ZyrTEC Your last dose was: Your next dose is: Take 1 Tab by mouth daily. Indications: allergy 10 mg  
    
   
   
   
  
 * Notice: This list has 2 medication(s) that are the same as other medications prescribed for you. Read the directions carefully, and ask your doctor or other care provider to review them with you. CHANGE how you take these medications Instructions Each Dose to Equal  
 Morning Noon Evening Bedtime * ferrous sulfate 325 mg (65 mg iron) EC tablet Commonly known as:  IRON  
 What changed:  Another medication with the same name was added. Make sure you understand how and when to take each. Your last dose was: Your next dose is: Take 1 Tab by mouth two (2) times daily (with meals). 325 mg  
    
   
   
   
  
 * ferrous sulfate 300 mg (60 mg iron)/5 mL syrup What changed: You were already taking a medication with the same name, and this prescription was added. Make sure you understand how and when to take each. Your last dose was: Your next dose is: Take 5 mL by mouth three (3) times daily (with meals). 300 mg * Notice: This list has 2 medication(s) that are the same as other medications prescribed for you. Read the directions carefully, and ask your doctor or other care provider to review them with you. CONTINUE taking these medications Instructions Each Dose to Equal  
 Morning Noon Evening Bedtime  
 acetaminophen-codeine 300-30 mg per tablet Commonly known as:  TYLENOL-CODEINE #3 Your last dose was: Your next dose is: Take 1 Tab by mouth every six (6) hours as needed for Pain. Max Daily Amount: 4 Tabs. 1 Tab  
    
   
   
   
  
 colchicine 0.6 mg tablet Your last dose was: Your next dose is: Take 1 Tab by mouth daily. Take 3 times daily on day 1. Then twice daily on days two and three. 0.6 mg  
    
   
   
   
  
 HYDROcodone-acetaminophen 5-325 mg per tablet Commonly known as:  Enrico Israel Your last dose was: Your next dose is: Take 1-2 tablets PO every 4-6 hours as needed for pain control. If over the counter ibuprofen or acetaminophen was suggested, then only take the vicodin for pain not well controlled with the over the counter medication. * meloxicam 7.5 mg tablet Commonly known as:  MOBIC Your last dose was: Your next dose is: TAKE ONE TABLET BY MOUTH TWICE DAILY WITH MEALS  
     
   
   
   
  
 * meloxicam 7.5 mg tablet Commonly known as:  MOBIC Your last dose was: Your next dose is: Take 1 Tab by mouth two (2) times a day. 7.5 mg  
    
   
   
   
  
 ondansetron 4 mg disintegrating tablet Commonly known as:  ZOFRAN ODT Your last dose was: Your next dose is: Take 1 Tab by mouth every eight (8) hours as needed for Nausea. 4 mg PriLOSEC 20 mg capsule Generic drug:  omeprazole Your last dose was: Your next dose is: Take 20 mg by mouth daily. 20 mg  
    
   
   
   
  
 * traMADol 50 mg tablet Commonly known as:  ULTRAM  
   
Your last dose was: Your next dose is: Take 50 mg by mouth every six (6) hours as needed for Pain. 50 mg  
    
   
   
   
  
 * traMADol 50 mg tablet Commonly known as:  ULTRAM  
   
Your last dose was: Your next dose is: Take 1 Tab by mouth nightly as needed for Pain. Max Daily Amount: 50 mg. Indications: Pain 50 mg  
    
   
   
   
  
 * Notice: This list has 4 medication(s) that are the same as other medications prescribed for you. Read the directions carefully, and ask your doctor or other care provider to review them with you. Where to Get Your Medications These medications were sent to [address] [place] [address]. [address] [place] [address].[address] [address] Phone:  [phone]  
  cetirizine 10 mg tablet Information on where to get these meds will be given to you by the nurse or doctor. ! Ask your nurse or doctor about these medications  
  cetirizine 10 mg tablet  
 ferrous sulfate 300 mg (60 mg iron)/5 mL syrup Opioid Education  Prescription Opioids: What You Need to Know: 
 
  
Diet: Cardiac Diet 
  
Wound Care: None needed 
  
Follow-up:  
  
Patient to follow-up with PCP within 1 week to discuss her recent hospitalization. Patient to arrange.  
  
 
 
  
Anemia: Care Instructions Your Care Instructions Anemia is a low level of red blood cells, which carry oxygen throughout your body. Many things can cause anemia. Lack of iron is one of the most common causes. Your body needs iron to make hemoglobin, a substance in red blood cells that carries oxygen from the lungs to your body's cells. Without enough iron, the body produces fewer and smaller red blood cells. As a result, your body's cells do not get enough oxygen, and you feel tired and weak. And you may have trouble concentrating. Bleeding is the most common cause of a lack of iron. You may have heavy menstrual bleeding or bleeding caused by conditions such as ulcers, hemorrhoids, or cancer. Regular use of aspirin or other anti-inflammatory medicines (such as ibuprofen) also can cause bleeding in some people. A lack of iron in your diet also can cause anemia, especially at times when the body needs more iron, such as during pregnancy, infancy, and the teen years. Your doctor may have prescribed iron pills. It may take several months of treatment for your iron levels to return to normal. Your doctor also may suggest that you eat foods that are rich in iron, such as meat and beans. There are many other causes of anemia. It is not always due to a lack of iron. Finding the specific cause of your anemia will help your doctor find the right treatment for you. Follow-up care is a key part of your treatment and safety.  Be sure to make and go to all appointments, and call your doctor if you are having problems. It's also a good idea to know your test results and keep a list of the medicines you take. How can you care for yourself at home? · Take your medicines exactly as prescribed. Call your doctor if you think you are having a problem with your medicine. · If your doctor recommends iron pills, take them as directed: ¨ Try to take the pills on an empty stomach about 1 hour before or 2 hours after meals. But you may need to take iron with food to avoid an upset stomach. ¨ Do not take antacids or drink milk or caffeine drinks (such as coffee, tea, or cola) at the same time or within 2 hours of the time that you take your iron. They can make it hard for your body to absorb the iron. ¨ Vitamin C (from food or supplements) helps your body absorb iron. Try taking iron pills with a glass of orange juice or some other food that is high in vitamin C, such as citrus fruits. ¨ Iron pills may cause stomach problems, such as heartburn, nausea, diarrhea, constipation, and cramps. Be sure to drink plenty of fluids, and include fruits, vegetables, and fiber in your diet each day. Iron pills often make your bowel movements dark or green. ¨ If you forget to take an iron pill, do not take a double dose of iron the next time you take a pill. ¨ Keep iron pills out of the reach of small children. An overdose of iron can be very dangerous. · Follow your doctor's advice about eating iron-rich foods. These include red meat, shellfish, poultry, eggs, beans, raisins, whole-grain bread, and leafy green vegetables. · Steam vegetables to help them keep their iron content. When should you call for help? Call 911 anytime you think you may need emergency care. For example, call if: 
? · You have symptoms of a heart attack. These may include: ¨ Chest pain or pressure, or a strange feeling in the chest. 
¨ Sweating. ¨ Shortness of breath. ¨ Nausea or vomiting. ¨ Pain, pressure, or a strange feeling in the back, neck, jaw, or upper belly or in one or both shoulders or arms. ¨ Lightheadedness or sudden weakness. ¨ A fast or irregular heartbeat. After you call 911, the  may tell you to chew 1 adult-strength or 2 to 4 low-dose aspirin. Wait for an ambulance. Do not try to drive yourself. ? · You passed out (lost consciousness). ?Call your doctor now or seek immediate medical care if: 
? · You have new or increased shortness of breath. ? · You are dizzy or lightheaded, or you feel like you may faint. ? · Your fatigue and weakness continue or get worse. ? · You have any abnormal bleeding, such as: 
¨ Nosebleeds. ¨ Vaginal bleeding that is different (heavier, more frequent, at a different time of the month) than what you are used to. ¨ Bloody or black stools, or rectal bleeding. ¨ Bloody or pink urine. ? Watch closely for changes in your health, and be sure to contact your doctor if: 
? · You do not get better as expected. Where can you learn more? Go to http://macy-ana m.info/. Enter R301 in the search box to learn more about \"Anemia: Care Instructions. \" Current as of: October 13, 2016 Content Version: 11.4 © 4100-7160 UNATION. Care instructions adapted under license by [employer] (which disclaims liability or warranty for this information). If you have questions about a medical condition or this instruction, always ask your healthcare professional. [place] [address] any warranty or liability for your use of this information. Learning About Blood Transfusions What is a blood transfusion? Blood transfusion is a medical treatment to replace the blood or parts of blood that your body has lost. The blood goes through a tube from a bag to an intravenous (IV) catheter and into your vein.  
You may need a blood transfusion after losing blood from an injury, a major surgery, an illness that causes bleeding, or an illness that destroys blood cells. Transfusions are also used to give you the parts of blood-such as platelets, plasma, or substances that cause clotting-that your body needs to fight an illness or stop bleeding. How is a blood transfusion done? Before you receive a blood transfusion, your blood is tested to find out what your blood type is. Blood or blood parts that are a match with your blood type are ordered by your doctor. Blood is typed as A, B, AB, or O. It is also typed as Rh-positive or Rh-negative. Your blood is also screened to look for antibodies that might react with the blood that is given to you. The blood you are getting is checked and rechecked to make sure that it's the right type for you. A sample of your blood is mixed with a sample of the blood you will receive to check for problems. Before actually giving you the transfusion, a doctor and nurses will look at the label on the package of blood and compare it to your hospital ID bracelet and medical records. The transfusion begins only when all agree that this is the correct blood and that you are the correct person to receive it. To receive the transfusion, you will have an intravenous (IV) catheter inserted into a vein. A tube connects the catheter to the bag containing the blood, which is placed higher than your body. The blood then flows slowly into your vein. A doctor or nurse will check you several times during the transfusion to watch for a reaction or other problems. What are the possible risks? Blood transfusions have many benefits and are often life-saving. But they also have a few risks. Possible risks include: 
· Your body's reaction to receiving new blood. This may include: ¨ Fever. ¨ Allergic reactions. ¨ Breathing problems. · An infection from the blood. This risk is small because of the strict rules placed on handling and storing blood.  Getting a viral infection, such as HIV or hepatitis B or C, through blood transfusions has become very rare. The U.S. Food and Drug Administration (FDA) enforces strict guidelines on the collection, testing, storage, and use of blood. · Getting the wrong blood type by accident. Severe reactions, which can be life-threatening, are very rare. What can you expect after a blood transfusion? Here are some things you can do at home to help prevent infection at the transfusion site: 
· Wash the area daily with warm, soapy water, and pat it dry. Don't use hydrogen peroxide or alcohol, which can slow healing. You may cover the area with a gauze bandage if it weeps or rubs against clothing. Change the bandage every day. · Keep the area clean and dry. When should you call for help? Call 911 anytime you think you may need emergency care. For example, call if: 
· You have severe trouble breathing. Call your doctor now or seek immediate medical care if: 
· You have a fever. · You feel weaker or more tired than usual. 
· You have a yellow tint to your skin or the whites of your eyes. Watch closely for changes in your health, and be sure to contact your doctor if you have any problems. Follow-up care is a key part of your treatment and safety. Be sure to make and go to all appointments, and call your doctor if you are having problems. It's also a good idea to know your test results and keep a list of the medicines you take. Where can you learn more? Go to http://macy-ana m.info/. Enter V588 in the search box to learn more about \"Learning About Blood Transfusions. \" Current as of: October 13, 2016 Content Version: 11.4 © 9592-4788 Perfect Price. Care instructions adapted under license by [employer] (which disclaims liability or warranty for this information).  If you have questions about a medical condition or this instruction, always ask your healthcare professional. [employer] disclaims any warranty or liability for your use of this information. MyChart Activation Thank you for requesting access to [employer]. Please follow the instructions below to securely access and download your online medical record. [employer] allows you to send messages to your doctor, view your test results, renew your prescriptions, schedule appointments, and more. How Do I Sign Up? 1. In your internet browser, go to [email or web address] 
2. Click on the First Time User? Click Here link in the Sign In box. You will be redirect to the New Member Sign Up page. 3. Enter your [employer] Access Code exactly as it appears below. You will not need to use this code after youve completed the sign-up process. If you do not sign up before the expiration date, you must request a new code. [employer] Access Code: [email or web address] Expires: 2018  3:00 PM (This is the date your [employer] access code will ) 4. Enter the last four digits of your Social Security Number (xxxx) and Date of Birth (mm/dd/yyyy) as indicated and click Submit. You will be taken to the next sign-up page. 5. Create a [employer] ID. This will be your [employer] login ID and cannot be changed, so think of one that is secure and easy to remember. 6. Create a [employer] password. You can change your password at any time. 7. Enter your Password Reset Question and Answer. This can be used at a later time if you forget your password. 8. Enter your e-mail address. You will receive e-mail notification when new information is available in [address] 9. Click Sign Up. You can now view and download portions of your medical record. 10. Click the Download Summary menu link to download a portable copy of your medical information. Additional Information If you have questions, please visit the Frequently Asked Questions section of the [employer] website at [email or web address] Remember, [employer] is NOT to be used for urgent needs. For medical emergencies, dial 911. Patient armband removed and shredded Part[employer] Announcement We are excited to announce that we are making your provider's discharge notes available to you in [employer]. You will see these notes when they are completed and signed by the physician that discharged you from your recent hospital stay. If you have any questions or concerns about any information you see in [employer], please call the Health Information Department where you were seen or reach out to your Primary Care Provider for more information about your plan of care. Introducing [facility]! [facility] introduces [employer] patient portal. Now you can access parts of your medical record, email your doctor's office, and request medication refills online. 1. In your internet browser, go to [email or web address] 2. Click on the First Time User? Click Here link in the Sign In box. You will see the New Member Sign Up page. 3. Enter your [employer] Access Code exactly as it appears below. You will not need to use this code after youve completed the sign-up process. If you do not sign up before the expiration date, you must request a new code. · [employer] Access Code: [email or web address] Expires: 8/21/2018  3:00 PM 
 
4. Enter the last four digits of your Social Security Number (xxxx) and Date of Birth (mm/dd/yyyy) as indicated and click Submit. You will be taken to the next sign-up page. 5. Create a [employer] ID. This will be your [employer] login ID and cannot be changed, so think of one that is secure and easy to remember. 6. Create a [employer] password. You can change your password at any time. 7. Enter your Password Reset Question and Answer. This can be used at a later time if you forget your password. 8. Enter your e-mail address. You will receive e-mail notification when new information is available in [address] 9. Click Sign Up. You can now view and download portions of your medical record. 10. Click the Download Summary menu link to download a portable copy of your medical information. If you have questions, please visit the Frequently Asked Questions section of the [employer] website. Remember, [employer] is NOT to be used for urgent needs. For medical emergencies, dial 911. Now available from your iPhone and Android! Introducing Sergio West As a Thersia Sujata patient, I wanted to make you aware of our electronic visit tool called Sergio West. [facility] 24/7 allows you to connect within minutes with a medical provider 24 hours a day, seven days a week via a mobile device or tablet or logging into a secure website from your computer. You can access Sergio West from anywhere in the [place]. A virtual visit might be right for you when you have a simple condition and feel like you just dont want to get out of bed, or cant get away from work for an appointment, when your regular Thersia Sujata provider is not available (evenings, weekends or holidays), or when youre out of town and need minor care. Electronic visits cost only $49 and if the Thersia Sujata 24/7 provider determines a prescription is needed to treat your condition, one can be electronically transmitted to a nearby pharmacy*. Please take a moment to enroll today if you have not already done so. The enrollment process is free and takes just a few minutes. To enroll, please download the Thersia Sujata 24/7 joy to your tablet or phone, or visit [email or web address] to enroll on your computer. And, as an [address] [place] [address] patient with a [employer] account, the results of your visits will be scanned into your electronic medical record and your primary care provider will be able to view the scanned results.    
We urge you to continue to see your regular Thersia Sujata provider for your ongoing medical care. And while your primary care provider may not be the one available when you seek a Sergio Mayenmanuelfin virtual visit, the peace of mind you get from getting a real diagnosis real time can be priceless. For more information on Sergio West, view our Frequently Asked Questions (FAQs) at [email or web address]. Sincerely, 
 
Jag Mckoy MD 
Chief Medical Officer Swapna Lopez *:  certain medications cannot be prescribed via Sergio Mayenmanuelpenny Unresulted tests-please follow up with your PCP on these results Procedure/Test Authorizing Provider CBC W/O DIFF Williemae Gottron, DO  
 CBC WITH AUTOMATED DIFF Yeny Florse MD  
 FERRITIN Ria Ortiz MD  
 HGB & HCT Yeny Flores MD  
 METABOLIC PANEL, BASIC Williemae Gottron, DO  
  
Providers Seen During Your Hospitalization Provider Specialty Primary office phone Ria Ortiz MD Emergency Medicine [phone] Yeny Flores MD [facility] [phone] Williemae Gottron, [address] [facility] Internal Medicine [phone] Your Primary Care Physician (PCP) Primary Care Physician Office Phone Office Fax [facility] [phone] You are allergic to the following Allergen Reactions Latex Rash Penicillins Other (comments) Recent Documentation Height Weight Breastfeeding? BMI OB Status Smoking Status 1.473 m 94.8 kg No 43.68 kg/m2 Premenopausal Current Every Day Smoker Emergency Contacts Name Discharge Info Relation Home Work Mobile Senia Queen DISCHARGE CAREGIVER [3] Other Relative [6] [phone] Birgit CAREGIVER [3] Child [2] [phone] Patient Belongings  The following personal items are in your possession at time of discharge: 
  Dental Appliances: None  Visual Aid: Glasses (glasses for reading) Home Medications: None   Jewelry: Body Piercing, Necklace, Other (comment) (nose ring, yellow necklace with pendant ( cross))  Clothing: Footwear, Pants, Shirt, Undergarments, With patient    Other Valuables: Cell Phone, Cigarettes, Emergency Alert Device, Eyeglasses, Keys, Money (comment), Purse, With patient (cigarettes and lighter daughter took home)  Personal Items Sent to Safe: n/a Discharge Instructions Attachments/References IRON SUPPLEMENTS (BY MOUTH) (ENGLISH) IRON SUPPLEMENTS: GENERAL INFO (ENGLISH) IRON-RICH DIET (ENGLISH) Patient Handouts Iron Supplements (By mouth) Treats low blood iron or anemia by helping your body make red blood cells. Brand Name(s): Beef/Iron/Wine, Bifera, BiferaRx, Corvite FE, Duofer, EZFE 200, Enfamil Kali-In-Sol, [facility] Iron Tablets, Fe-20, Femcon Fe, Femiron, Feosol, Kali-Iron, Cindi haute, [facility] There may be other brand names for this medicine. When This Medicine Should Not Be Used: You should not use this medicine if you have had an allergic reaction to iron supplements, or if you have a condition called hemachromatosis (iron overload disease) or hemosiderosis (iron in the lungs), or any type of anemia that is not caused by iron deficiency. How to Use This Medicine:  
Liquid Filled Capsule, Coated Tablet, Tablet, Capsule, Chewable Tablet, Liquid, Long Acting Capsule, Long Acting Tablet · Your doctor will tell you how much of this medicine to take and how often. Do not take more medicine or take it more often than your doctor tells you to. Carefully follow your doctor's instructions about any special diet. · It is best to take this medicine on an empty stomach, 1 hour before or 2 hours after a meal. Take the medicine with a full glass or water or fruit juice. If the medicine upsets your stomach, you may take it with food. · The chewable tablet must be chewed or crushed before you swallow it. · Measure the oral liquid medicine with a marked measuring spoon or medicine cup. · The oral liquid may stain your teeth. These stains can be prevented by mixing the medicine with water or other liquids (such as fruit juice, tomato juice), and drinking the medicine with a straw. To remove any iron stains, brush your teeth with baking soda or peroxide. If a dose is missed: · If you miss a dose or forget to take your medicine, take it as soon as you can. If it is almost time for your next dose, wait until then to take the medicine and skip the missed dose. · Do not use extra medicine to make up for a missed dose. How to Store and Dispose of This Medicine: · Store the medicine at room temperature, away from heat, moisture, and direct light. · Keep all medicine away from children, and never share your medicine with anyone. Drugs and Foods to Avoid: Ask your doctor or pharmacist before using any other medicine, including over-the-counter medicines, vitamins, and herbal products. · Do not take iron supplements by mouth if you are also receiving iron injections. · Make sure your doctor knows if you are also using phenytoin (Dilantin®), acetohydroxamic acid (Lithostat®), or antibiotics such as demeclocycline, doxycycline (Vibramycin®), Cipro®, Levaquin®, minocycline, moxifloxacin (Avelox®), Tequin®, or tetracycline. · Tell your doctor if you are using antacids (such as Maalox® or Mylanta®). · Avoid the following foods, or eat them in small amounts at least 1 hour before or 2 hours after taking your iron: eggs, milk, cheese, yogurt, tea or coffee, whole-grain cereals, and breads. Warnings While Using This Medicine: · Make sure your doctor knows if you are pregnant or breastfeeding, or if you have stomach or intestinal problems, an active infection, diabetes, porphyria, or other medical problems.  
· Make sure any doctor or dentist who treats you knows that you are using this medicine. Iron may affect the results of certain medical tests. · Iron can cause your stools to be darker in color. This is normal and is not a cause for concern. Possible Side Effects While Using This Medicine:  
Call your doctor right away if you notice any of these side effects: · Bloody diarrhea · Bluish-colored lips, hands, or fingernails · Chest pain · Fever · Pale or clammy skin · Severe or continuing stomach cramps, vomiting (with or without blood) · Shallow breathing, weakness, weak but fast heartbeat If you notice these less serious side effects, talk with your doctor: · Constipation, diarrhea, nausea · Dark-colored urine · Leg cramps If you notice other side effects that you think are caused by this medicine, tell your doctor. Call your doctor for medical advice about side effects. You may report side effects to FDA at 4-705-FDA-8809 © 2017 [address] Information is for End User's use only and may not be sold, redistributed or otherwise used for commercial purposes. The above information is an  only. It is not intended as medical advice for individual conditions or treatments. Talk to your doctor, nurse or pharmacist before following any medical regimen to see if it is safe and effective for you. Learning About Iron Supplements Introduction People take iron supplements when their bodies do not have enough iron. Your body uses iron to make hemoglobin. Hemoglobin is part of red blood cells. It carries oxygen through the body. Without enough oxygen, you may feel weak, dizzy, and short of breath. You may tire easily. You also may feel grumpy, have headaches, and have trouble concentrating. Most people begin to feel normal after a few weeks of taking iron pills. But you need to take the pills for several months to build up the iron supply in your body. This can take up to 6 months. Examples · Ferrous fumarate (Ferrets, Hemocyte, Ircon) · Ferrous gluconate [facility]) · Ferrous sulfate (Feosol, Kali-Gen-Sol, Kali-in-Sol) You can buy iron supplements without a prescription. Your doctor will give you directions on how much to take and for how long. Your doctor will also tell you whether you need any testing for iron levels. Possible side effects Common side effects may include: · Stomachache. · Nausea. · Constipation. · Black stools. You may have other side effects or reactions not listed here. Check the information that comes with your medicine. What to know about taking this medicine · Take your medicines exactly as prescribed. Call your doctor if you think you are having a problem with your medicine. ¨ Try to take the pills on an empty stomach about 1 hour before or 2 hours after meals. But you may need to take the iron with food to avoid a stomachache. ¨ Do not take antacids or drink milk or caffeine drinks (such as coffee, tea, or cola) at the same time or within 2 hours of the time that you take your iron pills. They can keep your body from absorbing the iron well. ¨ Vitamin C helps your body absorb iron. You may want to take iron pills with a glass of orange juice or some other food high in vitamin C. 
¨ Iron pills may cause stomach problems, such as heartburn, nausea, diarrhea, constipation, and cramps. Be sure to drink plenty of fluids and eat fruits, vegetables, and fiber each day. ¨ Iron pills can change the color of your stool to a greenish or grayish black. This is normal. But internal bleeding also can cause dark stool. So be sure to tell your doctor about any color changes. ¨ Call your doctor if you think you are having a problem with your iron pills. Even after you start feeling better, it will take several months for your body to build up its supply of iron. ¨ If you miss taking a pill on time, do not take a double dose of iron. Take your next dose at the scheduled time. · Liquid forms of iron can stain your teeth. You can mix a dose of liquid iron in water, fruit juice, or tomato juice. Drink it with a straw so that it does not get on your teeth. · Check with your doctor or pharmacist before you use any other medicines, including over-the-counter medicines. Make sure your doctor knows all of the medicines, vitamins, herbal products, and supplements you take. Taking some medicines together can cause problems. · Keep iron tablets out of the reach of small children. Too much iron can be very dangerous. Follow-up care is a key part of your treatment and safety. Be sure to make and go to all appointments, and call your doctor if you are having problems. It's also a good idea to know your test results and keep a list of the medicines you take. Where can you learn more? Go to http://amcyASSET4ana m.info/. Burak Paz in the search box to learn more about \"Learning About Iron Supplements. \" Current as of: May 12, 2017 Content Version: 11.4 © 0959-2360 [employer]. Care instructions adapted under license by [employer] (which disclaims liability or warranty for this information). If you have questions about a medical condition or this instruction, always ask your healthcare professional. [address] any warranty or liability for your use of this information. Iron-Rich Diet: Care Instructions Your Care Instructions Your body needs iron to make hemoglobin. Hemoglobin is a substance in red blood cells that carries oxygen from the lungs to cells all through your body. If you do not get enough iron, your body makes fewer and smaller red blood cells. As a result, your body's cells may not get enough oxygen. Adult men need 8 milligrams of iron a day; adult women need 18 milligrams of iron a day. After menopause, women need 8 milligrams of iron a day.  A pregnant woman needs 27 milligrams of iron a day. Infants and young children have higher iron needs relative to their size than other age groups. People who have lost blood because of ulcers or heavy menstrual periods may become very low in iron and may develop anemia. Most people can get the iron their bodies need by eating enough of certain iron-rich foods. Your doctor may recommend that you take an iron supplement along with eating an iron-rich diet. Follow-up care is a key part of your treatment and safety. Be sure to make and go to all appointments, and call your doctor if you are having problems. It's also a good idea to know your test results and keep a list of the medicines you take. How can you care for yourself at home? · Make iron-rich foods a part of your daily diet. Iron-rich foods include: ¨ All meats, such as chicken, beef, lamb, pork, fish, and shellfish. Liver is especially high in iron. ¨ Leafy green vegetables. ¨ Raisins, peas, beans, lentils, barley, and eggs. ¨ Iron-fortified breakfast cereals. · Eat foods with vitamin C along with iron-rich foods. Vitamin C helps you absorb more iron from food. Drink a glass of orange juice or another citrus juice with your food. · Eat meat and vegetables or grains together. The iron in meat helps your body absorb the iron in other foods. Where can you learn more? Go to http://macy-ana m.info/. Enter [id number] in the search box to learn more about \"Iron-Rich Diet: Care Instructions. \" Current as of: May 12, 2017 Content Version: 11.4 © 0748-5925 [employer] Care instructions adapted under license by [employer] (which disclaims liability or warranty for this information). If you have questions about a medical condition or this instruction, always ask your healthcare professional. [address] any warranty or liability for your use of this information. Please provide this summary of care documentation to your next provider. Signatures-by signing, you are acknowledging that this After Visit Summary has been reviewed with you and you have received a copy. Patient Signature:  ____________________________________________________________ Date:  ____________________________________________________________  
  
Janis Daniela Provider Signature:  ____________________________________________________________ Date:  ____________________________________________________________

## 2018-06-19 NOTE — ED PROVIDER NOTES
EMERGENCY DEPARTMENT HISTORY AND PHYSICAL EXAM    12:57 AM      Date: 6/19/2018  Patient Name: Giovanni Restrepo    History of Presenting Illness     Chief Complaint   Patient presents with    Dizziness    Sinus Pain    Ear Pressure         History Provided By: Patient    Chief Complaint: Light headedness  Duration: 1 Days  Timing:  Intermittent  Location: N/A  Quality: N/A  Severity: Moderate  Modifying Factors: Worse when getting out of bed  Associated Symptoms: congestion, bilateral ear pain, diaphoresis      Additional History (Context): Giovanni Restrepo is a 50 y.o. female with No significant past medical history who presents with moserate, intermittent, light headedness, with associated symptoms of congestion, bilateral ear pain, diaphoresis. Pt states that symptoms worsen when getting out of bed. Pt is a smoker at 1/4 pack ped day and uses EtOH. Pt denies any other symptoms or complaints at this time. PCP: Christopher Abdullahi MD    Current Outpatient Prescriptions   Medication Sig Dispense Refill    HYDROcodone-acetaminophen (NORCO) 5-325 mg per tablet Take 1-2 tablets PO every 4-6 hours as needed for pain control. If over the counter ibuprofen or acetaminophen was suggested, then only take the vicodin for pain not well controlled with the over the counter medication. 6 Tab 0    meloxicam (MOBIC) 7.5 mg tablet Take 1 Tab by mouth two (2) times a day. 60 Tab 1    acetaminophen-codeine (TYLENOL-CODEINE #3) 300-30 mg per tablet Take 1 Tab by mouth every six (6) hours as needed for Pain. Max Daily Amount: 4 Tabs. 15 Tab 0    ondansetron (ZOFRAN ODT) 4 mg disintegrating tablet Take 1 Tab by mouth every eight (8) hours as needed for Nausea. 14 Tab 0    traMADol (ULTRAM) 50 mg tablet Take 1 Tab by mouth nightly as needed for Pain. Max Daily Amount: 50 mg.  Indications: Pain 14 Tab 2    meloxicam (MOBIC) 7.5 mg tablet TAKE ONE TABLET BY MOUTH TWICE DAILY WITH MEALS 60 Tab 0    traMADol (ULTRAM) 50 mg tablet Take 50 mg by mouth every six (6) hours as needed for Pain.  colchicine 0.6 mg tablet Take 1 Tab by mouth daily. Take 3 times daily on day 1. Then twice daily on days two and three. 7 Tab 0    omeprazole (PRILOSEC) 20 mg capsule Take 20 mg by mouth daily.  ferrous sulfate (IRON) 325 mg (65 mg iron) EC tablet Take 1 Tab by mouth two (2) times daily (with meals). 61 Tab 1       Past History     Past Medical History:  Past Medical History:   Diagnosis Date    BMI 38.0-38.9,adult 5/19/2017    BMI 40.0-44.9, adult ([facility] [address] 5/19/2017    Obesity (BMI 35.0-39.9 without comorbidity) 4/10/2017    Osteoarthritis     Ulcer     gi ulcer       Past Surgical History:  Past Surgical History:   Procedure Laterality Date    ABDOMEN SURGERY PROC UNLISTED      hernia    HX GYN      D&C       Family History:  Family History   Problem Relation Age of Onset    Arthritis-osteo Other        Social History:  Social History   Substance Use Topics    Smoking status: Current Every Day Smoker     Packs/day: 0.25     Years: 15.00    Smokeless tobacco: Never Used    Alcohol use Yes      Comment: occasionally       Allergies: Allergies   Allergen Reactions    Latex Rash    Penicillins Other (comments)         Review of Systems       Review of Systems   Constitutional: Positive for diaphoresis. Negative for chills and fever. HENT: Positive for congestion, dental problem and ear pain (bliateral). Eyes: Negative. Respiratory: Negative. Cardiovascular: Negative. Gastrointestinal: Negative. Negative for abdominal distention, abdominal pain, anal bleeding, blood in stool, constipation, diarrhea, nausea and vomiting. Endocrine: Negative. Genitourinary: Negative. Musculoskeletal: Negative. Skin: Negative. Allergic/Immunologic: Negative. Neurological: Positive for light-headedness. Hematological: Negative. Psychiatric/Behavioral: Negative.     All other systems reviewed and are negative. Physical Exam     Visit Vitals    /72 (BP 1 Location: Left arm, BP Patient Position: At rest)    Pulse 89    Temp 99.1 °F (37.3 °C)    Resp 16    Ht 4' 10\" (1.473 m)    Wt 88.5 kg (195 lb)    SpO2 99%    BMI 40.76 kg/m2         Physical Exam   Constitutional: She is oriented to person, place, and time. She appears well-developed and well-nourished. No distress. HENT:   Head: Normocephalic. Right Ear: External ear normal.   Left Ear: External ear normal.   Mouth/Throat: No oropharyngeal exudate. Bilateral ears showed a large amount of ear wax. Eyes: Conjunctivae and EOM are normal. Pupils are equal, round, and reactive to light. Right eye exhibits no discharge. Left eye exhibits no discharge. No scleral icterus. Neck: Normal range of motion. Neck supple. No JVD present. No tracheal deviation present. No thyromegaly present. Cardiovascular: Normal rate, regular rhythm, normal heart sounds and intact distal pulses. Exam reveals no gallop and no friction rub. No murmur heard. Pulmonary/Chest: Effort normal and breath sounds normal. No stridor. No respiratory distress. She has no wheezes. She has no rales. She exhibits no tenderness. Abdominal: Soft. Bowel sounds are normal. She exhibits no distension and no mass. There is no tenderness. There is no rebound and no guarding. Rectal exam: external exam: normal, (+) brown stool, hemacult - negative. Musculoskeletal: Normal range of motion. She exhibits no edema or tenderness. Lymphadenopathy:     She has no cervical adenopathy. Neurological: She is alert and oriented to person, place, and time. She displays normal reflexes. No cranial nerve deficit. She exhibits normal muscle tone. Coordination normal.   Skin: Skin is warm and dry. No rash noted. She is not diaphoretic. No erythema. No pallor. Nursing note and vitals reviewed.         Diagnostic Study Results     Labs -  No results found for this or any previous visit (from the past 12 hour(s)). Radiologic Studies -   No orders to display         Medical Decision Making   I am the first provider for this patient. I reviewed the vital signs, available nursing notes, past medical history, past surgical history, family history and social history. Vital Signs-Reviewed the patient's vital signs. Pulse Oximetry Analysis -  100% on room air (Interpretation)normal      Records Reviewed: Nursing Notes and Old Medical Records (Time of Review: 12:57 AM)    ED Course: Progress Notes, Reevaluation, and Consults:  Consult:  Discussed care with Dr Fransisco Whitten, Specialty: Hospitalist  Standard discussion; including history of patients chief complaint, available diagnostic results, and treatment course. [facility] does not have hematology, oncology support and recommends calling [facility].  2:00 AM, 6/19/2018      Consult:  Discussed care with Dr Krunal Soares, Specialty: ED attending [facility]  Standard discussion; including history of patients chief complaint, available diagnostic results, and treatment course. Will call back after speaking to hospitalist at [facility]  2:30 AM, 6/19/2018      2:40 AM:  Dr. Michelle Arias (ER attendant at [facility]) accepted patient for admission to [facility]. Patient will be an ER to ER transfer. Provider Notes (Medical Decision Making): Influenza, URI, Otitis Media, Pharyngitis, Bronchitis. Diagnosis     Clinical Impression:   Severe anemia    Disposition: transfer to [facility] ER for admission. Follow-up Information     None           Patient's Medications   Start Taking    No medications on file   Continue Taking    ACETAMINOPHEN-CODEINE (TYLENOL-CODEINE #3) 300-30 MG PER TABLET    Take 1 Tab by mouth every six (6) hours as needed for Pain. Max Daily Amount: 4 Tabs. COLCHICINE 0.6 MG TABLET    Take 1 Tab by mouth daily. Take 3 times daily on day 1. Then twice daily on days two and three.     FERROUS SULFATE (IRON) 325 MG (65 MG IRON) EC TABLET    Take 1 Tab by mouth two (2) times daily (with meals). HYDROCODONE-ACETAMINOPHEN (NORCO) 5-325 MG PER TABLET    Take 1-2 tablets PO every 4-6 hours as needed for pain control. If over the counter ibuprofen or acetaminophen was suggested, then only take the vicodin for pain not well controlled with the over the counter medication. MELOXICAM (MOBIC) 7.5 MG TABLET    TAKE ONE TABLET BY MOUTH TWICE DAILY WITH MEALS    MELOXICAM (MOBIC) 7.5 MG TABLET    Take 1 Tab by mouth two (2) times a day. OMEPRAZOLE (PRILOSEC) 20 MG CAPSULE    Take 20 mg by mouth daily. ONDANSETRON (ZOFRAN ODT) 4 MG DISINTEGRATING TABLET    Take 1 Tab by mouth every eight (8) hours as needed for Nausea. TRAMADOL (ULTRAM) 50 MG TABLET    Take 50 mg by mouth every six (6) hours as needed for Pain. TRAMADOL (ULTRAM) 50 MG TABLET    Take 1 Tab by mouth nightly as needed for Pain. Max Daily Amount: 50 mg. Indications: Pain   These Medications have changed    No medications on file   Stop Taking    No medications on file     _______________________________    Attestations:  [id number] acting as a scribe for and in the presence of Warden Alex MD      June 19, 2018 at 12:57 AM       Provider Attestation:      I personally performed the services described in the documentation, reviewed the documentation, as recorded by the scribe in my presence, and it accurately and completely records my words and actions.  June 19, 2018 at 12:57 AM - Warden Alex MD    _______________________________

## 2018-06-19 NOTE — ED PROVIDER NOTES
[facility] EMERGENCY DEPT      4:22 AM    Date: 6/19/2018  Patient Name: Alexander Looney    History of Presenting Illness     Chief Complaint   Patient presents with    Abnormal Lab Results    Dizziness       History Provided By: Patient    Chief Complaint: light headed and dizzy   Duration:  Days  Timing:  Constant  Location: n/a  Quality: n/a  Severity: N/A  Modifying Factors: none  Associated Symptoms: denies any other associated signs or symptoms    50 y.o. female with noted past medical history who presents to the emergency department c/o lightheadedness and dizziness for the past 2 days. Pt never had to get blood transfusion in the past. Still has her menstrual cycles but there very irregular but there are not heavy. No hx of GI bleeds. Denies CP, SOB, weakness, syncope, or any other associated sx. No other complaints. Nursing notes regarding the HPI and triage nursing notes were reviewed. Prior medical records were reviewed. Current Facility-Administered Medications   Medication Dose Route Frequency Provider Last Rate Last Dose    0.9% sodium chloride infusion 250 mL  250 mL IntraVENous PRN Ailyn Hernandez MD         Current Outpatient Prescriptions   Medication Sig Dispense Refill    omeprazole (PRILOSEC) 20 mg capsule Take 20 mg by mouth daily.  ferrous sulfate (IRON) 325 mg (65 mg iron) EC tablet Take 1 Tab by mouth two (2) times daily (with meals). 60 Tab 1    HYDROcodone-acetaminophen (NORCO) 5-325 mg per tablet Take 1-2 tablets PO every 4-6 hours as needed for pain control. If over the counter ibuprofen or acetaminophen was suggested, then only take the vicodin for pain not well controlled with the over the counter medication. 6 Tab 0    meloxicam (MOBIC) 7.5 mg tablet Take 1 Tab by mouth two (2) times a day. 60 Tab 1    acetaminophen-codeine (TYLENOL-CODEINE #3) 300-30 mg per tablet Take 1 Tab by mouth every six (6) hours as needed for Pain. Max Daily Amount: 4 Tabs. 15 Tab 0    ondansetron (ZOFRAN ODT) 4 mg disintegrating tablet Take 1 Tab by mouth every eight (8) hours as needed for Nausea. 14 Tab 0    traMADol (ULTRAM) 50 mg tablet Take 1 Tab by mouth nightly as needed for Pain. Max Daily Amount: 50 mg. Indications: Pain 14 Tab 2    meloxicam (MOBIC) 7.5 mg tablet TAKE ONE TABLET BY MOUTH TWICE DAILY WITH MEALS 60 Tab 0    traMADol (ULTRAM) 50 mg tablet Take 50 mg by mouth every six (6) hours as needed for Pain.  colchicine 0.6 mg tablet Take 1 Tab by mouth daily. Take 3 times daily on day 1. Then twice daily on days two and three. 7 Tab 0       Past History     Past Medical History:  Past Medical History:   Diagnosis Date    BMI 38.0-38.9,adult 5/19/2017    BMI 40.0-44.9, adult ([facility] [address] 5/19/2017    Obesity (BMI 35.0-39.9 without comorbidity) 4/10/2017    Osteoarthritis     Ulcer     gi ulcer       Past Surgical History:  Past Surgical History:   Procedure Laterality Date    ABDOMEN SURGERY PROC UNLISTED      hernia    HX GYN      D&C       Family History:  Family History   Problem Relation Age of Onset    Arthritis-osteo Other        Social History:  Social History   Substance Use Topics    Smoking status: Current Every Day Smoker     Packs/day: 0.25     Years: 15.00    Smokeless tobacco: Never Used    Alcohol use Yes      Comment: occasionally       Allergies: Allergies   Allergen Reactions    Latex Rash    Penicillins Other (comments)       Patient's primary care provider (as noted in EPIC):  Dee Galindo MD    Review of Systems   Constitutional: Negative for chills, fatigue and fever. HENT: Negative. Negative for sore throat. Eyes: Negative. Respiratory: Negative for cough and shortness of breath. Cardiovascular: Negative for chest pain and palpitations. Gastrointestinal: Negative for abdominal pain, nausea and vomiting. Genitourinary: Negative for dysuria. Musculoskeletal: Negative. Skin: Negative.     Neurological: Positive for dizziness and light-headedness. Negative for weakness and headaches. Psychiatric/Behavioral: Negative. All other systems reviewed and are negative. Visit Vitals    /76 (BP Patient Position: At rest)    Pulse 78    Temp 99.3 °F (37.4 °C)    Resp 20    Wt 88 kg (194 lb)    SpO2 100%    BMI 40.55 kg/m2       PHYSICAL EXAM:    CONSTITUTIONAL:  Alert, in no apparent distress;  well developed;  well nourished. HEAD:  Normocephalic, atraumatic. EYES:  EOMI. Non-icteric sclera. Normal conjunctiva. ENTM:  Nose:  no rhinorrhea. Throat:  no erythema or exudate, mucous membranes moist.  NECK:  No JVD. Supple  RESPIRATORY:  Chest clear, equal breath sounds, good air movement. CARDIOVASCULAR:  Regular rate and rhythm. No murmurs, rubs, or gallops. GI:  Normal bowel sounds, abdomen soft and non-tender. No rebound or guarding. BACK:  Non-tender. UPPER EXT:  Normal inspection. LOWER EXT:  No edema, no calf tenderness. Distal pulses intact. NEURO:  Moves all four extremities. Normal motor exam and sensation in all four extremities. Normal CN II-XII exam.  Normal bilateral finger-to-nose exam.      SKIN:  No rashes;  Normal for age. PSYCH:  Alert and normal affect. DIFFERENTIAL DIAGNOSES/ MEDICAL DECISION MAKING:   Dehydration, hyperglycemia-induced weakness, electrolyte and/or endocrine imbalance, CVA, intracranial hemorrhage, sepsis, cardiac arrhythmia, central versus peripheral vertigo, illicit drug intoxication, alcohol intoxication, prescribed drug toxicity, pregnancy in females patients, anxiety disorder, versus other etiologies or a combination of the above.       ED COURSE:      Diagnostic Study Results     Abnormal lab results from this emergency department encounter:  Labs Reviewed   [facility] [employer]   TYPE + CROSSMATCH       Lab values for this patient within approximately the last 12 hours:  Recent Results (from the past 12 hour(s))   CBC WITH AUTOMATED DIFF Collection Time: 06/19/18  1:20 AM   Result Value Ref Range    WBC 7.5 4.6 - 13.2 K/uL    RBC 3.39 (L) 4.20 - 5.30 M/uL    HGB 5.0 (LL) 12.0 - 16.0 g/dL    HCT 19.6 (L) 35.0 - 45.0 %    MCV 57.8 (L) 74.0 - 97.0 FL    MCH 14.7 (L) 24.0 - 34.0 PG    MCHC 25.5 (L) 31.0 - 37.0 g/dL    RDW 24.0 (H) 11.6 - 14.5 %    PLATELET 868 569 - 275 K/uL    MPV 8.8 (L) 9.2 - 11.8 FL    NEUTROPHILS 54 40 - 73 %    LYMPHOCYTES 38 21 - 52 %    MONOCYTES 5 3 - 10 %    EOSINOPHILS 2 0 - 5 %    BASOPHILS 1 0 - 2 %    ABS. NEUTROPHILS 3.9 1.8 - 8.0 K/UL    ABS. LYMPHOCYTES 2.9 0.9 - 3.6 K/UL    ABS. MONOCYTES 0.4 0.05 - 1.2 K/UL    ABS. EOSINOPHILS 0.2 0.0 - 0.4 K/UL    ABS.  BASOPHILS 0.1 (H) 0.0 - 0.06 K/UL    PLATELET COMMENTS ADEQUATE PLATELETS      RBC COMMENTS ANISOCYTOSIS  3+        RBC COMMENTS POIKILOCYTOSIS  2+        RBC COMMENTS RBC FRAGMENTS  TARGET CELLS  1+        RBC COMMENTS OVALOCYTES  1+        RBC COMMENTS TEARDROP CELLS  1+        RBC COMMENTS POLYCHROMASIA  1+        RBC COMMENTS HYPOCHROMIA  2+        DF AUTOMATED     METABOLIC PANEL, BASIC    Collection Time: 06/19/18  1:20 AM   Result Value Ref Range    Sodium 138 136 - 145 mmol/L    Potassium 3.8 3.5 - 5.5 mmol/L    Chloride 105 100 - 108 mmol/L    CO2 27 21 - 32 mmol/L    Anion gap 6 3.0 - 18 mmol/L    Glucose 103 (H) 74 - 99 mg/dL    BUN 13 7.0 - 18 MG/DL    Creatinine 0.61 0.6 - 1.3 MG/DL    BUN/Creatinine ratio 21 (H) 12 - 20      GFR est AA >60 >60 ml/min/1.73m2    GFR est non-AA >60 >60 ml/min/1.73m2    Calcium 9.2 8.5 - 10.1 MG/DL   PROTHROMBIN TIME + INR    Collection Time: 06/19/18  1:20 AM   Result Value Ref Range    Prothrombin time 13.4 11.5 - 15.2 sec    INR 1.1 0.8 - 1.2     INFLUENZA A & B AG (RAPID TEST)    Collection Time: 06/19/18  2:00 AM   Result Value Ref Range    Influenza A Antigen NEGATIVE  NEG      Influenza B Antigen NEGATIVE  NEG     TYPE & SCREEN    Collection Time: 06/19/18  2:40 AM   Result Value Ref Range    Crossmatch Expiration 06/22/2018     ABO/Rh(D) O POSITIVE     Antibody screen NEG    EKG, 12 LEAD, SUBSEQUENT    Collection Time: 06/19/18  2:50 AM   Result Value Ref Range    Ventricular Rate 78 BPM    Atrial Rate 78 BPM    P-R Interval 180 ms    QRS Duration 84 ms    Q-T Interval 412 ms    QTC Calculation (Bezet) 469 ms    Calculated P Axis 54 degrees    Calculated R Axis 26 degrees    Calculated T Axis 53 degrees    Diagnosis       Normal sinus rhythm  Normal ECG  When compared with ECG of 14-MAY-2015 00:31,  No significant change was found     URINALYSIS W/ RFLX MICROSCOPIC    Collection Time: 06/19/18  3:20 AM   Result Value Ref Range    Color YELLOW      Appearance CLOUDY      Specific gravity 1.017 1.005 - 1.030      pH (UA) 6.5 5.0 - 8.0      Protein NEGATIVE  NEG mg/dL    Glucose NEGATIVE  NEG mg/dL    Ketone NEGATIVE  NEG mg/dL    Bilirubin NEGATIVE  NEG      Blood NEGATIVE  NEG      Urobilinogen 1.0 0.2 - 1.0 EU/dL    Nitrites NEGATIVE  NEG      Leukocyte Esterase TRACE (A) NEG     URINE MICROSCOPIC ONLY    Collection Time: 06/19/18  3:20 AM   Result Value Ref Range    WBC 4 to 10 0 - 4 /hpf    RBC 4 to 10 0 - 5 /hpf    Epithelial cells 4+ 0 - 5 /lpf    Bacteria 2+ (A) NEG /hpf    Amorphous Crystals 3+ (A) NEG    CA Oxalate crystals 1+ (A) NEG       Radiologist and cardiologist interpretations if available at time of this note:  No results found. Medication(s) ordered for patient during this emergency visit encounter:  Medications   0.9% sodium chloride infusion 250 mL (not administered)       Medical Decision Making     I am the first provider for this patient. I reviewed the vital signs, available nursing notes, past medical history, past surgical history, family history and social history. Vital Signs:  Reviewed the patient's vital signs.     While unlikely, will assess the posterior fossa for hemorrhage with CT scan and will order routine labs seeking occult infection, metabolic derangement or evidence of anemia, acute worsening renal impairment, ACS/AMI (doubt), etc. My initial bedside impression is that this workup will likely be unremarkable and that the patient will ultimately be able to be discharged home, with instructions for further outpatient follow up and reevaluation by the patient's physician team.    Based on the patient's history of presenting illness, physical examination, laboratory, radiographic, and/or tests results, and response to medical interventions, I believe the patient most likely is having significant anemia which requires admission for blood transfusion and further evaluation. Coding Diagnoses     Clinical Impression:   1. Anemia, unspecified type        Disposition     Disposition:  Admit. TEMO Juárez Board Certified Emergency Physician    Provider Attestation:  If a scribe was utilized in generation of this patient record, I personally performed the services described in the documentation, reviewed the documentation, as recorded by the scribe in my presence, and it accurately records the patient's history of presenting illness, review of systems, patient physical examination, and procedures performed by me as the attending physician. TEMO Juárez Board Certified Emergency Physician  6/19/2018.  4:22 AM    Sc[address] acting as a scribe for and in the presence of Ruy Saucedo MD      June 19, 2018 at 4:26 AM

## 2018-06-19 NOTE — ED NOTES
Pt to bathroom, ambulatory with steady gait, this nurse present with pt, urine sample obtained and sent

## 2018-06-19 NOTE — H&P
History and Physical    Patient: Mirian Avila               Sex: female          DOA: 6/19/2018       YOB: 1965      Age:  50 y.o. Assessment/Plan     Symptomatic anemia Hg5  3u PRBC ordered  Hx of VICKY, no melena or heavy menstruation, needs Csp setup  Ferritin level pending, MCV=57  Start FeS04 TID  Can consider outpatient vs inpatient consultation    Obesity  OA    Code status: Full  PPX:  DVT: SCD   GI: None indicated    HPI:     Chief Complaint   Patient presents with    Abnormal Lab Results    Dizziness       Mirian Avila is a 50 y.o. female with PMHX of anemia, obesity who presents with 2 days of weakness and FLORES. Workup at [facility] found Hg 5. She was transferred for transfusion and further workup. She had a Hg of 7 back in 2015 and was started on iron however pt reports she is not currently on any medication. She had a colonoscopy possibly 20yrs ago. Denies any type of blood loss or melena. Past Medical History:   Diagnosis Date    BMI 38.0-38.9,adult 5/19/2017    BMI 40.0-44.9, adult ([facility] [address] 5/19/2017    Obesity (BMI 35.0-39.9 without comorbidity) 4/10/2017    Osteoarthritis     Ulcer     gi ulcer       Prior to Admission Medications   Prescriptions Last Dose Informant Patient Reported? Taking? HYDROcodone-acetaminophen (NORCO) 5-325 mg per tablet Not Taking at Unknown time  No No   Sig: Take 1-2 tablets PO every 4-6 hours as needed for pain control. If over the counter ibuprofen or acetaminophen was suggested, then only take the vicodin for pain not well controlled with the over the counter medication. acetaminophen-codeine (TYLENOL-CODEINE #3) 300-30 mg per tablet Not Taking at Unknown time  No No   Sig: Take 1 Tab by mouth every six (6) hours as needed for Pain. Max Daily Amount: 4 Tabs. colchicine 0.6 mg tablet Not Taking at Unknown time  No No   Sig: Take 1 Tab by mouth daily. Take 3 times daily on day 1. Then twice daily on days two and three. ferrous sulfate (IRON) 325 mg (65 mg iron) EC tablet Not Taking at Unknown time  No No   Sig: Take 1 Tab by mouth two (2) times daily (with meals). meloxicam (MOBIC) 7.5 mg tablet Not Taking at Unknown time  No No   Sig: TAKE ONE TABLET BY MOUTH TWICE DAILY WITH MEALS   meloxicam (MOBIC) 7.5 mg tablet Not Taking at Unknown time  No No   Sig: Take 1 Tab by mouth two (2) times a day. omeprazole (PRILOSEC) 20 mg capsule   Yes Yes   Sig: Take 20 mg by mouth daily. ondansetron (ZOFRAN ODT) 4 mg disintegrating tablet Not Taking at Unknown time  No No   Sig: Take 1 Tab by mouth every eight (8) hours as needed for Nausea. traMADol (ULTRAM) 50 mg tablet Not Taking at Unknown time  Yes No   Sig: Take 50 mg by mouth every six (6) hours as needed for Pain.   traMADol (ULTRAM) 50 mg tablet Not Taking at Unknown time  No No   Sig: Take 1 Tab by mouth nightly as needed for Pain. Max Daily Amount: 50 mg. Indications: Pain      Facility-Administered Medications: None       Social History:  Social History     Social History    Marital status: SINGLE     Spouse name: N/A    Number of children: N/A    Years of education: N/A     Occupational History    Not on file. Social History Main Topics    Smoking status: Current Every Day Smoker     Packs/day: 0.25     Years: 15.00    Smokeless tobacco: Never Used    Alcohol use Yes      Comment: occasionally    Drug use: No    Sexual activity: Not on file     Other Topics Concern    Not on file     Social History Narrative       Family History:  Family History   Problem Relation Age of Onset    Arthritis-osteo Other        Surgical History:  Past Surgical History:   Procedure Laterality Date    ABDOMEN SURGERY PROC UNLISTED      hernia    HX GYN      D&C       Review of Systems  Constitutional:  No fever or weight loss  HEENT:  No headache or visual changes  Cardiovascular:  No chest pain or diaphoresis  Respiratory:  No coughing, wheezing, or shortness of breath.   GI: No nausea or vomitting. No diarrhea  :  No hematuria or dysuria  Skin:  No rashes or moles  Neuro:  No seizures or syncope  Hematological:  No bruising or bleeding  Endocrine:  No diabetes or thyroid disease    Physical Exam:      Visit Vitals    /76 (BP Patient Position: At rest)    Pulse 78    Temp 99.3 °F (37.4 °C)    Resp 20    Wt 88 kg (194 lb)    SpO2 100%    BMI 40.55 kg/m2       Physical Exam:  Gen:  No distress, alert  HEENT:  Normal cephalic atraumatic, extra-occular movements are intact. Neck:  Supple, No JVD  Lungs:  Clear bilaterally, no wheeze, no rales, normal effort  Heart:  Regular Rate and Rhythm, normal S1 and S2, no edema  Abdomen:  Obese, Soft, non tender, normal bowel sounds, no guarding. Extremities:  Well perfused, no cyanosis or edema  Neurological:  Awake and alert, CN's are intact, normal strength throughout extremities  Skin:  No rashes or moles  Psych:  Normal thought process, does not appear anxious    Laboratory Studies: All lab results for the last 24 hours reviewed. Recent Results (from the past 12 hour(s))   CBC WITH AUTOMATED DIFF    Collection Time: 06/19/18  1:20 AM   Result Value Ref Range    WBC 7.5 4.6 - 13.2 K/uL    RBC 3.39 (L) 4.20 - 5.30 M/uL    HGB 5.0 (LL) 12.0 - 16.0 g/dL    HCT 19.6 (L) 35.0 - 45.0 %    MCV 57.8 (L) 74.0 - 97.0 FL    MCH 14.7 (L) 24.0 - 34.0 PG    MCHC 25.5 (L) 31.0 - 37.0 g/dL    RDW 24.0 (H) 11.6 - 14.5 %    PLATELET 377 040 - 541 K/uL    MPV 8.8 (L) 9.2 - 11.8 FL    NEUTROPHILS 54 40 - 73 %    LYMPHOCYTES 38 21 - 52 %    MONOCYTES 5 3 - 10 %    EOSINOPHILS 2 0 - 5 %    BASOPHILS 1 0 - 2 %    ABS. NEUTROPHILS 3.9 1.8 - 8.0 K/UL    ABS. LYMPHOCYTES 2.9 0.9 - 3.6 K/UL    ABS. MONOCYTES 0.4 0.05 - 1.2 K/UL    ABS. EOSINOPHILS 0.2 0.0 - 0.4 K/UL    ABS.  BASOPHILS 0.1 (H) 0.0 - 0.06 K/UL    PLATELET COMMENTS ADEQUATE PLATELETS      RBC COMMENTS ANISOCYTOSIS  3+        RBC COMMENTS POIKILOCYTOSIS  2+        RBC COMMENTS RBC FRAGMENTS  TARGET CELLS  1+        RBC COMMENTS OVALOCYTES  1+        RBC COMMENTS TEARDROP CELLS  1+        RBC COMMENTS POLYCHROMASIA  1+        RBC COMMENTS HYPOCHROMIA  2+        DF AUTOMATED     METABOLIC PANEL, BASIC    Collection Time: 06/19/18  1:20 AM   Result Value Ref Range    Sodium 138 136 - 145 mmol/L    Potassium 3.8 3.5 - 5.5 mmol/L    Chloride 105 100 - 108 mmol/L    CO2 27 21 - 32 mmol/L    Anion gap 6 3.0 - 18 mmol/L    Glucose 103 (H) 74 - 99 mg/dL    BUN 13 7.0 - 18 MG/DL    Creatinine 0.61 0.6 - 1.3 MG/DL    BUN/Creatinine ratio 21 (H) 12 - 20      GFR est AA >60 >60 ml/min/1.73m2    GFR est non-AA >60 >60 ml/min/1.73m2    Calcium 9.2 8.5 - 10.1 MG/DL   PROTHROMBIN TIME + INR    Collection Time: 06/19/18  1:20 AM   Result Value Ref Range    Prothrombin time 13.4 11.5 - 15.2 sec    INR 1.1 0.8 - 1.2     INFLUENZA A & B AG (RAPID TEST)    Collection Time: 06/19/18  2:00 AM   Result Value Ref Range    Influenza A Antigen NEGATIVE  NEG      Influenza B Antigen NEGATIVE  NEG     TYPE & SCREEN    Collection Time: 06/19/18  2:40 AM   Result Value Ref Range    Crossmatch Expiration 06/22/2018     ABO/Rh(D) Claudette Stalls POSITIVE     Antibody screen NEG    EKG, 12 LEAD, SUBSEQUENT    Collection Time: 06/19/18  2:50 AM   Result Value Ref Range    Ventricular Rate 78 BPM    Atrial Rate 78 BPM    P-R Interval 180 ms    QRS Duration 84 ms    Q-T Interval 412 ms    QTC Calculation (Bezet) 469 ms    Calculated P Axis 54 degrees    Calculated R Axis 26 degrees    Calculated T Axis 53 degrees    Diagnosis       Normal sinus rhythm  Normal ECG  When compared with ECG of 14-MAY-2015 00:31,  No significant change was found     URINALYSIS W/ RFLX MICROSCOPIC    Collection Time: 06/19/18  3:20 AM   Result Value Ref Range    Color YELLOW      Appearance CLOUDY      Specific gravity 1.017 1.005 - 1.030      pH (UA) 6.5 5.0 - 8.0      Protein NEGATIVE  NEG mg/dL    Glucose NEGATIVE  NEG mg/dL    Ketone NEGATIVE  NEG mg/dL Bilirubin NEGATIVE  NEG      Blood NEGATIVE  NEG      Urobilinogen 1.0 0.2 - 1.0 EU/dL    Nitrites NEGATIVE  NEG      Leukocyte Esterase TRACE (A) NEG     URINE MICROSCOPIC ONLY    Collection Time: 06/19/18  3:20 AM   Result Value Ref Range    WBC 4 to 10 0 - 4 /hpf    RBC 4 to 10 0 - 5 /hpf    Epithelial cells 4+ 0 - 5 /lpf    Bacteria 2+ (A) NEG /hpf    Amorphous Crystals 3+ (A) NEG    CA Oxalate crystals 1+ (A) NEG   TYPE + CROSSMATCH    Collection Time: 06/19/18  4:30 AM   Result Value Ref Range    Crossmatch Expiration 06/22/2018     ABO/Rh(D) PENDING     Antibody screen PENDING        Rad:  CXR 6/19-p

## 2018-06-19 NOTE — PROGRESS NOTES
Pt completed first unit blood transfusion. No signs of reaction. Second unit blood transfusion started with primary nurse Natalio Bowman.

## 2018-06-19 NOTE — ED NOTES
TRANSFER - OUT REPORT:    Verbal/phone report given to Xochilt Grimm RN (name) on Gibson Horowitz  being transferred to [facility] Emergency Room(unit) for routine progression of care       Report consisted of patients Situation, Background, Assessment and   Recommendations(SBAR). Information from the following report(s) SBAR, ED Summary, Procedure Summary, Intake/Output, MAR, Recent Results, Med Rec Status and Cardiac Rhythm NSR was reviewed with the receiving nurse. Lines:   Peripheral IV 06/19/18 Right Antecubital (Active)   Site Assessment Clean, dry, & intact 6/19/2018  1:20 AM   Phlebitis Assessment 0 6/19/2018  1:20 AM   Infiltration Assessment 0 6/19/2018  1:20 AM   Dressing Status Clean, dry, & intact 6/19/2018  1:20 AM   Dressing Type Tape;Transparent 6/19/2018  1:20 AM   Hub Color/Line Status Flushed;Patent 6/19/2018  1:20 AM   Action Taken Blood drawn 6/19/2018  1:20 AM        Opportunity for questions and clarification was provided.       Patient transported with:   Monitor  O2 @ 2 liters   [facility] transport service

## 2018-06-19 NOTE — PROGRESS NOTES
attempted to complete  the initial Spiritual Assessment of the patient in bed 7 of the emergency room  and offer Pastoral Care support. .  Found patient resting very peacefully after what may have been a long night in the emergency room. No family seen as at time of this visit. Patient does not have any Hoahaoism/cultural needs that will affect patients preferences in health care.  Chaplains will continue to follow and will provide pastoral care on an as needed/requested basis      Intern [address] [facility]   [phone]

## 2018-06-19 NOTE — ED NOTES
LDA removed in [facility] for documentation purposes only. Patient admitted to hospital with; site 1- Peripheral IV, which at time of admission is Clean, Dry, and intact, no signs or symptoms of phlebitis. No signs or symptoms of infiltration.

## 2018-06-19 NOTE — ED NOTES
1st unit of packed cells up and infusing. Reinforced with patient to call for any issues or complaints. Understanding verbalized.

## 2018-06-19 NOTE — ED NOTES
TRANSFER - ED to INPATIENT REPORT:    SBAR report made available to receiving floor on this patient being transferred to [address] [place] [address] ([facility])  for routine progression of care       Admitting diagnosis Symptomatic anemia    Information from the following report(s) ED Summary, MAR, Recent Results and Cardiac Rhythm SR was made available to receiving floor. Lines:   Peripheral IV 06/19/18 Right Antecubital (Active)   Site Assessment Clean, dry, & intact 6/19/2018  4:09 AM   Phlebitis Assessment 0 6/19/2018  4:09 AM   Infiltration Assessment 0 6/19/2018  4:09 AM   Dressing Status Clean, dry, & intact 6/19/2018  4:09 AM   Dressing Type Transparent 6/19/2018  4:09 AM   Hub Color/Line Status Flushed;Patent 6/19/2018  4:09 AM   Action Taken Blood drawn 6/19/2018  4:09 AM        Medication list unable to confirm    Opportunity for questions and clarification was provided. Patient is oriented to time, place, person and situation Blood transfusing; pt needs 2 more units of blood.   Patient is  continent and ambulatory without assist     Valuables transported with patient     Patient transported with:   Monitor  Registered Nurse

## 2018-06-20 VITALS
SYSTOLIC BLOOD PRESSURE: 145 MMHG | DIASTOLIC BLOOD PRESSURE: 75 MMHG | WEIGHT: 209 LBS | HEIGHT: 58 IN | RESPIRATION RATE: 16 BRPM | HEART RATE: 71 BPM | BODY MASS INDEX: 43.87 KG/M2 | OXYGEN SATURATION: 98 % | TEMPERATURE: 99.4 F

## 2018-06-20 LAB
ABO + RH BLD: NORMAL
ANION GAP SERPL CALC-SCNC: 7 MMOL/L (ref 3–18)
BASOPHILS # BLD: 0 K/UL (ref 0–0.06)
BASOPHILS NFR BLD: 1 % (ref 0–2)
BLD PROD TYP BPU: NORMAL
BLOOD GROUP ANTIBODIES SERPL: NORMAL
BPU ID: NORMAL
BUN SERPL-MCNC: 8 MG/DL (ref 7–18)
BUN/CREAT SERPL: 15 (ref 12–20)
CALCIUM SERPL-MCNC: 8.3 MG/DL (ref 8.5–10.1)
CALLED TO:,BCALL1: NORMAL
CHLORIDE SERPL-SCNC: 108 MMOL/L (ref 100–108)
CO2 SERPL-SCNC: 27 MMOL/L (ref 21–32)
CREAT SERPL-MCNC: 0.52 MG/DL (ref 0.6–1.3)
CROSSMATCH RESULT,%XM: NORMAL
DIFFERENTIAL METHOD BLD: ABNORMAL
EOSINOPHIL # BLD: 0.2 K/UL (ref 0–0.4)
EOSINOPHIL NFR BLD: 3 % (ref 0–5)
ERYTHROCYTE [DISTWIDTH] IN BLOOD BY AUTOMATED COUNT: 28.5 % (ref 11.6–14.5)
GLUCOSE SERPL-MCNC: 94 MG/DL (ref 74–99)
HCT VFR BLD AUTO: 28.2 % (ref 35–45)
HGB BLD-MCNC: 8.5 G/DL (ref 12–16)
LYMPHOCYTES # BLD: 3.1 K/UL (ref 0.9–3.6)
LYMPHOCYTES NFR BLD: 43 % (ref 21–52)
MCH RBC QN AUTO: 19.9 PG (ref 24–34)
MCHC RBC AUTO-ENTMCNC: 30.1 G/DL (ref 31–37)
MCV RBC AUTO: 65.9 FL (ref 74–97)
MONOCYTES # BLD: 0.4 K/UL (ref 0.05–1.2)
MONOCYTES NFR BLD: 6 % (ref 3–10)
NEUTS SEG # BLD: 3.5 K/UL (ref 1.8–8)
NEUTS SEG NFR BLD: 47 % (ref 40–73)
PLATELET # BLD AUTO: 104 K/UL (ref 135–420)
POTASSIUM SERPL-SCNC: 4.3 MMOL/L (ref 3.5–5.5)
RBC # BLD AUTO: 4.28 M/UL (ref 4.2–5.3)
SODIUM SERPL-SCNC: 142 MMOL/L (ref 136–145)
SPECIMEN EXP DATE BLD: NORMAL
STATUS OF UNIT,%ST: NORMAL
UNIT DIVISION, %UDIV: 0
WBC # BLD AUTO: 7.2 K/UL (ref 4.6–13.2)

## 2018-06-20 PROCEDURE — 74011250637 HC RX REV CODE- 250/637: Performed by: INTERNAL MEDICINE

## 2018-06-20 PROCEDURE — 99218 HC RM OBSERVATION: CPT

## 2018-06-20 PROCEDURE — 36415 COLL VENOUS BLD VENIPUNCTURE: CPT | Performed by: HOSPITALIST

## 2018-06-20 PROCEDURE — 74011250636 HC RX REV CODE- 250/636: Performed by: INTERNAL MEDICINE

## 2018-06-20 PROCEDURE — 85025 COMPLETE CBC W/AUTO DIFF WBC: CPT | Performed by: HOSPITALIST

## 2018-06-20 PROCEDURE — 80048 BASIC METABOLIC PNL TOTAL CA: CPT | Performed by: HOSPITALIST

## 2018-06-20 RX ORDER — CETIRIZINE HCL 10 MG
10 TABLET ORAL DAILY
Qty: 14 TAB | Refills: 0 | Status: SHIPPED | OUTPATIENT
Start: 2018-06-20 | End: 2019-06-02

## 2018-06-20 RX ORDER — FERROUS SULFATE 300 MG/5ML
300 LIQUID (ML) ORAL
Qty: 200 ML | Refills: 0 | Status: SHIPPED | OUTPATIENT
Start: 2018-06-20 | End: 2018-07-12 | Stop reason: SDUPTHER

## 2018-06-20 RX ORDER — CETIRIZINE HCL 10 MG
10 TABLET ORAL DAILY
Qty: 30 TAB | Refills: 0 | Status: SHIPPED | OUTPATIENT
Start: 2018-06-20 | End: 2018-07-12 | Stop reason: SDUPTHER

## 2018-06-20 RX ORDER — FERROUS SULFATE 300 MG/5ML
300 LIQUID (ML) ORAL
Qty: 200 ML | Refills: 0 | Status: SHIPPED | OUTPATIENT
Start: 2018-06-20 | End: 2018-06-20

## 2018-06-20 RX ADMIN — SODIUM CHLORIDE 75 ML/HR: 900 INJECTION, SOLUTION INTRAVENOUS at 06:38

## 2018-06-20 RX ADMIN — ENOXAPARIN SODIUM 40 MG: 100 INJECTION SUBCUTANEOUS at 06:33

## 2018-06-20 RX ADMIN — MINERAL SUPPLEMENT IRON 300 MG / 5 ML STRENGTH LIQUID 100 PER BOX UNFLAVORED 300 MG: at 10:49

## 2018-06-20 RX ADMIN — MINERAL SUPPLEMENT IRON 300 MG / 5 ML STRENGTH LIQUID 100 PER BOX UNFLAVORED 300 MG: at 14:20

## 2018-06-20 RX ADMIN — ACETAMINOPHEN 650 MG: 650 SOLUTION ORAL at 10:54

## 2018-06-20 NOTE — MANAGEMENT PLAN
Discharge/Transition Planning    Patient with no insurance. Pays scripts [facility]. PCP is Dr Iza Luna and last appt 2 months ago. She will call and make follow up. Pt independent with ADLs. Family here to get her. Anxious to leave. Discharge Plan : Home    Care Management Interventions  PCP Verified by CM: Yes (Dr Iza Luna)  Last Visit to PCP: 04/20/18  Mode of Transport at Discharge:  Other (see comment) (family)  Transition of Care Consult (CM Consult): Discharge Planning  Current Support Network: Own Home  Confirm Follow Up Transport: Self  Plan discussed with Pt/Family/Caregiver: Yes  Freedom of Choice Offered: Yes  Discharge Location  Discharge Placement: Home     Reason for Admission:   Symptomatic anemia                   RRAT Score:     7                Plan for utilizing home health:     n/a                     Likelihood of Readmission:  Low                         Transition of Care Plan:           Home      Kita Moreno RN BSN  Outcomes Manager  Pager # [phone]

## 2018-06-20 NOTE — PROGRESS NOTES
Telephone call made to phlebectomy dept for schedule lab drawn, will f/u with day shift. Pt had two episodes of soft BM during night shift, See flowsheet. Bedside shift change report given to Jennifer Pinedo RN (oncoming nurse) by Julio Wahl RN (offgoing nurse). Report included the following information SBAR, Intake/Output, MAR, Recent Results and Cardiac Rhythm NSR, HR 66.

## 2018-06-20 NOTE — DISCHARGE SUMMARY
2 [facility]  Hospitalist Division    Discharge Summary    Patient: Amy Browne MRN: [id number]  CSN: [id number]    YOB: 1965  Age: 50 y.o. Sex: female    DOA: 6/19/2018 LOS:  LOS: 0 days   Discharge Date:      Admission Diagnoses: Symptomatic anemia    Discharge Diagnoses:    Problem List as of 6/20/2018  Date Reviewed: 6/4/2018          Codes Class Noted - Resolved    Symptomatic anemia ICD-10-CM: D64.9  ICD-9-CM: 285.9  6/19/2018 - Present        BMI 38.0-38.9,adult ICD-10-CM: B51.31  ICD-9-CM: V85.38  5/19/2017 - Present        BMI 40.0-44.9, adult ([address] ICD-10-CM: Z68.41  ICD-9-CM: V85.41  5/19/2017 - Present        Obesity (BMI 35.0-39.9 without comorbidity) ICD-10-CM: E66.9  ICD-9-CM: 278.00  4/10/2017 - Present        Acute pharyngitis ICD-10-CM: J02.9  ICD-9-CM: 668  3/28/2015 - Present              Discharge Condition: Good    Discharge To: Home    Consults: None    Hospital Course:     50 y.o. Female with PMHx of VICKY, acute pharyngitis, obesity presents with c/o lightheadedness and dizziness for the past 2 days. Patient has not had to have transfusions in the past. She states that she still has menstrual cycles, but they are irregular and not heavy. The patient will be admitted for further evaluation. Symptomatic anemia Hgb 5/Hct 19.6 (6/19/2018). Patient given 3 units PRBCs. The patient was started on IVFs and ferrous sulfate 300 mg/5mL oral syrup. H/H (6/20/2018) 8.5/28.2. VSS. The patient is informed to follow-up with her PCP and hemocology in 1 week in regards to her recent hospitalization and to take prescriptions as prescribed. Patient to arrange. Physical Exam:  General appearance: alert, cooperative, no distress  Lungs: CTA  Heart: RRR, S1, S2 normal, no murmur, click, rub or gallop  Abdomen: soft, non-tender.  Bowel sounds normal.   Extremities: extremities normal, atraumatic, no cyanosis or edema  Skin: Skin color, texture, turgor normal.   PSY: Mood and affect normal, appropriately behaved    Significant Diagnostic Studies:   CHEST PA AND LATERAL      CPT CODE: 63093     HISTORY: Sinus pain and pressure, intermittent dizziness, headache, right ear  pressure.     COMPARISON: Chest x-ray May 14, 2015.     FINDINGS:     PA and lateral views obtained. The cardiac silhouette is mildly enlarged. The  lungs are clear. Pulmonary vascularity is normal. The costophrenic angles are  sharply defined. Mild disc space narrowing with marginal spurring involves the  mid and lower thoracic spine. .     IMPRESSION:     Mild stable cardiomegaly. EKG  Normal sinus rhythm   Normal ECG   When compared with ECG of 14-MAY-2015 00:31,   No significant change was found     Discharge Medications:     Current Discharge Medication List      CONTINUE these medications which have NOT CHANGED    Details   omeprazole (PRILOSEC) 20 mg capsule Take 20 mg by mouth daily. HYDROcodone-acetaminophen (NORCO) 5-325 mg per tablet Take 1-2 tablets PO every 4-6 hours as needed for pain control. If over the counter ibuprofen or acetaminophen was suggested, then only take the vicodin for pain not well controlled with the over the counter medication. Qty: 6 Tab, Refills: 0    Associated Diagnoses: Closed nondisplaced fracture of proximal phalanx of lesser toe of right foot, initial encounter      !! meloxicam (MOBIC) 7.5 mg tablet Take 1 Tab by mouth two (2) times a day. Qty: 60 Tab, Refills: 1    Associated Diagnoses: Chronic pain of both knees      acetaminophen-codeine (TYLENOL-CODEINE #3) 300-30 mg per tablet Take 1 Tab by mouth every six (6) hours as needed for Pain. Max Daily Amount: 4 Tabs. Qty: 15 Tab, Refills: 0      ondansetron (ZOFRAN ODT) 4 mg disintegrating tablet Take 1 Tab by mouth every eight (8) hours as needed for Nausea. Qty: 14 Tab, Refills: 0      !! traMADol (ULTRAM) 50 mg tablet Take 1 Tab by mouth nightly as needed for Pain. Max Daily Amount: 50 mg. Indications: Pain  Qty: 14 Tab, Refills: 2    Comments: [place] Regulation 18V EX56-68-30 dated March 15, 2017, states, \" acute pain\" medication shall not be prescribed by a medical provider with a short acting opioid for more than 7 days. !! meloxicam (MOBIC) 7.5 mg tablet TAKE ONE TABLET BY MOUTH TWICE DAILY WITH MEALS  Qty: 60 Tab, Refills: 0    Associated Diagnoses: Chronic pain of both knees      !! traMADol (ULTRAM) 50 mg tablet Take 50 mg by mouth every six (6) hours as needed for Pain. colchicine 0.6 mg tablet Take 1 Tab by mouth daily. Take 3 times daily on day 1. Then twice daily on days two and three. Qty: 7 Tab, Refills: 0      ferrous sulfate (IRON) 325 mg (65 mg iron) EC tablet Take 1 Tab by mouth two (2) times daily (with meals). Qty: 60 Tab, Refills: 1       !! - Potential duplicate medications found. Please discuss with provider. Start taking zyrtec 10mg daily for allergy symptoms         Activity: Activity as tolerated    Diet: Cardiac Diet    Wound Care: None needed    Follow-up:     Patient to follow-up with PCP within 1 week to discuss her recent hospitalization. Patient to follow-up with hemocology Dr. Jc Caballero ([phone]) within 1 week. Patient to arrange.      Discharge time > 35 minutes   Mann Reynolds NP  6/20/2018, 10:32 AM

## 2018-06-20 NOTE — PROGRESS NOTES
Problem: Falls - Risk of  Goal: *Absence of Falls  Document Yara Fall Risk and appropriate interventions in the flowsheet.    Outcome: Progressing Towards Goal  Fall Risk Interventions:            Medication Interventions: Patient to call before getting OOB, Teach patient to arise slowly         History of Falls Interventions: Door open when patient unattended

## 2018-06-20 NOTE — PROGRESS NOTES
1130-Received Bedside shift report from [address] [place] [address]. 2nd unit of PRBC infusing. No adverse reactions noted at this time. 1230 - Second unit of PRBC completed. No adverse reactions noted. 1437- 3rd unit of PRBC infusion started. Blood Administration Flowsheet in progress. 1732 - Blood transfusion completed. No adverse reactions noted. 2000 - Bedside and Verbal shift change report given to Josh Nelson RN (oncoming nurse) by Adrianna Uribe RN (offgoing nurse). Report included the following information SBAR, Kardex, Intake/Output, MAR, Recent Results and Cardiac Rhythm NSR.     2100 - Pt IV came out as patient was ambulating to bathroom. IV restarted in R hand 22g X1. NS 75ml/hour infusing. 2130 - Admission Data Base completed. Family at bedside.

## 2018-06-20 NOTE — DISCHARGE INSTRUCTIONS
Admit Date:  6/19/18    Discharge Date:   6/20/18    Admission Diagnoses:    Symptomatic anemia - Hgb 5/Hct 19.6 (6/19/2018). Discharge Diagnoses:    Symptomatic anemia    Activity: Activity as tolerated     Diet: Cardiac Diet     Wound Care: None needed     Follow-up:      Patient to follow-up with PCP within 1 week to discuss her recent hospitalization. Patient to arrange.             Anemia: Care Instructions  Your Care Instructions    Anemia is a low level of red blood cells, which carry oxygen throughout your body. Many things can cause anemia. Lack of iron is one of the most common causes. Your body needs iron to make hemoglobin, a substance in red blood cells that carries oxygen from the lungs to your body's cells. Without enough iron, the body produces fewer and smaller red blood cells. As a result, your body's cells do not get enough oxygen, and you feel tired and weak. And you may have trouble concentrating. Bleeding is the most common cause of a lack of iron. You may have heavy menstrual bleeding or bleeding caused by conditions such as ulcers, hemorrhoids, or cancer. Regular use of aspirin or other anti-inflammatory medicines (such as ibuprofen) also can cause bleeding in some people. A lack of iron in your diet also can cause anemia, especially at times when the body needs more iron, such as during pregnancy, infancy, and the teen years. Your doctor may have prescribed iron pills. It may take several months of treatment for your iron levels to return to normal. Your doctor also may suggest that you eat foods that are rich in iron, such as meat and beans. There are many other causes of anemia. It is not always due to a lack of iron. Finding the specific cause of your anemia will help your doctor find the right treatment for you. Follow-up care is a key part of your treatment and safety. Be sure to make and go to all appointments, and call your doctor if you are having problems.  It's also a good idea to know your test results and keep a list of the medicines you take. How can you care for yourself at home? · Take your medicines exactly as prescribed. Call your doctor if you think you are having a problem with your medicine. · If your doctor recommends iron pills, take them as directed:  ¨ Try to take the pills on an empty stomach about 1 hour before or 2 hours after meals. But you may need to take iron with food to avoid an upset stomach. ¨ Do not take antacids or drink milk or caffeine drinks (such as coffee, tea, or cola) at the same time or within 2 hours of the time that you take your iron. They can make it hard for your body to absorb the iron. ¨ Vitamin C (from food or supplements) helps your body absorb iron. Try taking iron pills with a glass of orange juice or some other food that is high in vitamin C, such as citrus fruits. ¨ Iron pills may cause stomach problems, such as heartburn, nausea, diarrhea, constipation, and cramps. Be sure to drink plenty of fluids, and include fruits, vegetables, and fiber in your diet each day. Iron pills often make your bowel movements dark or green. ¨ If you forget to take an iron pill, do not take a double dose of iron the next time you take a pill. ¨ Keep iron pills out of the reach of small children. An overdose of iron can be very dangerous. · Follow your doctor's advice about eating iron-rich foods. These include red meat, shellfish, poultry, eggs, beans, raisins, whole-grain bread, and leafy green vegetables. · Steam vegetables to help them keep their iron content. When should you call for help? Call 911 anytime you think you may need emergency care. For example, call if:  ? · You have symptoms of a heart attack. These may include:  ¨ Chest pain or pressure, or a strange feeling in the chest.  ¨ Sweating. ¨ Shortness of breath. ¨ Nausea or vomiting.   ¨ Pain, pressure, or a strange feeling in the back, neck, jaw, or upper belly or in one or both shoulders or arms. ¨ Lightheadedness or sudden weakness. ¨ A fast or irregular heartbeat. After you call 911, the  may tell you to chew 1 adult-strength or 2 to 4 low-dose aspirin. Wait for an ambulance. Do not try to drive yourself. ? · You passed out (lost consciousness). ?Call your doctor now or seek immediate medical care if:  ? · You have new or increased shortness of breath. ? · You are dizzy or lightheaded, or you feel like you may faint. ? · Your fatigue and weakness continue or get worse. ? · You have any abnormal bleeding, such as:  ¨ Nosebleeds. ¨ Vaginal bleeding that is different (heavier, more frequent, at a different time of the month) than what you are used to. ¨ Bloody or black stools, or rectal bleeding. ¨ Bloody or pink urine. ? Watch closely for changes in your health, and be sure to contact your doctor if:  ? · You do not get better as expected. Where can you learn more? Go to http://macy-ana m.info/. Enter R301 in the search box to learn more about \"Anemia: Care Instructions. \"  Current as of: October 13, 2016  Content Version: 11.4  © 8379-8029 [employer]. Care instructions adapted under license by [employer] (which disclaims liability or warranty for this information). If you have questions about a medical condition or this instruction, always ask your healthcare professional. [place] [address] any warranty or liability for your use of this information. Learning About Blood Transfusions  What is a blood transfusion? Blood transfusion is a medical treatment to replace the blood or parts of blood that your body has lost. The blood goes through a tube from a bag to an intravenous (IV) catheter and into your vein. You may need a blood transfusion after losing blood from an injury, a major surgery, an illness that causes bleeding, or an illness that destroys blood cells.   Transfusions are also used to give you the parts of blood-such as platelets, plasma, or substances that cause clotting-that your body needs to fight an illness or stop bleeding. How is a blood transfusion done? Before you receive a blood transfusion, your blood is tested to find out what your blood type is. Blood or blood parts that are a match with your blood type are ordered by your doctor. Blood is typed as A, B, AB, or O. It is also typed as Rh-positive or Rh-negative. Your blood is also screened to look for antibodies that might react with the blood that is given to you. The blood you are getting is checked and rechecked to make sure that it's the right type for you. A sample of your blood is mixed with a sample of the blood you will receive to check for problems. Before actually giving you the transfusion, a doctor and nurses will look at the label on the package of blood and compare it to your hospital ID bracelet and medical records. The transfusion begins only when all agree that this is the correct blood and that you are the correct person to receive it. To receive the transfusion, you will have an intravenous (IV) catheter inserted into a vein. A tube connects the catheter to the bag containing the blood, which is placed higher than your body. The blood then flows slowly into your vein. A doctor or nurse will check you several times during the transfusion to watch for a reaction or other problems. What are the possible risks? Blood transfusions have many benefits and are often life-saving. But they also have a few risks. Possible risks include:  · Your body's reaction to receiving new blood. This may include:  ¨ Fever. ¨ Allergic reactions. ¨ Breathing problems. · An infection from the blood. This risk is small because of the strict rules placed on handling and storing blood. Getting a viral infection, such as HIV or hepatitis B or C, through blood transfusions has become very rare. The [place]  Food and Drug Administration (FDA) enforces strict guidelines on the collection, testing, storage, and use of blood. · Getting the wrong blood type by accident. Severe reactions, which can be life-threatening, are very rare. What can you expect after a blood transfusion? Here are some things you can do at home to help prevent infection at the transfusion site:  · Wash the area daily with warm, soapy water, and pat it dry. Don't use hydrogen peroxide or alcohol, which can slow healing. You may cover the area with a gauze bandage if it weeps or rubs against clothing. Change the bandage every day. · Keep the area clean and dry. When should you call for help? Call 911 anytime you think you may need emergency care. For example, call if:  · You have severe trouble breathing. Call your doctor now or seek immediate medical care if:  · You have a fever. · You feel weaker or more tired than usual.  · You have a yellow tint to your skin or the whites of your eyes. Watch closely for changes in your health, and be sure to contact your doctor if you have any problems. Follow-up care is a key part of your treatment and safety. Be sure to make and go to all appointments, and call your doctor if you are having problems. It's also a good idea to know your test results and keep a list of the medicines you take. Where can you learn more? Go to http://macy-ana m.info/. Enter V588 in the search box to learn more about \"Learning About Blood Transfusions. \"  Current as of: October 13, 2016  Content Version: 11.4  © 1496-3122 [employer]. Care instructions adapted under license by [employer] (which disclaims liability or warranty for this information). If you have questions about a medical condition or this instruction, always ask your healthcare professional. [address] any warranty or liability for your use of this information.     MyChart Activation    Thank you for requesting access to [facility]. Please follow the instructions below to securely access and download your online medical record. [facility] allows you to send messages to your doctor, view your test results, renew your prescriptions, schedule appointments, and more. How Do I Sign Up? 1. In your internet browser, go to [email or web address]  2. Click on the First Time User? Click Here link in the Sign In box. You will be redirect to the New Member Sign Up page. 3. Enter your [facility] Access Code exactly as it appears below. You will not need to use this code after youve completed the sign-up process. If you do not sign up before the expiration date, you must request a new code. [facility] Access Code: [email or web address]  Expires: 2018  3:00 PM (This is the date your [employer] access code will )    4. Enter the last four digits of your Social Security Number (xxxx) and Date of Birth (mm/dd/yyyy) as indicated and click Submit. You will be taken to the next sign-up page. 5. Create a [facility] ID. This will be your [facility] login ID and cannot be changed, so think of one that is secure and easy to remember. 6. Create a [facility] password. You can change your password at any time. 7. Enter your Password Reset Question and Answer. This can be used at a later time if you forget your password. 8. Enter your e-mail address. You will receive e-mail notification when new information is available in [address] 9. Click Sign Up. You can now view and download portions of your medical record. 10. Click the Download Summary menu link to download a portable copy of your medical information. Additional Information    If you have questions, please visit the Frequently Asked Questions section of the [employer] website at [email or web address] Remember, [employer] is NOT to be used for urgent needs. For medical emergencies, dial 911.     Patient armband removed and shredded

## 2018-06-27 ENCOUNTER — OFFICE VISIT (OUTPATIENT)
Dept: ORTHOPEDIC SURGERY | Facility: CLINIC | Age: 53
End: 2018-06-27

## 2018-06-27 VITALS
HEIGHT: 58 IN | OXYGEN SATURATION: 100 % | BODY MASS INDEX: 42.32 KG/M2 | WEIGHT: 201.6 LBS | RESPIRATION RATE: 18 BRPM | TEMPERATURE: 98.3 F | SYSTOLIC BLOOD PRESSURE: 150 MMHG | DIASTOLIC BLOOD PRESSURE: 79 MMHG | HEART RATE: 84 BPM

## 2018-06-27 DIAGNOSIS — M79.671 RIGHT FOOT PAIN: ICD-10-CM

## 2018-06-27 DIAGNOSIS — S92.911A FRACTURE OF PROXIMAL PHALANX OF TOE OF RIGHT FOOT: Primary | ICD-10-CM

## 2018-06-27 NOTE — LETTER
NOTIFICATION RETURN TO WORK / SCHOOL 
 
6/27/2018 11:11 AM 
 
Ms. Kim Rousseau [place] [address] To Whom It May Concern: 
 
Kim Rousseau is currently under the care of [address] [place] [address]. She will return to work/school on 06-28-18. Full Duty with no restrictions. If there are questions or concerns please have the patient contact our office.  
 
 
 
Sincerely, 
 
 
Marlon Mack PA-C

## 2018-06-27 NOTE — PROGRESS NOTES
Patient: Alexander Looney                MRN: [id number]       SSN: [id number]  YOB: 1965        AGE: 50 y.o. SEX: female    PCP: Kelsey Gamboa MD  06/27/18 06/27/18: Follow up right 2nd toe fracture, no pain, wearing regular shoe, wishes to RTW 28 June 18. HISTORY:  Alexander Looney is a 50 y.o. female who is seen for a recent right foot injury. She fell at home on a slippery wet floor 5/23/2018. She reports striking her right second toe upon falling. She was seen at [facility] where x rays were read as showing a second toe proximal phalanx fracture by the ED eric. Post op wooden shoe was provided. She reports pain and swelling since the injury--especially with ambulation. She was previously seen for increased bilateral knee (R>L) pain and swelling. She was previously seen by Dr. Noemy Mabry in February of 2017. She responded to a knee cortisone injection. .  She did not wish to be seen by Dr. Noemy Mabry again since she felt like he was not listening to her. She notes increased bilateral knee pain for about a year. She notes pain with standing and walking. She completed a bilateral Synvisc series on 6/2/17 with minimal relief. 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 She has high blood pressure. She is not diabetic. She reports that she has lost 15 pounds recently by cutting back on greasy foods and drinking more water.   Current weight is 197 pounds. She is 4'10\" tall. She lives alone in [place]. She says that her 14-year-old daughter lives across the street. She has a 66-year-old son who lives in [place], and she has a 14-year-old son who is in the [facility]. Her son in the [facility] is stationed in [place] and will return to the [facility] [employer] in October. Her oldest son has three children and her daughter has five children. She walks regularly for exercise. She is allergic to latex. Weight Metrics 6/27/2018 6/20/2018 6/19/2018 6/19/2018 6/19/2018 6/4/2018 5/23/2018   Weight 201 lb 9.6 oz 209 lb - 195 lb - 198 lb 6.4 oz 200 lb   BMI 42.13 kg/m2 - 43.68 kg/m2 - 40.76 kg/m2 41.47 kg/m2 41.8 kg/m2     Patient Active Problem List   Diagnosis Code    Acute pharyngitis J02.9    Obesity (BMI 35.0-39.9 without comorbidity) E66.9    BMI 38.0-38.9,adult Z68.38    BMI 40.0-44.9, adult (HCC) Z68.41    Symptomatic anemia D64.9     REVIEW OF SYSTEMS: All Below are Negative except: See HPI   Constitutional: negative for fever, chills, and weight loss. Cardiovascular: negative for chest pain, claudication, leg swelling, SOB, FLORES   Gastrointestinal: Negative for pain, N/V/C/D, Blood in stool or urine, dysuria,  hematuria, incontinence, pelvic pain. Musculoskeletal: See HPI   Neurological: Negative for dizziness and weakness. Negative for headaches, Visual changes, confusion, seizures   Phychiatric/Behavioral: Negative for depression, memory loss, substance  abuse. Extremities: Negative for hair changes, rash, or skin lesion changes. Hematologic: Negative for bleeding problems, bruising, pallor or swollen lymph  nodes   Peripheral Vascular: No calf pain, no circulation deficits. Social History     Social History    Marital status: SINGLE     Spouse name: N/A    Number of children: N/A    Years of education: N/A     Occupational History    Not on file.      Social History Main Topics    Smoking status: Current Every Day Smoker     Packs/day: 0.25     Years: 15.00    Smokeless tobacco: Never Used    Alcohol use Yes      Comment: occasionally    Drug use: No    Sexual activity: Not on file     Other Topics Concern    Not on file     Social History Narrative      Allergies   Allergen Reactions    Latex Rash    Penicillins Other (comments)      Current Outpatient Prescriptions   Medication Sig    omeprazole (PRILOSEC) 20 mg capsule Take 20 mg by mouth daily.  ferrous sulfate 300 mg (60 mg iron)/5 mL syrup Take 5 mL by mouth three (3) times daily (with meals).  cetirizine (ZYRTEC) 10 mg tablet Take 1 Tab by mouth daily.  cetirizine (ZYRTEC) 10 mg tablet Take 1 Tab by mouth daily. Indications: allergy    HYDROcodone-acetaminophen (NORCO) 5-325 mg per tablet Take 1-2 tablets PO every 4-6 hours as needed for pain control. If over the counter ibuprofen or acetaminophen was suggested, then only take the vicodin for pain not well controlled with the over the counter medication.  meloxicam (MOBIC) 7.5 mg tablet Take 1 Tab by mouth two (2) times a day.  acetaminophen-codeine (TYLENOL-CODEINE #3) 300-30 mg per tablet Take 1 Tab by mouth every six (6) hours as needed for Pain. Max Daily Amount: 4 Tabs.  ondansetron (ZOFRAN ODT) 4 mg disintegrating tablet Take 1 Tab by mouth every eight (8) hours as needed for Nausea.  traMADol (ULTRAM) 50 mg tablet Take 1 Tab by mouth nightly as needed for Pain. Max Daily Amount: 50 mg. Indications: Pain    meloxicam (MOBIC) 7.5 mg tablet TAKE ONE TABLET BY MOUTH TWICE DAILY WITH MEALS    traMADol (ULTRAM) 50 mg tablet Take 50 mg by mouth every six (6) hours as needed for Pain.  colchicine 0.6 mg tablet Take 1 Tab by mouth daily. Take 3 times daily on day 1. Then twice daily on days two and three.  ferrous sulfate (IRON) 325 mg (65 mg iron) EC tablet Take 1 Tab by mouth two (2) times daily (with meals). No current facility-administered medications for this visit. PHYSICAL EXAMINATION:  Visit Vitals    /79    Pulse 84    Temp 98.3 °F (36.8 °C) (Oral)    Resp 18    Ht 4' 10\" (1.473 m)    Wt 201 lb 9.6 oz (91.4 kg)    SpO2 100%    BMI 42.13 kg/m2      ORTHO EXAMINATION:                                                                                                                                                                        Examination  Right Ankle/Foot   Skin  Intact   Swelling  Trace (+) second toe   Dorsiflexion  10   Plantarflexion  25   Deformity  -   Inversion laxity  -   Anterior drawer  -   Medial tenderness  -   Lateral tenderness  -   Heel cord  Intact   Sensation  Intact   Bunion  -   Toe nails  Normal   Capillary refill  Normal     significant tenderness/swelling over base of 2nd toe    RADIOGRAPHS:  XR RT FOOT 06/27/18  IMPRESSION:  Three views - small minimally displaced oblique fracture of 2nd phalanx healing from comparison from imaging from 5/23/18 , no bunion deformity, no heel spur. XR LT FOOT 6/8/2016  IMPRESSION:  1. No evidence of acute fracture. 2. Mild soft tissue swelling at the distal aspect of the foot. 3. Degenerative changes, as above. XR MICHAELLE KNEES 5/26/15  IMPRESSION:  No fractures, no effusion, severe medial joint space narrowing on the right, severe lateral joint space narrowing on the left, no osteophytes present. XR LEFT KNEE 3/3/15  IMPRESSION:  Moderate suprapatellar joint effusion. XR RIGHT KNEE 7/3/13  IMPRESSION:  Moderate changes of osteoarthritis. Large suprapatellar effusion. IMPRESSION:      ICD-10-CM ICD-9-CM    1. Fracture of proximal phalanx of toe of right foot S92.911A 826.0 AMB POC XRAY, FOOT; COMPLETE, 3+ VIEW   2. Right foot pain M79.671 729.5 AMB POC XRAY, FOOT; COMPLETE, 3+ VIEW     PLAN: RTO PRN, RTW on 28 June full Duty.

## 2018-07-12 ENCOUNTER — OFFICE VISIT (OUTPATIENT)
Dept: ORTHOPEDIC SURGERY | Facility: CLINIC | Age: 53
End: 2018-07-12

## 2018-07-12 VITALS
SYSTOLIC BLOOD PRESSURE: 153 MMHG | BODY MASS INDEX: 41.98 KG/M2 | WEIGHT: 200 LBS | HEIGHT: 58 IN | OXYGEN SATURATION: 93 % | HEART RATE: 87 BPM | RESPIRATION RATE: 16 BRPM | DIASTOLIC BLOOD PRESSURE: 85 MMHG | TEMPERATURE: 98.8 F

## 2018-07-12 DIAGNOSIS — G89.29 CHRONIC PAIN OF BOTH KNEES: ICD-10-CM

## 2018-07-12 DIAGNOSIS — M17.11 PRIMARY OSTEOARTHRITIS OF RIGHT KNEE: Primary | ICD-10-CM

## 2018-07-12 DIAGNOSIS — M25.562 CHRONIC PAIN OF BOTH KNEES: ICD-10-CM

## 2018-07-12 DIAGNOSIS — M25.561 CHRONIC PAIN OF BOTH KNEES: ICD-10-CM

## 2018-07-12 RX ORDER — BUPIVACAINE HYDROCHLORIDE 2.5 MG/ML
4 INJECTION, SOLUTION EPIDURAL; INFILTRATION; INTRACAUDAL ONCE
Qty: 4 ML | Refills: 0
Start: 2018-07-12 | End: 2018-07-12

## 2018-07-12 RX ORDER — BETAMETHASONE SODIUM PHOSPHATE AND BETAMETHASONE ACETATE 3; 3 MG/ML; MG/ML
6 INJECTION, SUSPENSION INTRA-ARTICULAR; INTRALESIONAL; INTRAMUSCULAR; SOFT TISSUE ONCE
Qty: 0.5 ML | Refills: 0
Start: 2018-07-12 | End: 2018-07-12

## 2018-07-12 NOTE — PATIENT INSTRUCTIONS
Knee Arthritis: Exercises Your Care Instructions Here are some examples of exercises for knee arthritis. Start each exercise slowly. Ease off the exercise if you start to have pain. Your doctor or physical therapist will tell you when you can start these exercises and which ones will work best for you. How to do the exercises Knee flexion with heel slide 1. Lie on your back with your knees bent. 2. Slide your heel back by bending your affected knee as far as you can. Then hook your other foot around your ankle to help pull your heel even farther back. 3. Hold for about 6 seconds, then rest for up to 10 seconds. 4. Repeat 8 to 12 times. 5. Switch legs and repeat steps 1 through 4, even if only one knee is sore. [employer] 1. Sit with your affected leg straight and supported on the floor or a firm bed. Place a small, rolled-up towel under your knee. Your other leg should be bent, with that foot flat on the floor. 2. Tighten the thigh muscles of your affected leg by pressing the back of your knee down into the towel. 3. Hold for about 6 seconds, then rest for up to 10 seconds. 4. Repeat 8 to 12 times. 5. Switch legs and repeat steps 1 through 4, even if only one knee is sore. Straight-leg raises to the front 1. Lie on your back with your good knee bent so that your foot rests flat on the floor. Your affected leg should be straight. Make sure that your low back has a normal curve. You should be able to slip your hand in between the floor and the small of your back, with your palm touching the floor and your back touching the back of your hand. 2. Tighten the thigh muscles in your affected leg by pressing the back of your knee flat down to the floor. Hold your knee straight. 3. Keeping the thigh muscles tight and your leg straight, lift your affected leg up so that your heel is about 12 inches off the floor. Hold for about 6 seconds, then lower slowly.  
4. Relax for up to 10 seconds between repetitions. 5. Repeat 8 to 12 times. 6. Switch legs and repeat steps 1 through 5, even if only one knee is sore. Active knee flexion 1. Lie on your stomach with your knees straight. If your kneecap is uncomfortable, roll up a washcloth and put it under your leg just above your kneecap. 2. Lift the foot of your affected leg by bending the knee so that you bring the foot up toward your buttock. If this motion hurts, try it without bending your knee quite as far. This may help you avoid any painful motion. 3. Slowly move your leg up and down. 4. Repeat 8 to 12 times. 5. Switch legs and repeat steps 1 through 4, even if only one knee is sore. Quadriceps stretch (facedown) 1. Lie flat on your stomach, and rest your face on the floor. 2. Wrap a towel or belt strap around the lower part of your affected leg. Then use the towel or belt strap to slowly pull your heel toward your buttock until you feel a stretch. 3. Hold for about 15 to 30 seconds, then relax your leg against the towel or belt strap. 4. Repeat 2 to 4 times. 5. Switch legs and repeat steps 1 through 4, even if only one knee is sore. Stationary exercise bike 1. If you do not have a stationary exercise bike at home, you can find one to ride at your local health club or community center. 2. Adjust the height of the bike seat so that your knee is slightly bent when your leg is extended downward. If your knee hurts when the pedal reaches the top, you can raise the seat so that your knee does not bend as much. 3. Start slowly. At first, try to do 5 to 10 minutes of cycling with little to no resistance. Then increase your time and the resistance bit by bit until you can do 20 to 30 minutes without pain. 4. If you start to have pain, rest your knee until your pain gets back to the level that is normal for you. Or cycle for less time or with less effort. Follow-up care is a key part of your treatment and safety.  Be sure to make and go to all appointments, and call your doctor if you are having problems. It's also a good idea to know your test results and keep a list of the medicines you take. Where can you learn more? Go to http://macy-ana m.info/. Enter C159 in the search box to learn more about \"Knee Arthritis: Exercises. \" Current as of: November 29, 2017 Content Version: 11.7 © 2006-2018 [employer]. Care instructions adapted under license by [employer] (which disclaims liability or warranty for this information). If you have questions about a medical condition or this instruction, always ask your healthcare professional. [address] any warranty or liability for your use of this information. Learning About Joint Injections What are joint injections? Joint injections are shots into a joint, such as the knee or shoulder. They are used to put in medicines, such as pain relievers and steroid medicines. Steroids can be injected directly into a swollen and painful joint to reduce inflammation. A steroid shot can sometimes help with short-term pain relief when other treatments haven't worked. If steroid shots help, pain may improve for weeks or months. How are they done? First, the area over the joint will be cleaned. Your doctor may then use a tiny needle to numb the skin in the area where you will get the joint injection. If a tiny needle is used to numb the area, your doctor will use another needle to inject the medicine. Your doctor may use a pain reliever, a steroid, or both. You may feel some pressure or discomfort. The procedure takes 10 to 30 minutes. But the injection itself usually takes only a few minutes. Your doctor may put ice on the area before you go home. You will probably go home soon after your shot. What can you expect after a joint injection? You may have numbness around the joint for a few hours.  
If your shot included both a pain reliever and a steroid, then the pain will probably go away right away. But it might come back after a few hours. This might happen if the pain reliever wears off and the steroid hasn't started to work yet. Steroids don't always work. But when they do, the pain relief can last for several days to a few months or longer. Your doctor may tell you to use ice on the area. You can also use ice if the pain comes back. Put ice or a cold pack on your joint for 10 to 20 minutes at a time. Put a thin cloth between the ice and your skin. Follow your doctor's instructions carefully. Follow-up care is a key part of your treatment and safety. Be sure to make and go to all appointments, and call your doctor if you are having problems. It's also a good idea to know your test results and keep a list of the medicines you take. Where can you learn more? Go to http://macy-ana m.info/. Enter P014 in the search box to learn more about \"Learning About Joint Injections. \" Current as of: September 14, 2017 Content Version: 11.7 © 2564-8782 [employer]. Care instructions adapted under license by [employer] (which disclaims liability or warranty for this information). If you have questions about a medical condition or this instruction, always ask your healthcare professional. [address] any warranty or liability for your use of this information.

## 2018-07-12 NOTE — PROGRESS NOTES
Patient: Janina Aponte                MRN: [id number]       SSN: [id number] YOB: 1965        AGE: 50 y.o. SEX: female PCP: Cortes Portillo MD 
07/12/18 Chief Complaint Patient presents with  Knee Pain MICHAELLE KNEE PAIN, F/U APPT. HISTORY:  Janina Aponte is a 50 y.o. female who is seen seen for increased bilateral knee (R>L) pain and swelling. She notes increased pain and swelling since she broke her toes. She was previously seen by Dr. Katelyn Barreto in February of 2017. She responded to a knee cortisone injection. She did not wish to be seen by Dr. Katelyn Barreto again since she felt like he was not listening to her. She notes increased bilateral knee pain for about a year. She notes pain with standing and walking. She completed a bilateral Synvisc series on 6/2/17 with minimal relief. 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 She was previously seen for a recent right foot injury. She fell at home on a slippery wet floor 5/23/2018. She reports striking her right second toe upon falling. She was seen at [facility] where x rays were read as showing a second toe proximal phalanx fracture by the ED eric. Post op wooden shoe was provided. She reports pain and swelling since the injury--especially with ambulation. Pain Assessment  7/12/2018 Location of Pain Knee Pain Location Comment - Location Modifiers Left;Right Severity of Pain 10 Quality of Pain Throbbing;Grinding;Locking Duration of Pain Persistent Frequency of Pain Constant Aggravating Factors Standing;Walking; Other (Comment) Aggravating Factors Comment WHEN LAYING DOWN Limiting Behavior No  
Relieving Factors Rest  
Result of Injury No  
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 She has high blood pressure. She is not diabetic.   She reports that she has lost 15 pounds recently by cutting back on greasy foods and drinking more water. Current weight is 197 pounds. She is 4'10\" tall. She lives alone in [place]. She says that her 27-year-old daughter lives across the street. She has a 49-year-old son who lives in [place], and she has a 49-year-old son who is in the [facility]. Her son in the [employer] is stationed in [place] and will return to the [facility] in October. Her oldest son has three children and her daughter has five children. She walks regularly for exercise. She is allergic to latex. Weight Metrics 7/12/2018 6/27/2018 6/20/2018 6/19/2018 6/19/2018 6/19/2018 6/4/2018 Weight 200 lb 201 lb 9.6 oz 209 lb - 195 lb - 198 lb 6.4 oz BMI 41.8 kg/m2 42.13 kg/m2 - 43.68 kg/m2 - 40.76 kg/m2 41.47 kg/m2 Patient Active Problem List  
Diagnosis Code  Acute pharyngitis J02.9  Obesity (BMI 35.0-39.9 without comorbidity) E66.9  BMI 38.0-38.9,adult Z68.38  BMI 40.0-44.9, [facility] Z68.41  Symptomatic anemia D64.9 REVIEW OF SYSTEMS: All Below are Negative except: See HPI Constitutional: negative for fever, chills, and weight loss. Cardiovascular: negative for chest pain, claudication, leg swelling, SOB, FLORES Gastrointestinal: Negative for pain, N/V/C/D, Blood in stool or urine, dysuria,  hematuria, incontinence, pelvic pain. Musculoskeletal: See HPI Neurological: Negative for dizziness and weakness. Negative for headaches, Visual changes, confusion, seizures Phychiatric/Behavioral: Negative for depression, memory loss, substance  abuse. Extremities: Negative for hair changes, rash, or skin lesion changes. Hematologic: Negative for bleeding problems, bruising, pallor or swollen lymph  nodes Peripheral Vascular: No calf pain, no circulation deficits. Social History Social History  Marital status: SINGLE Spouse name: N/A  
 Number of children: N/A  
 Years of education: N/A Occupational History  Not on file. Social History Main Topics  Smoking status: Current Every Day Smoker Packs/day: 0.25 Years: 15.00  Smokeless tobacco: Never Used  Alcohol use Yes Comment: occasionally  Drug use: No  
 Sexual activity: Not on file Other Topics Concern  Not on file Social History Narrative Allergies Allergen Reactions  Latex Rash  Penicillins Other (comments) Current Outpatient Prescriptions Medication Sig  
 betamethasone (CELESTONE SOLUSPAN) 6 mg/mL injection 1 mL by Intra artICUlar route once for 1 dose.  bupivacaine, PF, (MARCAINE, PF,) 0.25 % (2.5 mg/mL) injection 4 mL by Intra artICUlar route once for 1 dose.  cetirizine (ZYRTEC) 10 mg tablet Take 1 Tab by mouth daily.  ferrous sulfate (IRON) 325 mg (65 mg iron) EC tablet Take 1 Tab by mouth two (2) times daily (with meals).  HYDROcodone-acetaminophen (NORCO) 5-325 mg per tablet Take 1-2 tablets PO every 4-6 hours as needed for pain control. If over the counter ibuprofen or acetaminophen was suggested, then only take the vicodin for pain not well controlled with the over the counter medication.  acetaminophen-codeine (TYLENOL-CODEINE #3) 300-30 mg per tablet Take 1 Tab by mouth every six (6) hours as needed for Pain. Max Daily Amount: 4 Tabs.  ondansetron (ZOFRAN ODT) 4 mg disintegrating tablet Take 1 Tab by mouth every eight (8) hours as needed for Nausea.  traMADol (ULTRAM) 50 mg tablet Take 1 Tab by mouth nightly as needed for Pain. Max Daily Amount: 50 mg. Indications: Pain  meloxicam (MOBIC) 7.5 mg tablet TAKE ONE TABLET BY MOUTH TWICE DAILY WITH MEALS  colchicine 0.6 mg tablet Take 1 Tab by mouth daily. Take 3 times daily on day 1. Then twice daily on days two and three.  omeprazole (PRILOSEC) 20 mg capsule Take 20 mg by mouth daily. No current facility-administered medications for this visit. PHYSICAL EXAMINATION: 
Visit Vitals  /85  Pulse 87  Temp 98.8 °F (37.1 °C) (Oral)  Resp 16  
 Ht 4' 10\" (1.473 m)  Wt 200 lb (90.7 kg)  SpO2 93%  BMI 41.8 kg/m2 ORTHO EXAMINATION: 
Examination Right knee Left knee Skin Intact Intact Range of motion 100-0 100-0 Effusion + - Medial joint line tenderness + + Lateral joint line tenderness - - Popliteal tenderness - +, fullness Osteophytes palpable + + Rajnis - - Patella crepitus + + Anterior drawer - - Lateral laxity - - Medial laxity - - Varus deformity +, mild -  
Valgus deformity - +, mild Pretibial edema - - Calf tenderness - - Examination Right Ankle/Foot Left Ankle/Foot Skin Intact Intact Swelling Base of 2nd toe  -  
Dorsiflexion 10 10 Plantarflexion 25 25 Deformity - - Inversion laxity - - Anterior drawer - - Medial tenderness - - Lateral tenderness - - Heel cord Intact Intact Sensation Intact Intact Bunion - - Toe nails Normal Normal  
Capillary refill Normal Normal  
 
Examination Left Ankle/Foot Right Ankle/Foot Skin Intact Intact Swelling - + second toe Dorsiflexion 10 10 Plantarflexion 25 25 Deformity - - Inversion laxity - - Anterior drawer - - Medial tenderness - - Lateral tenderness - - Heel cord Intact Intact Sensation Intact Intact Bunion - - Toe nails Normal Normal  
Capillary refill Normal Normal  
tenderness/swelling over base of 2nd toe PROCEDURE: After discussing treatment options, patient's right knee  was injected with 4 cc Marcaine and 1/2 cc Celestone. Chart reviewed for the following: 
 Villa Mcardle, MD, have reviewed the History, Physical and updated the Allergic reactions for [facility] TIME OUT performed immediately prior to start of procedure: 
Villa Mcardle, MD, have performed the following reviews on [facility] prior to the start of the procedure: 
         
* Patient was identified by name and date of birth * Agreement on procedure being performed was verified * Risks and Benefits explained to the patient * Procedure site verified and marked as necessary * Patient was positioned for comfort * Consent was obtained Time: 3:43 PM  
 
Date of procedure: 7/12/2018 Procedure performed by:  Vance Bass MD 
 
Ms. Rose tolerated the procedure well with no complications. RADIOGRAPHS: 
XR RT FOOT 5/23/2018 IMPRESSION 1. No definite acute fracture. Likely old fracture of the second proximal 
phalanx. Correlate clinically for point tenderness to exclude acuity. 
-I have independently reviewed these images during this office visit. -Dr. Miguel Gorman IMPRESSION:  Three views - small minimally displaced oblique fracture of 2nd phalynx, no bunion deformity, no heel spur. XR LT FOOT 6/8/2016 IMPRESSION: 
1. No evidence of acute fracture. 2. Mild soft tissue swelling at the distal aspect of the foot. 3. Degenerative changes, as above. XR MICHAELLE KNEES 5/26/15 IMPRESSION:  No fractures, no effusion, severe medial joint space narrowing on the right, severe lateral joint space narrowing on the left, no osteophytes present. XR LEFT KNEE 3/3/15 IMPRESSION:  Moderate suprapatellar joint effusion. XR RIGHT KNEE 7/3/13 IMPRESSION:  Moderate changes of osteoarthritis. Large suprapatellar effusion. IMPRESSION:   
  ICD-10-CM ICD-9-CM 1. Primary osteoarthritis of right knee M17.11 715.16 betamethasone (CELESTONE SOLUSPAN) 6 mg/mL injection BETAMETHASONE ACETATE & SODIUM PHOSPHATE INJECTION 3 MG EA.  
   DRAIN/INJECT LARGE JOINT/BURSA  
   bupivacaine, PF, (MARCAINE, PF,) 0.25 % (2.5 mg/mL) injection 2. Chronic pain of both knees M25.561 719.46   
 M25.562 338.29 G89.29 PLAN:  After discussing treatment options, patient's right knee was injected with 4 cc Marcaine and 1/2 cc Celestone. There is no need for surgery at this time. She will follow up in one month.   
 
Scribed by Uhce Kumari [address]) as dictated by Celi Villarreal Ciarra Gregory MD

## 2018-07-23 ENCOUNTER — OFFICE VISIT (OUTPATIENT)
Dept: ORTHOPEDIC SURGERY | Facility: CLINIC | Age: 53
End: 2018-07-23

## 2018-07-23 VITALS
TEMPERATURE: 97.8 F | RESPIRATION RATE: 18 BRPM | SYSTOLIC BLOOD PRESSURE: 148 MMHG | HEART RATE: 85 BPM | HEIGHT: 58 IN | WEIGHT: 200.6 LBS | BODY MASS INDEX: 42.11 KG/M2 | DIASTOLIC BLOOD PRESSURE: 75 MMHG | OXYGEN SATURATION: 99 %

## 2018-07-23 DIAGNOSIS — M17.11 PRIMARY OSTEOARTHRITIS OF RIGHT KNEE: Primary | ICD-10-CM

## 2018-07-23 DIAGNOSIS — G89.29 CHRONIC PAIN OF BOTH KNEES: ICD-10-CM

## 2018-07-23 DIAGNOSIS — M25.562 CHRONIC PAIN OF BOTH KNEES: ICD-10-CM

## 2018-07-23 DIAGNOSIS — M17.12 PRIMARY OSTEOARTHRITIS OF LEFT KNEE: ICD-10-CM

## 2018-07-23 DIAGNOSIS — M25.461 KNEE EFFUSION, RIGHT: ICD-10-CM

## 2018-07-23 DIAGNOSIS — M25.561 CHRONIC PAIN OF BOTH KNEES: ICD-10-CM

## 2018-07-23 RX ORDER — BUPIVACAINE HYDROCHLORIDE 2.5 MG/ML
10 INJECTION, SOLUTION EPIDURAL; INFILTRATION; INTRACAUDAL ONCE
Qty: 10 ML | Refills: 0
Start: 2018-07-23 | End: 2018-07-23

## 2018-07-23 RX ORDER — BUPIVACAINE HYDROCHLORIDE 2.5 MG/ML
4 INJECTION, SOLUTION EPIDURAL; INFILTRATION; INTRACAUDAL ONCE
Qty: 4 ML | Refills: 0
Start: 2018-07-23 | End: 2018-07-23

## 2018-07-23 RX ORDER — BETAMETHASONE SODIUM PHOSPHATE AND BETAMETHASONE ACETATE 3; 3 MG/ML; MG/ML
6 INJECTION, SUSPENSION INTRA-ARTICULAR; INTRALESIONAL; INTRAMUSCULAR; SOFT TISSUE ONCE
Qty: 0.5 ML | Refills: 0
Start: 2018-07-23 | End: 2018-07-23

## 2018-07-23 RX ORDER — DEXTROMETHORPHAN HYDROBROMIDE, GUAIFENESIN 5; 100 MG/5ML; MG/5ML
650 LIQUID ORAL EVERY 8 HOURS
COMMUNITY
End: 2019-06-02

## 2018-07-23 NOTE — PATIENT INSTRUCTIONS
Knee Arthritis: Exercises Your Care Instructions Here are some examples of exercises for knee arthritis. Start each exercise slowly. Ease off the exercise if you start to have pain. Your doctor or physical therapist will tell you when you can start these exercises and which ones will work best for you. How to do the exercises Knee flexion with heel slide 1. Lie on your back with your knees bent. 2. Slide your heel back by bending your affected knee as far as you can. Then hook your other foot around your ankle to help pull your heel even farther back. 3. Hold for about 6 seconds, then rest for up to 10 seconds. 4. Repeat 8 to 12 times. 5. Switch legs and repeat steps 1 through 4, even if only one knee is sore. [employer] Charge 1. Sit with your affected leg straight and supported on the floor or a firm bed. Place a small, rolled-up towel under your knee. Your other leg should be bent, with that foot flat on the floor. 2. Tighten the thigh muscles of your affected leg by pressing the back of your knee down into the towel. 3. Hold for about 6 seconds, then rest for up to 10 seconds. 4. Repeat 8 to 12 times. 5. Switch legs and repeat steps 1 through 4, even if only one knee is sore. Straight-leg raises to the front 1. Lie on your back with your good knee bent so that your foot rests flat on the floor. Your affected leg should be straight. Make sure that your low back has a normal curve. You should be able to slip your hand in between the floor and the small of your back, with your palm touching the floor and your back touching the back of your hand. 2. Tighten the thigh muscles in your affected leg by pressing the back of your knee flat down to the floor. Hold your knee straight. 3. Keeping the thigh muscles tight and your leg straight, lift your affected leg up so that your heel is about 12 inches off the floor. Hold for about 6 seconds, then lower slowly.  
4. Relax for up to 10 seconds between repetitions. 5. Repeat 8 to 12 times. 6. Switch legs and repeat steps 1 through 5, even if only one knee is sore. Active knee flexion 1. Lie on your stomach with your knees straight. If your kneecap is uncomfortable, roll up a washcloth and put it under your leg just above your kneecap. 2. Lift the foot of your affected leg by bending the knee so that you bring the foot up toward your buttock. If this motion hurts, try it without bending your knee quite as far. This may help you avoid any painful motion. 3. Slowly move your leg up and down. 4. Repeat 8 to 12 times. 5. Switch legs and repeat steps 1 through 4, even if only one knee is sore. Quadriceps stretch (facedown) 1. Lie flat on your stomach, and rest your face on the floor. 2. Wrap a towel or belt strap around the lower part of your affected leg. Then use the towel or belt strap to slowly pull your heel toward your buttock until you feel a stretch. 3. Hold for about 15 to 30 seconds, then relax your leg against the towel or belt strap. 4. Repeat 2 to 4 times. 5. Switch legs and repeat steps 1 through 4, even if only one knee is sore. Stationary exercise bike 1. If you do not have a stationary exercise bike at home, you can find one to ride at your local health club or community center. 2. Adjust the height of the bike seat so that your knee is slightly bent when your leg is extended downward. If your knee hurts when the pedal reaches the top, you can raise the seat so that your knee does not bend as much. 3. Start slowly. At first, try to do 5 to 10 minutes of cycling with little to no resistance. Then increase your time and the resistance bit by bit until you can do 20 to 30 minutes without pain. 4. If you start to have pain, rest your knee until your pain gets back to the level that is normal for you. Or cycle for less time or with less effort. Follow-up care is a key part of your treatment and safety.  Be sure to make and go to all appointments, and call your doctor if you are having problems. It's also a good idea to know your test results and keep a list of the medicines you take. Where can you learn more? Go to http://macy-ana m.info/. Enter C159 in the search box to learn more about \"Knee Arthritis: Exercises. \" Current as of: November 29, 2017 Content Version: 11.7 © 20062160-7526 [employer]. Care instructions adapted under license by [employer] (which disclaims liability or warranty for this information). If you have questions about a medical condition or this instruction, always ask your healthcare professional. [place] [address] any warranty or liability for your use of this information. Knee Arthritis: Care Instructions Your Care Instructions Knee arthritis is a breakdown of the cartilage that cushions your knee joint. When the cartilage wears down, your bones rub against each other. This causes pain and stiffness. Knee arthritis tends to get worse with time. Treatment for knee arthritis involves reducing pain, making the leg muscles stronger, and staying at a healthy body weight. The treatment usually does not improve the health of the cartilage, but it can reduce pain and improve how well your knee works. You can take simple measures to protect your knee joints, ease your pain, and help you stay active. Follow-up care is a key part of your treatment and safety. Be sure to make and go to all appointments, and call your doctor if you are having problems. It's also a good idea to know your test results and keep a list of the medicines you take. How can you care for yourself at home? · Know that knee arthritis will cause more pain on some days than on others. · Stay at a healthy weight. Lose weight if you are overweight. When you stand up, the pressure on your knees from every pound of body weight is multiplied four times.  So if you lose 10 pounds, you will reduce the pressure on your knees by 40 pounds. · Talk to your doctor or physical therapist about exercises that will help ease joint pain. ¨ Stretch to help prevent stiffness and to prevent injury before you exercise. You may enjoy gentle forms of yoga to help keep your knee joints and muscles flexible. ¨ Walk instead of jog. ¨ Ride a bike. This makes your thigh muscles stronger and takes pressure off your knee. ¨ Wear well-fitting and comfortable shoes. ¨ Exercise in chest-deep water. This can help you exercise longer with less pain. ¨ Avoid exercises that include squatting or kneeling. They can put a lot of strain on your knees. ¨ Talk to your doctor to make sure that the exercise you do is not making the arthritis worse. · Do not sit for long periods of time. Try to walk once in a while to keep your knee from getting stiff. · Ask your doctor or physical therapist whether shoe inserts may reduce your arthritis pain. · If you can afford it, get new athletic shoes at least every year. This can help reduce the strain on your knees. · Use a device to help you do everyday activities. ¨ A cane or walking stick can help you keep your balance when you walk. Hold the cane or walking stick in the hand opposite the painful knee. ¨ If you feel like you may fall when you walk, try using crutches or a front-wheeled walker. These can prevent falls that could cause more damage to your knee. ¨ A knee brace may help keep your knee stable and prevent pain. ¨ You also can use other things to make life easier, such as a higher toilet seat and handrails in the bathtub or shower. · Take pain medicines exactly as directed. ¨ Do not wait until you are in severe pain. You will get better results if you take it sooner. ¨ If you are not taking a prescription pain medicine, take an over-the-counter medicine such as acetaminophen (Tylenol), ibuprofen (Advil, Motrin), or naproxen (Aleve).  Read and follow all instructions on the label. ¨ Do not take two or more pain medicines at the same time unless the doctor told you to. Many pain medicines have acetaminophen, which is Tylenol. Too much acetaminophen (Tylenol) can be harmful. ¨ Tell your doctor if you take a blood thinner, have diabetes, or have allergies to shellfish. · Ask your doctor if you might benefit from a shot of steroid medicine into your knee. This may provide pain relief for several months. · Many people take the supplements glucosamine and chondroitin for osteoarthritis. Some people feel they help, but the medical research does not show that they work. Talk to your doctor before you take these supplements. When should you call for help? Call your doctor now or seek immediate medical care if: 
  · You have sudden swelling, warmth, or pain in your knee.  
  · You have knee pain and a fever or rash.  
  · You have such bad pain that you cannot use your knee.  
 Watch closely for changes in your health, and be sure to contact your doctor if you have any problems. Where can you learn more? Go to http://macy-ana m.info/. Enter B997 in the search box to learn more about \"Knee Arthritis: Care Instructions. \" Current as of: October 10, 2017 Content Version: 11.7 © 6578-7391 [employer]. Care instructions adapted under license by [employer] (which disclaims liability or warranty for this information). If you have questions about a medical condition or this instruction, always ask your healthcare professional. [place] [address] any warranty or liability for your use of this information.

## 2018-07-23 NOTE — PROGRESS NOTES
Patient: Robyn Mao                MRN: [id number]       SSN: [id number] YOB: 1965        AGE: 50 y.o. SEX: female PCP: Mine Schroeder MD 
07/23/18 Chief Complaint Patient presents with  Knee Pain Roque HISTORY:  Robyn Mao is a 50 y.o. female who is seen seen for increased bilateral knee (R>L) pain and swelling. She notes increased pain and swelling since she broke her toes. She was previously seen by Dr. Zoltan Reyes in February of 2017. She did not wish to be seen by Dr. Zoltan Reyes again since she felt like he was not listening to her. She notes increased bilateral knee pain for about a year. She notes pain with standing and walking. She completed a bilateral Synvisc series on 6/2/17 with minimal relief. 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 She reports no relief from previous cortisone injections. She notes no relief from Tylenol. She is requesting stronger pain medicine. She was previously seen for a recent right foot injury. She fell at home on a slippery wet floor 5/23/2018. She reports striking her right second toe upon falling. She was seen at [facility] where x rays were read as showing a second toe proximal phalanx fracture by the ED eric. Post op wooden shoe was provided. She reports pain and swelling since the injury--especially with ambulation. Pain Assessment  7/23/2018 Location of Pain Knee Pain Location Comment - Location Modifiers Left;Right Severity of Pain 10 Quality of Pain Throbbing;Aching Duration of Pain Persistent Frequency of Pain Constant Aggravating Factors Walking;Standing;Bending Aggravating Factors Comment - Limiting Behavior Yes Relieving Factors Nothing Result of Injury No  
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 She has high blood pressure. She is not diabetic.   She reports that she has lost 15 pounds recently by cutting back on greasy foods and drinking more water. Current weight is 197 pounds. She is 4'10\" tall. She lives alone in [place]. She says that her 60-year-old daughter lives across the street. She has a 28-year-old son who lives in [place], and she has a 28-year-old son who is in the [employer]. Her son in the [employer] is stationed in [place] and will return to the [facility] in October. Her oldest son has three children and her daughter has five children. She walks regularly for exercise. She is allergic to latex. Weight Metrics 7/23/2018 7/12/2018 6/27/2018 6/20/2018 6/19/2018 6/19/2018 6/19/2018 Weight 200 lb 9.6 oz 200 lb 201 lb 9.6 oz 209 lb - 195 lb -  
BMI 41.93 kg/m2 41.8 kg/m2 42.13 kg/m2 - 43.68 kg/m2 - 40.76 kg/m2 Patient Active Problem List  
Diagnosis Code  Acute pharyngitis J02.9  Obesity (BMI 35.0-39.9 without comorbidity) E66.9  BMI 38.0-38.9,adult Z68.38  BMI 40.0-44.9, adult ([facility] Z68.41  Symptomatic anemia D64.9 REVIEW OF SYSTEMS: All Below are Negative except: See HPI Constitutional: negative for fever, chills, and weight loss. Cardiovascular: negative for chest pain, claudication, leg swelling, SOB, FLORES Gastrointestinal: Negative for pain, N/V/C/D, Blood in stool or urine, dysuria,  hematuria, incontinence, pelvic pain. Musculoskeletal: See HPI Neurological: Negative for dizziness and weakness. Negative for headaches, Visual changes, confusion, seizures Phychiatric/Behavioral: Negative for depression, memory loss, substance  abuse. Extremities: Negative for hair changes, rash, or skin lesion changes. Hematologic: Negative for bleeding problems, bruising, pallor or swollen lymph  nodes Peripheral Vascular: No calf pain, no circulation deficits. Social History Social History  Marital status: SINGLE   Spouse name: N/A  
 Number of children: N/A  
 Years of education: N/A Occupational History  Not on file. Social History Main Topics  Smoking status: Current Every Day Smoker Packs/day: 0.25 Years: 15.00  Smokeless tobacco: Never Used  Alcohol use Yes Comment: occasionally  Drug use: No  
 Sexual activity: Not on file Other Topics Concern  Not on file Social History Narrative Allergies Allergen Reactions  Latex Rash  Penicillins Other (comments) Current Outpatient Prescriptions Medication Sig  
 acetaminophen (TYLENOL ARTHRITIS PAIN) 650 mg TbER Take 650 mg by mouth every eight (8) hours.  omeprazole (PRILOSEC) 20 mg capsule Take 20 mg by mouth daily.  ferrous sulfate (IRON) 325 mg (65 mg iron) EC tablet Take 1 Tab by mouth two (2) times daily (with meals).  cetirizine (ZYRTEC) 10 mg tablet Take 1 Tab by mouth daily.  HYDROcodone-acetaminophen (NORCO) 5-325 mg per tablet Take 1-2 tablets PO every 4-6 hours as needed for pain control. If over the counter ibuprofen or acetaminophen was suggested, then only take the vicodin for pain not well controlled with the over the counter medication.  acetaminophen-codeine (TYLENOL-CODEINE #3) 300-30 mg per tablet Take 1 Tab by mouth every six (6) hours as needed for Pain. Max Daily Amount: 4 Tabs.  ondansetron (ZOFRAN ODT) 4 mg disintegrating tablet Take 1 Tab by mouth every eight (8) hours as needed for Nausea.  traMADol (ULTRAM) 50 mg tablet Take 1 Tab by mouth nightly as needed for Pain. Max Daily Amount: 50 mg. Indications: Pain  meloxicam (MOBIC) 7.5 mg tablet TAKE ONE TABLET BY MOUTH TWICE DAILY WITH MEALS  colchicine 0.6 mg tablet Take 1 Tab by mouth daily. Take 3 times daily on day 1. Then twice daily on days two and three. No current facility-administered medications for this visit. PHYSICAL EXAMINATION: 
Visit Vitals  /75  Pulse 85  Temp 97.8 °F (36.6 °C) (Oral)  Resp 18  Ht 4' 10\" (1.473 m)  Wt 200 lb 9.6 oz (91 kg)  SpO2 99%  BMI 41.93 kg/m2 ORTHO EXAMINATION: 
Examination Right knee Left knee Skin Intact Intact Range of motion 100-0 100-0 Effusion + - Medial joint line tenderness + + Lateral joint line tenderness - - Popliteal tenderness - +, fullness Osteophytes palpable + + Rajnis - - Patella crepitus + + Anterior drawer - - Lateral laxity - - Medial laxity - - Varus deformity +, mild -  
Valgus deformity - +, mild Pretibial edema - - Calf tenderness - - Examination Right Ankle/Foot Left Ankle/Foot Skin Intact Intact Swelling Base of 2nd toe  -  
Dorsiflexion 10 10 Plantarflexion 25 25 Deformity - - Inversion laxity - - Anterior drawer - - Medial tenderness - - Lateral tenderness - - Heel cord Intact Intact Sensation Intact Intact Bunion - - Toe nails Normal Normal  
Capillary refill Normal Normal  
 
Examination Left Ankle/Foot Right Ankle/Foot Skin Intact Intact Swelling - + second toe Dorsiflexion 10 10 Plantarflexion 25 25 Deformity - - Inversion laxity - - Anterior drawer - - Medial tenderness - - Lateral tenderness - - Heel cord Intact Intact Sensation Intact Intact Bunion - - Toe nails Normal Normal  
Capillary refill Normal Normal  
tenderness/swelling over base of 2nd toe PROCEDURE: After timeout and under sterile conditions, right knee aspirated 20 cc of bloody fluid. The fluid was discarded. After discussing treatment options, patient's right knee  was injected with 4 cc Marcaine and 1/2 cc Celestone. Chart reviewed for the following: 
 Kianna Espinoza MD, have reviewed the History, Physical and updated the Allergic reactions for [facility] TIME OUT performed immediately prior to start of procedure: 
Kianna Espinoza MD, have performed the following reviews on [facility] prior to the start of the procedure: 
         
* Patient was identified by name and date of birth * Agreement on procedure being performed was verified * Risks and Benefits explained to the patient * Procedure site verified and marked as necessary * Patient was positioned for comfort * Consent was obtained Time: 3:23 PM  
 
Date of procedure: 7/23/2018 Procedure performed by:  Edward Jesus MD 
 
Ms. Rose tolerated the procedure well with no complications. RADIOGRAPHS: 
XR RT FOOT 5/23/2018 IMPRESSION 1. No definite acute fracture. Likely old fracture of the second proximal 
phalanx. Correlate clinically for point tenderness to exclude acuity. 
-I have independently reviewed these images during this office visit. -Dr. Coates Forward IMPRESSION:  Three views - small minimally displaced oblique fracture of 2nd phalynx, no bunion deformity, no heel spur. XR LT FOOT 6/8/2016 IMPRESSION: 
1. No evidence of acute fracture. 2. Mild soft tissue swelling at the distal aspect of the foot. 3. Degenerative changes, as above. XR MICHAELLE KNEES 5/26/15 IMPRESSION:  No fractures, no effusion, near complete medial joint space narrowing on the right, near complete lateral joint space narrowing on the left, varus right osteophytes present. XR LEFT KNEE 3/3/15 IMPRESSION:  Moderate suprapatellar joint effusion. XR RIGHT KNEE 7/3/13 IMPRESSION:  Moderate changes of osteoarthritis. Large suprapatellar effusion. IMPRESSION:   
  ICD-10-CM ICD-9-CM 1. Primary osteoarthritis of right knee M17.11 715.16 betamethasone (CELESTONE SOLUSPAN) 6 mg/mL injection BETAMETHASONE ACETATE & SODIUM PHOSPHATE INJECTION 3 MG EA.  
   DRAIN/INJECT LARGE JOINT/BURSA  
   bupivacaine, PF, (MARCAINE, PF,) 0.25 % (2.5 mg/mL) injection  
   bupivacaine, PF, (MARCAINE, PF,) 0.25 % (2.5 mg/mL) injection DRAIN/INJECT LARGE JOINT/BURSA 2. Chronic pain of both knees M25.561 719.46   
 M25.562 338.29 G89.29 3. Primary osteoarthritis of left knee M17.12 715.16   
4.  Knee effusion, right M25.461 719.06 betamethasone (CELESTONE SOLUSPAN) 6 mg/mL injection BETAMETHASONE ACETATE & SODIUM PHOSPHATE INJECTION 3 MG EA.  
   DRAIN/INJECT LARGE JOINT/BURSA  
   bupivacaine, PF, (MARCAINE, PF,) 0.25 % (2.5 mg/mL) injection  
   bupivacaine, PF, (MARCAINE, PF,) 0.25 % (2.5 mg/mL) injection DRAIN/INJECT LARGE JOINT/BURSA PLAN:  After timeout and under sterile conditions, right knee aspirated 20 cc of bloody fluid. The fluid was discarded. After discussing treatment options, patient's right knee was injected with 4 cc Marcaine and 1/2 cc Celestone. We discussed possible need for right knee arthroplasty at some time in the future if pain continues pending weight loss to #180. She will follow up as needed.   
 
Scribed by Rony Merchant [facility]) as dictated by Lenora Guerrero MD

## 2018-08-23 ENCOUNTER — HOSPITAL ENCOUNTER (EMERGENCY)
Age: 53
Discharge: HOME | End: 2018-08-23
Attending: EMERGENCY MEDICINE
Payer: SELF-PAY

## 2018-08-23 VITALS
SYSTOLIC BLOOD PRESSURE: 131 MMHG | RESPIRATION RATE: 13 BRPM | TEMPERATURE: 98.4 F | DIASTOLIC BLOOD PRESSURE: 76 MMHG | OXYGEN SATURATION: 99 % | WEIGHT: 199 LBS | BODY MASS INDEX: 41.77 KG/M2 | HEIGHT: 58 IN | HEART RATE: 70 BPM

## 2018-08-23 DIAGNOSIS — R42 LIGHT HEADED: ICD-10-CM

## 2018-08-23 DIAGNOSIS — H61.21 IMPACTED CERUMEN OF RIGHT EAR: Primary | ICD-10-CM

## 2018-08-23 DIAGNOSIS — R09.81 SINUS CONGESTION: ICD-10-CM

## 2018-08-23 LAB
ALBUMIN SERPL-MCNC: 3.1 G/DL (ref 3.4–5)
ALBUMIN/GLOB SERPL: 0.8 {RATIO} (ref 0.8–1.7)
ALP SERPL-CCNC: 69 U/L (ref 45–117)
ALT SERPL-CCNC: 21 U/L (ref 13–56)
ANION GAP SERPL CALC-SCNC: 13 MMOL/L (ref 3–18)
AST SERPL-CCNC: 19 U/L (ref 15–37)
ATRIAL RATE: 74 BPM
BASOPHILS # BLD: 0 K/UL (ref 0–0.1)
BASOPHILS NFR BLD: 0 % (ref 0–2)
BILIRUB SERPL-MCNC: 0.2 MG/DL (ref 0.2–1)
BUN SERPL-MCNC: 10 MG/DL (ref 7–18)
BUN/CREAT SERPL: 13 (ref 12–20)
CALCIUM SERPL-MCNC: 8.5 MG/DL (ref 8.5–10.1)
CALCULATED P AXIS, ECG09: 49 DEGREES
CALCULATED R AXIS, ECG10: 7 DEGREES
CALCULATED T AXIS, ECG11: 65 DEGREES
CHLORIDE SERPL-SCNC: 103 MMOL/L (ref 100–108)
CK MB CFR SERPL CALC: NORMAL % (ref 0–4)
CK MB SERPL-MCNC: <1 NG/ML (ref 5–25)
CK SERPL-CCNC: 56 U/L (ref 26–192)
CO2 SERPL-SCNC: 23 MMOL/L (ref 21–32)
CREAT SERPL-MCNC: 0.77 MG/DL (ref 0.6–1.3)
DIAGNOSIS, 93000: NORMAL
DIFFERENTIAL METHOD BLD: ABNORMAL
EOSINOPHIL # BLD: 0.1 K/UL (ref 0–0.4)
EOSINOPHIL NFR BLD: 2 % (ref 0–5)
ERYTHROCYTE [DISTWIDTH] IN BLOOD BY AUTOMATED COUNT: 19.4 % (ref 11.6–14.5)
GLOBULIN SER CALC-MCNC: 4 G/DL (ref 2–4)
GLUCOSE SERPL-MCNC: 175 MG/DL (ref 74–99)
HCT VFR BLD AUTO: 35 % (ref 35–45)
HGB BLD-MCNC: 11.4 G/DL (ref 12–16)
LYMPHOCYTES # BLD: 1.9 K/UL (ref 0.9–3.6)
LYMPHOCYTES NFR BLD: 42 % (ref 21–52)
MCH RBC QN AUTO: 26.5 PG (ref 24–34)
MCHC RBC AUTO-ENTMCNC: 32.6 G/DL (ref 31–37)
MCV RBC AUTO: 81.4 FL (ref 74–97)
MONOCYTES # BLD: 0.3 K/UL (ref 0.05–1.2)
MONOCYTES NFR BLD: 6 % (ref 3–10)
NEUTS SEG # BLD: 2.3 K/UL (ref 1.8–8)
NEUTS SEG NFR BLD: 50 % (ref 40–73)
P-R INTERVAL, ECG05: 172 MS
PLATELET # BLD AUTO: 259 K/UL (ref 135–420)
PMV BLD AUTO: 9.5 FL (ref 9.2–11.8)
POTASSIUM SERPL-SCNC: 3.6 MMOL/L (ref 3.5–5.5)
PROT SERPL-MCNC: 7.1 G/DL (ref 6.4–8.2)
Q-T INTERVAL, ECG07: 416 MS
QRS DURATION, ECG06: 86 MS
QTC CALCULATION (BEZET), ECG08: 461 MS
RBC # BLD AUTO: 4.3 M/UL (ref 4.2–5.3)
SODIUM SERPL-SCNC: 139 MMOL/L (ref 136–145)
TROPONIN I SERPL-MCNC: <0.02 NG/ML (ref 0–0.06)
VENTRICULAR RATE, ECG03: 74 BPM
WBC # BLD AUTO: 4.6 K/UL (ref 4.6–13.2)

## 2018-08-23 PROCEDURE — 80053 COMPREHEN METABOLIC PANEL: CPT | Performed by: EMERGENCY MEDICINE

## 2018-08-23 PROCEDURE — 85025 COMPLETE CBC W/AUTO DIFF WBC: CPT | Performed by: EMERGENCY MEDICINE

## 2018-08-23 PROCEDURE — 99284 EMERGENCY DEPT VISIT MOD MDM: CPT

## 2018-08-23 PROCEDURE — 82550 ASSAY OF CK (CPK): CPT | Performed by: EMERGENCY MEDICINE

## 2018-08-23 PROCEDURE — 93005 ELECTROCARDIOGRAM TRACING: CPT

## 2018-08-23 RX ORDER — FLUTICASONE PROPIONATE 50 MCG
2 SPRAY, SUSPENSION (ML) NASAL DAILY
Qty: 1 BOTTLE | Refills: 0 | Status: SHIPPED | OUTPATIENT
Start: 2018-08-23 | End: 2019-06-02

## 2018-08-23 NOTE — ED NOTES
I have reviewed discharge instructions with the patient. The patient verbalized understanding. Patient armband removed and shredded  Current Discharge Medication List      START taking these medications    Details   carbamide peroxide (DEBROX) 6.5 % otic solution Administer 10 Drops in right ear two (2) times a day. Qty: 30 mL, Refills: 0      fluticasone (FLONASE) 50 mcg/actuation nasal spray 2 Sprays by Both Nostrils route daily. Qty: 1 Bottle, Refills: 0         CONTINUE these medications which have NOT CHANGED    Details   omeprazole (PRILOSEC) 20 mg capsule Take 20 mg by mouth daily. ferrous sulfate (IRON) 325 mg (65 mg iron) EC tablet Take 1 Tab by mouth two (2) times daily (with meals). Qty: 60 Tab, Refills: 1      acetaminophen (TYLENOL ARTHRITIS PAIN) 650 mg TbER Take 650 mg by mouth every eight (8) hours. cetirizine (ZYRTEC) 10 mg tablet Take 1 Tab by mouth daily. Qty: 14 Tab, Refills: 0      HYDROcodone-acetaminophen (NORCO) 5-325 mg per tablet Take 1-2 tablets PO every 4-6 hours as needed for pain control. If over the counter ibuprofen or acetaminophen was suggested, then only take the vicodin for pain not well controlled with the over the counter medication. Qty: 6 Tab, Refills: 0    Associated Diagnoses: Closed nondisplaced fracture of proximal phalanx of lesser toe of right foot, initial encounter      acetaminophen-codeine (TYLENOL-CODEINE #3) 300-30 mg per tablet Take 1 Tab by mouth every six (6) hours as needed for Pain. Max Daily Amount: 4 Tabs. Qty: 15 Tab, Refills: 0      ondansetron (ZOFRAN ODT) 4 mg disintegrating tablet Take 1 Tab by mouth every eight (8) hours as needed for Nausea. Qty: 14 Tab, Refills: 0      traMADol (ULTRAM) 50 mg tablet Take 1 Tab by mouth nightly as needed for Pain. Max Daily Amount: 50 mg.  Indications: Pain  Qty: 14 Tab, Refills: 2    Comments: [place] Regulation 18V OY67-32-63 dated March 15, 2017, states, \" acute pain\" medication shall not be prescribed by a medical provider with a short acting opioid for more than 7 days. meloxicam (MOBIC) 7.5 mg tablet TAKE ONE TABLET BY MOUTH TWICE DAILY WITH MEALS  Qty: 60 Tab, Refills: 0    Associated Diagnoses: Chronic pain of both knees      colchicine 0.6 mg tablet Take 1 Tab by mouth daily. Take 3 times daily on day 1. Then twice daily on days two and three.   Qty: 7 Tab, Refills: 0

## 2018-08-23 NOTE — DISCHARGE INSTRUCTIONS
Earwax Blockage: Care Instructions  Your Care Instructions    Earwax is a natural substance that protects the ear canal. Normally, earwax drains from the ears and does not cause problems. Sometimes earwax builds up and hardens. Earwax blockage (also called cerumen impaction) can cause some loss of hearing and pain. When wax is tightly packed, you will need to have your doctor remove it. Follow-up care is a key part of your treatment and safety. Be sure to make and go to all appointments, and call your doctor if you are having problems. It's also a good idea to know your test results and keep a list of the medicines you take. How can you care for yourself at home? · Do not try to remove earwax with cotton swabs, fingers, or other objects. This can make the blockage worse and damage the eardrum. · If your doctor recommends that you try to remove earwax at home:  ¨ Soften and loosen the earwax with warm mineral oil. You also can try hydrogen peroxide mixed with an equal amount of room temperature water. Place 2 drops of the fluid, warmed to body temperature, in the ear two times a day for up to 5 days. ¨ Once the wax is loose and soft, all that is usually needed to remove it from the ear canal is a gentle, warm shower. Direct the water into the ear, then tip your head to let the earwax drain out. Dry your ear thoroughly with a hair dryer set on low. Hold the dryer several inches from your ear. ¨ If the warm mineral oil and shower do not work, use an over-the-counter wax softener. Read and follow all instructions on the label. After using the wax softener, use an ear syringe to gently flush the ear. Make sure the flushing solution is body temperature. Cool or hot fluids in the ear can cause dizziness. When should you call for help? Call your doctor now or seek immediate medical care if:    · Pus or blood drains from your ear.     · Your ears are ringing or feel full.     · You have a loss of hearing.  Watch closely for changes in your health, and be sure to contact your doctor if:    · You have pain or reduced hearing after 1 week of home treatment.     · You have any new symptoms, such as nausea or balance problems. Where can you learn more? Go to http://macy-ana m.info/. Enter H504 in the search box to learn more about \"Earwax Blockage: Care Instructions. \"  Current as of: November 20, 2017  Content Version: 11.7  © 2330-3903 [employer]. Care instructions adapted under license by [employer] (which disclaims liability or warranty for this information). If you have questions about a medical condition or this instruction, always ask your healthcare professional. [address] any warranty or liability for your use of this information. Dizziness: Care Instructions  Your Care Instructions  Dizziness is the feeling of unsteadiness or fuzziness in your head. It is different than having vertigo, which is a feeling that the room is spinning or that you are moving or falling. It is also different from lightheadedness, which is the feeling that you are about to faint. It can be hard to know what causes dizziness. Some people feel dizzy when they have migraine headaches. Sometimes bouts of flu can make you feel dizzy. Some medical conditions, such as heart problems or high blood pressure, can make you feel dizzy. Many medicines can cause dizziness, including medicines for high blood pressure, pain, or anxiety. If a medicine causes your symptoms, your doctor may recommend that you stop or change the medicine. If it is a problem with your heart, you may need medicine to help your heart work better. If there is no clear reason for your symptoms, your doctor may suggest watching and waiting for a while to see if the dizziness goes away on its own. Follow-up care is a key part of your treatment and safety.  Be sure to make and go to all appointments, and call your doctor if you are having problems. It's also a good idea to know your test results and keep a list of the medicines you take. How can you care for yourself at home? · If your doctor recommends or prescribes medicine, take it exactly as directed. Call your doctor if you think you are having a problem with your medicine. · Do not drive while you feel dizzy. · Try to prevent falls. Steps you can take include:  ¨ Using nonskid mats, adding grab bars near the tub, and using night-lights. ¨ Clearing your home so that walkways are free of anything you might trip on. ¨ Letting family and friends know that you have been feeling dizzy. This will help them know how to help you. When should you call for help? Call 911 anytime you think you may need emergency care. For example, call if:    · You passed out (lost consciousness).     · You have dizziness along with symptoms of a heart attack. These may include:  ¨ Chest pain or pressure, or a strange feeling in the chest.  ¨ Sweating. ¨ Shortness of breath. ¨ Nausea or vomiting. ¨ Pain, pressure, or a strange feeling in the back, neck, jaw, or upper belly or in one or both shoulders or arms. ¨ Lightheadedness or sudden weakness. ¨ A fast or irregular heartbeat.     · You have symptoms of a stroke. These may include:  ¨ Sudden numbness, tingling, weakness, or loss of movement in your face, arm, or leg, especially on only one side of your body. ¨ Sudden vision changes. ¨ Sudden trouble speaking. ¨ Sudden confusion or trouble understanding simple statements. ¨ Sudden problems with walking or balance.   ¨ A sudden, severe headache that is different from past headaches.    Call your doctor now or seek immediate medical care if:    · You feel dizzy and have a fever, headache, or ringing in your ears.     · You have new or increased nausea and vomiting.     · Your dizziness does not go away or comes back.    Watch closely for changes in your health, and be sure to contact your doctor if:    · You do not get better as expected. Where can you learn more? Go to http://macy-ana m.info/. Enter M815 in the search box to learn more about \"Dizziness: Care Instructions. \"  Current as of: November 20, 2017  Content Version: 11.7  © 5335-0161 [employer]. Care instructions adapted under license by [employer] (which disclaims liability or warranty for this information). If you have questions about a medical condition or this instruction, always ask your healthcare professional. [address] any warranty or liability for your use of this information.

## 2018-08-23 NOTE — ED PROVIDER NOTES
EMERGENCY DEPARTMENT HISTORY AND PHYSICAL EXAM    Date: 8/23/2018  Patient Name: Roderic Gottron    History of Presenting Illness     Chief Complaint   Patient presents with    Dizziness         History Provided By: Patient    Chief Complaint: light-headed  Duration: 1 Months  Timing:  Gradual and Intermittent  Location: right ear, head  Quality: Pressure  Severity: N/A  Modifying Factors: change in positions makes symptoms worse  Associated Symptoms: right ear clogged, sinus pressure      HPI: Roderic Gottron is a 50 y.o. female with a PMH of anemia, GI ulcer, osteoarthritis who presents to the ER c/o intermittent light-headedness. Patient states her symptoms have been intermittent x 1 month. She has had some associated right ear congestion and states it feels like her right sinuses are clogged. She reports when she changes position, she has momentary light headedness that resolves fairly quickly. She denied any reports of pain. She has not used any antihistamines. There are no other symptoms or complaints. PCP: Rich Dudley MD    Current Outpatient Prescriptions   Medication Sig Dispense Refill    carbamide peroxide (DEBROX) 6.5 % otic solution Administer 10 Drops in right ear two (2) times a day. 30 mL 0    fluticasone (FLONASE) 50 mcg/actuation nasal spray 2 Sprays by Both Nostrils route daily. 1 Bottle 0    omeprazole (PRILOSEC) 20 mg capsule Take 20 mg by mouth daily.  ferrous sulfate (IRON) 325 mg (65 mg iron) EC tablet Take 1 Tab by mouth two (2) times daily (with meals). 60 Tab 1    acetaminophen (TYLENOL ARTHRITIS PAIN) 650 mg TbER Take 650 mg by mouth every eight (8) hours.  cetirizine (ZYRTEC) 10 mg tablet Take 1 Tab by mouth daily. 14 Tab 0    HYDROcodone-acetaminophen (NORCO) 5-325 mg per tablet Take 1-2 tablets PO every 4-6 hours as needed for pain control.   If over the counter ibuprofen or acetaminophen was suggested, then only take the vicodin for pain not well controlled with the over the counter medication. 6 Tab 0    acetaminophen-codeine (TYLENOL-CODEINE #3) 300-30 mg per tablet Take 1 Tab by mouth every six (6) hours as needed for Pain. Max Daily Amount: 4 Tabs. 15 Tab 0    ondansetron (ZOFRAN ODT) 4 mg disintegrating tablet Take 1 Tab by mouth every eight (8) hours as needed for Nausea. 14 Tab 0    traMADol (ULTRAM) 50 mg tablet Take 1 Tab by mouth nightly as needed for Pain. Max Daily Amount: 50 mg. Indications: Pain 14 Tab 2    meloxicam (MOBIC) 7.5 mg tablet TAKE ONE TABLET BY MOUTH TWICE DAILY WITH MEALS 60 Tab 0    colchicine 0.6 mg tablet Take 1 Tab by mouth daily. Take 3 times daily on day 1. Then twice daily on days two and three. 7 Tab 0       Past History     Past Medical History:  Past Medical History:   Diagnosis Date    BMI 38.0-38.9,adult 5/19/2017    BMI 40.0-44.9, adult ([facility] [address] 5/19/2017    Obesity (BMI 35.0-39.9 without comorbidity) 4/10/2017    Osteoarthritis     Ulcer     gi ulcer       Past Surgical History:  Past Surgical History:   Procedure Laterality Date    ABDOMEN SURGERY PROC UNLISTED      hernia    HX GYN      D&C       Family History:  Family History   Problem Relation Age of Onset    Arthritis-osteo Other        Social History:  Social History   Substance Use Topics    Smoking status: Current Every Day Smoker     Packs/day: 0.25     Years: 15.00    Smokeless tobacco: Never Used    Alcohol use Yes      Comment: occasionally       Allergies: Allergies   Allergen Reactions    Latex Rash    Penicillins Other (comments)         Review of Systems   Review of Systems   Constitutional: Negative for chills, fatigue and fever. HENT: Positive for congestion. Negative for sore throat. Eyes: Negative. Respiratory: Negative for cough and shortness of breath. Cardiovascular: Negative for chest pain and palpitations. Gastrointestinal: Negative for abdominal pain, nausea and vomiting. Genitourinary: Negative for dysuria. Musculoskeletal: Negative. Skin: Negative. Neurological: Positive for light-headedness. Negative for dizziness, weakness and headaches. Psychiatric/Behavioral: Negative. All other systems reviewed and are negative. Physical Exam     Vitals:    08/23/18 1251 08/23/18 1335 08/23/18 1430   BP: 156/78 134/68 148/73   Pulse: 80 75 74   Resp: 18 14 18   Temp: 98.4 °F (36.9 °C)     SpO2: 98% 97% 99%   Weight: 90.3 kg (199 lb)     Height: 4' 10\" (1.473 m)       Physical Exam   Constitutional: She is oriented to person, place, and time. She appears well-developed and well-nourished. No distress. HENT:   Head: Normocephalic and atraumatic. Right Ear: External ear normal.   Left Ear: Tympanic membrane, external ear and ear canal normal.   Nose: Mucosal edema present. Right sinus exhibits maxillary sinus tenderness. Right sinus exhibits no frontal sinus tenderness. Left sinus exhibits no maxillary sinus tenderness and no frontal sinus tenderness. Mouth/Throat: Uvula is midline, oropharynx is clear and moist and mucous membranes are normal.   Impacted cerumen on right side; unable to visualize TM   Eyes: Conjunctivae and EOM are normal. Pupils are equal, round, and reactive to light. No scleral icterus. Neck: Neck supple. No JVD present. No tracheal deviation present. Cardiovascular: Normal rate, regular rhythm and normal heart sounds. Pulmonary/Chest: Effort normal and breath sounds normal. No respiratory distress. She has no wheezes. Abdominal: Soft. There is no tenderness. Musculoskeletal: Normal range of motion. Neurological: She is alert and oriented to person, place, and time. She has normal strength. Gait normal. GCS eye subscore is 4. GCS verbal subscore is 5. GCS motor subscore is 6. Skin: Skin is warm and dry. She is not diaphoretic. Psychiatric: She has a normal mood and affect. Nursing note and vitals reviewed.         Diagnostic Study Results     Labs -     Recent Results (from the past 12 hour(s))   EKG, 12 LEAD, INITIAL    Collection Time: 08/23/18  1:11 PM   Result Value Ref Range    Ventricular Rate 74 BPM    Atrial Rate 74 BPM    P-R Interval 172 ms    QRS Duration 86 ms    Q-T Interval 416 ms    QTC Calculation (Bezet) 461 ms    Calculated P Axis 49 degrees    Calculated R Axis 7 degrees    Calculated T Axis 65 degrees    Diagnosis       Normal sinus rhythm  Inferior infarct , age undetermined  Abnormal ECG  When compared with ECG of 19-JUN-2018 02:50,  Inferior infarct is now present     CBC WITH AUTOMATED DIFF    Collection Time: 08/23/18  1:15 PM   Result Value Ref Range    WBC 4.6 4.6 - 13.2 K/uL    RBC 4.30 4.20 - 5.30 M/uL    HGB 11.4 (L) 12.0 - 16.0 g/dL    HCT 35.0 35.0 - 45.0 %    MCV 81.4 74.0 - 97.0 FL    MCH 26.5 24.0 - 34.0 PG    MCHC 32.6 31.0 - 37.0 g/dL    RDW 19.4 (H) 11.6 - 14.5 %    PLATELET 933 333 - 565 K/uL    MPV 9.5 9.2 - 11.8 FL    NEUTROPHILS 50 40 - 73 %    LYMPHOCYTES 42 21 - 52 %    MONOCYTES 6 3 - 10 %    EOSINOPHILS 2 0 - 5 %    BASOPHILS 0 0 - 2 %    ABS. NEUTROPHILS 2.3 1.8 - 8.0 K/UL    ABS. LYMPHOCYTES 1.9 0.9 - 3.6 K/UL    ABS. MONOCYTES 0.3 0.05 - 1.2 K/UL    ABS. EOSINOPHILS 0.1 0.0 - 0.4 K/UL    ABS. BASOPHILS 0.0 0.0 - 0.1 K/UL    DF AUTOMATED     METABOLIC PANEL, COMPREHENSIVE    Collection Time: 08/23/18  1:15 PM   Result Value Ref Range    Sodium 139 136 - 145 mmol/L    Potassium 3.6 3.5 - 5.5 mmol/L    Chloride 103 100 - 108 mmol/L    CO2 23 21 - 32 mmol/L    Anion gap 13 3.0 - 18 mmol/L    Glucose 175 (H) 74 - 99 mg/dL    BUN 10 7.0 - 18 MG/DL    Creatinine 0.77 0.6 - 1.3 MG/DL    BUN/Creatinine ratio 13 12 - 20      GFR est AA >60 >60 ml/min/1.73m2    GFR est non-AA >60 >60 ml/min/1.73m2    Calcium 8.5 8.5 - 10.1 MG/DL    Bilirubin, total 0.2 0.2 - 1.0 MG/DL    ALT (SGPT) 21 13 - 56 U/L    AST (SGOT) 19 15 - 37 U/L    Alk.  phosphatase 69 45 - 117 U/L    Protein, total 7.1 6.4 - 8.2 g/dL    Albumin 3.1 (L) 3.4 - 5.0 g/dL    Globulin 4.0 2.0 - 4.0 g/dL    A-G Ratio 0.8 0.8 - 1.7     CARDIAC PANEL,(CK, CKMB & TROPONIN)    Collection Time: 08/23/18  1:15 PM   Result Value Ref Range    CK 56 26 - 192 U/L    CK - MB <1.0 <3.6 ng/ml    CK-MB Index  0.0 - 4.0 %     CALCULATION NOT PERFORMED WHEN RESULT IS BELOW LINEAR LIMIT    Troponin-I, Qt. <0.02 0.00 - 0.06 NG/ML       Radiologic Studies -   No orders to display     CT Results  (Last 48 hours)    None        CXR Results  (Last 48 hours)    None            Medical Decision Making   I am the first provider for this patient. I reviewed the vital signs, available nursing notes, past medical history, past surgical history, family history and social history. Vital Signs-Reviewed the patient's vital signs. Records Reviewed: Nursing Notes, Old Medical Records and Previous Laboratory Studies    ED Course:   2:57 PM  49 y/o female c/o intermittent light-headedness onset x 1 month. Pt reports associated right sided nasal congestion and feeling like her right ear is congested. Hx of symptomatic anemia requiring blood transfusion 2 months prior. States these symptoms feel different. Labs reassuring. Pt noted to have cerumen build up in right ear and increased sinus congestion on right side. Will plan on prescription for debrox and nasal spray and ENT follow up. All questions answered and patient in agreement with plan of care. Will plan for discharge. Misbah Damon PA-C    Disposition:  Discharged    DISCHARGE NOTE:       Care plan outlined and precautions discussed. Patient has no new complaints, changes, or physical findings. Results of labs were reviewed with the patient. All medications were reviewed with the patient; will d/c home with debrox and fluticasone. All of pt's questions and concerns were addressed. Patient was instructed and agrees to follow up with ENT and PCP, as well as to return to the ED upon further deterioration. Patient is ready to go home.     Follow-up Information Follow up With Details Comments Contact Info    [facility]  If symptoms worsen [address] [facility]    Todd Guzmán MD Call in 1 day As needed for ER follow up [address] [facility] Corryvladislav Knox MD Call in 1 week If symptoms worsen Jajavladislavlos Durant  [facility]  [phone]            Current Discharge Medication List      START taking these medications    Details   carbamide peroxide (DEBROX) 6.5 % otic solution Administer 10 Drops in right ear two (2) times a day. Qty: 30 mL, Refills: 0      fluticasone (FLONASE) 50 mcg/actuation nasal spray 2 Sprays by Both Nostrils route daily. Qty: 1 Bottle, Refills: 0         CONTINUE these medications which have NOT CHANGED    Details   omeprazole (PRILOSEC) 20 mg capsule Take 20 mg by mouth daily. ferrous sulfate (IRON) 325 mg (65 mg iron) EC tablet Take 1 Tab by mouth two (2) times daily (with meals). Qty: 60 Tab, Refills: 1      acetaminophen (TYLENOL ARTHRITIS PAIN) 650 mg TbER Take 650 mg by mouth every eight (8) hours. cetirizine (ZYRTEC) 10 mg tablet Take 1 Tab by mouth daily. Qty: 14 Tab, Refills: 0      HYDROcodone-acetaminophen (NORCO) 5-325 mg per tablet Take 1-2 tablets PO every 4-6 hours as needed for pain control. If over the counter ibuprofen or acetaminophen was suggested, then only take the vicodin for pain not well controlled with the over the counter medication. Qty: 6 Tab, Refills: 0    Associated Diagnoses: Closed nondisplaced fracture of proximal phalanx of lesser toe of right foot, initial encounter      acetaminophen-codeine (TYLENOL-CODEINE #3) 300-30 mg per tablet Take 1 Tab by mouth every six (6) hours as needed for Pain. Max Daily Amount: 4 Tabs. Qty: 15 Tab, Refills: 0      ondansetron (ZOFRAN ODT) 4 mg disintegrating tablet Take 1 Tab by mouth every eight (8) hours as needed for Nausea.   Qty: 14 Tab, Refills: 0      traMADol (ULTRAM) 50 mg tablet Take 1 Tab by mouth nightly as needed for Pain. Max Daily Amount: 50 mg. Indications: Pain  Qty: 14 Tab, Refills: 2    Comments: [place] Regulation 18V KU99-85-32 dated March 15, 2017, states, \" acute pain\" medication shall not be prescribed by a medical provider with a short acting opioid for more than 7 days. meloxicam (MOBIC) 7.5 mg tablet TAKE ONE TABLET BY MOUTH TWICE DAILY WITH MEALS  Qty: 60 Tab, Refills: 0    Associated Diagnoses: Chronic pain of both knees      colchicine 0.6 mg tablet Take 1 Tab by mouth daily. Take 3 times daily on day 1. Then twice daily on days two and three. Qty: 7 Tab, Refills: 0             Provider Notes (Medical Decision Making):     Procedures:  Procedures        Diagnosis     Clinical Impression:   1. Impacted cerumen of right ear    2. Sinus congestion    3.  Light headed

## 2018-08-23 NOTE — ED NOTES
Verbal shift change report given to Dav Martínez RN (oncoming nurse) by Tad Craig RN (offgoing nurse). Report included the following information SBAR.

## 2018-08-23 NOTE — ED TRIAGE NOTES
Pt c/o dizziness & states she feels lightheaded. Pt states she had a blood transfusion in June for low iron. Pt c/o positional dizziness.

## 2018-12-26 ENCOUNTER — APPOINTMENT (OUTPATIENT)
Dept: GENERAL RADIOLOGY | Age: 53
End: 2018-12-26
Attending: EMERGENCY MEDICINE
Payer: SELF-PAY

## 2018-12-26 ENCOUNTER — HOSPITAL ENCOUNTER (EMERGENCY)
Age: 53
Discharge: HOME | End: 2018-12-26
Attending: EMERGENCY MEDICINE
Payer: SELF-PAY

## 2018-12-26 VITALS
BODY MASS INDEX: 44.94 KG/M2 | SYSTOLIC BLOOD PRESSURE: 144 MMHG | TEMPERATURE: 99 F | OXYGEN SATURATION: 98 % | HEART RATE: 90 BPM | WEIGHT: 215 LBS | DIASTOLIC BLOOD PRESSURE: 85 MMHG | RESPIRATION RATE: 16 BRPM

## 2018-12-26 DIAGNOSIS — M25.552 PAIN OF LEFT HIP JOINT: Primary | ICD-10-CM

## 2018-12-26 PROCEDURE — 99283 EMERGENCY DEPT VISIT LOW MDM: CPT

## 2018-12-26 PROCEDURE — 73502 X-RAY EXAM HIP UNI 2-3 VIEWS: CPT

## 2018-12-26 PROCEDURE — 74011250637 HC RX REV CODE- 250/637: Performed by: EMERGENCY MEDICINE

## 2018-12-26 RX ORDER — ACETAMINOPHEN AND CODEINE PHOSPHATE 300; 30 MG/1; MG/1
1 TABLET ORAL
Qty: 12 TAB | Refills: 0 | Status: SHIPPED | OUTPATIENT
Start: 2018-12-26 | End: 2019-06-02

## 2018-12-26 RX ORDER — ACETAMINOPHEN 500 MG
1000 TABLET ORAL
Status: COMPLETED | OUTPATIENT
Start: 2018-12-26 | End: 2018-12-26

## 2018-12-26 RX ADMIN — ACETAMINOPHEN 1000 MG: 500 TABLET ORAL at 00:36

## 2018-12-26 NOTE — ED PROVIDER NOTES
EMERGENCY DEPARTMENT HISTORY AND PHYSICAL EXAM    12:30 AM      Date: 12/26/2018  Patient Name: Douglas Neumann    History of Presenting Illness     Chief Complaint   Patient presents with    Hip Pain         History Provided By: Patient    Chief Complaint: Hip pain  Duration:  1 week   Timing:  Worsening  Location: Left hip radiating down leg  Quality: Not obtained at this time   Severity: Moderate  Modifying Factors: Took Tylenol but felt no relief   Associated Symptoms: denies any other associated signs or symptoms      Additional History (Context): 12:39 AM Douglas Neumann is a 48 y.o. female with h/o ulcer, osteoarthritis, obesity and BMI who presents to ED complaining of worsening moderate left hip pain, radiating down her leg onset 1 week ago. The pt states that she thinks her pain was caused by a twisting of her hip while at work. Took Tylenol, but felt no relief. She indicates she can't sit without feeling pain. Denies falling. PCP: Sulaiman Sotelo MD        Current Outpatient Medications   Medication Sig Dispense Refill    acetaminophen-codeine (TYLENOL-CODEINE #3) 300-30 mg per tablet Take 1 Tab by mouth every four (4) hours as needed for Pain. Max Daily Amount: 6 Tabs. 12 Tab 0    carbamide peroxide (DEBROX) 6.5 % otic solution Administer 10 Drops in right ear two (2) times a day. 30 mL 0    fluticasone (FLONASE) 50 mcg/actuation nasal spray 2 Sprays by Both Nostrils route daily. 1 Bottle 0    acetaminophen (TYLENOL ARTHRITIS PAIN) 650 mg TbER Take 650 mg by mouth every eight (8) hours.  cetirizine (ZYRTEC) 10 mg tablet Take 1 Tab by mouth daily. 14 Tab 0    HYDROcodone-acetaminophen (NORCO) 5-325 mg per tablet Take 1-2 tablets PO every 4-6 hours as needed for pain control. If over the counter ibuprofen or acetaminophen was suggested, then only take the vicodin for pain not well controlled with the over the counter medication.  6 Tab 0    ondansetron (ZOFRAN ODT) 4 mg disintegrating tablet Take 1 Tab by mouth every eight (8) hours as needed for Nausea. 14 Tab 0    traMADol (ULTRAM) 50 mg tablet Take 1 Tab by mouth nightly as needed for Pain. Max Daily Amount: 50 mg. Indications: Pain 14 Tab 2    meloxicam (MOBIC) 7.5 mg tablet TAKE ONE TABLET BY MOUTH TWICE DAILY WITH MEALS 60 Tab 0    colchicine 0.6 mg tablet Take 1 Tab by mouth daily. Take 3 times daily on day 1. Then twice daily on days two and three. 7 Tab 0    omeprazole (PRILOSEC) 20 mg capsule Take 20 mg by mouth daily.  ferrous sulfate (IRON) 325 mg (65 mg iron) EC tablet Take 1 Tab by mouth two (2) times daily (with meals). 61 Tab 1       Past History     Past Medical History:  Past Medical History:   Diagnosis Date    BMI 38.0-38.9,adult 5/19/2017    BMI 40.0-44.9, adult ([facility] [address] 5/19/2017    Obesity (BMI 35.0-39.9 without comorbidity) 4/10/2017    Osteoarthritis     Ulcer     gi ulcer       Past Surgical History:  Past Surgical History:   Procedure Laterality Date    ABDOMEN SURGERY PROC UNLISTED      hernia    HX GYN      D&C       Family History:  Family History   Problem Relation Age of Onset    Arthritis-osteo Other        Social History:  Social History     Tobacco Use    Smoking status: Current Every Day Smoker     Packs/day: 0.25     Years: 15.00     Pack years: 3.75    Smokeless tobacco: Never Used   Substance Use Topics    Alcohol use: Yes     Comment: occasionally    Drug use: No       Allergies: Allergies   Allergen Reactions    Latex Rash    Penicillins Other (comments)         Review of Systems     Review of Systems   Constitutional: Negative. Negative for chills, diaphoresis and fever. HENT: Negative. Negative for congestion, rhinorrhea and sore throat. Eyes: Negative. Negative for pain, discharge and redness. Respiratory: Negative. Negative for cough, chest tightness, shortness of breath and wheezing. Cardiovascular: Negative. Negative for chest pain. Gastrointestinal: Negative. Negative for abdominal pain, constipation, diarrhea, nausea and vomiting. Genitourinary: Negative. Negative for dysuria, flank pain, frequency, hematuria and urgency. Musculoskeletal: Negative. Negative for back pain and neck pain. Left hip pain    Skin: Negative. Negative for rash. Neurological: Negative. Negative for syncope, weakness, numbness and headaches. Psychiatric/Behavioral: Negative. All other systems reviewed and are negative. Physical Exam     Visit Vitals  /85 (BP 1 Location: Left arm, BP Patient Position: At rest)   Pulse 90   Temp 99 °F (37.2 °C)   Resp 16   Wt 97.5 kg (215 lb)   SpO2 98%   BMI 44.94 kg/m²     Physical Exam   Constitutional: She is oriented to person, place, and time. She appears well-developed and well-nourished. Non-toxic appearance. She does not have a sickly appearance. She does not appear ill. No distress. HENT:   Head: Normocephalic and atraumatic. Mouth/Throat: Oropharynx is clear and moist. No oropharyngeal exudate. Eyes: Conjunctivae and EOM are normal. Pupils are equal, round, and reactive to light. No scleral icterus. Neck: Normal range of motion. Neck supple. No hepatojugular reflux and no JVD present. No tracheal deviation present. No thyromegaly present. Cardiovascular: Normal rate, regular rhythm, S1 normal, S2 normal, normal heart sounds, intact distal pulses and normal pulses. Exam reveals no gallop, no S3 and no S4. No murmur heard. Pulses:       Radial pulses are 2+ on the right side, and 2+ on the left side. Dorsalis pedis pulses are 2+ on the right side, and 2+ on the left side. Pulmonary/Chest: Effort normal and breath sounds normal. No respiratory distress. She has no decreased breath sounds. She has no wheezes. She has no rhonchi. She has no rales. Abdominal: Soft. Normal appearance and bowel sounds are normal. She exhibits no distension and no mass. There is no hepatosplenomegaly.  There is no tenderness. There is no rigidity, no rebound, no guarding, no CVA tenderness, no tenderness at McBurney's point and negative Brizuela's sign. Musculoskeletal: Normal range of motion. She exhibits no edema. Left hip: She exhibits tenderness. She exhibits normal range of motion, normal strength, no bony tenderness, no swelling, no crepitus, no deformity and no laceration. Legs:  Lymphadenopathy:        Head (right side): No submental, no submandibular, no preauricular and no occipital adenopathy present. Head (left side): No submental, no submandibular, no preauricular and no occipital adenopathy present. She has no cervical adenopathy. Right: No supraclavicular adenopathy present. Left: No supraclavicular adenopathy present. Neurological: She is alert and oriented to person, place, and time. She has normal strength and normal reflexes. She is not disoriented. No cranial nerve deficit or sensory deficit. Coordination and gait normal. GCS eye subscore is 4. GCS verbal subscore is 5. GCS motor subscore is 6. Grossly intact    Skin: Skin is warm, dry and intact. No rash noted. She is not diaphoretic. Psychiatric: She has a normal mood and affect. Her speech is normal and behavior is normal. Judgment and thought content normal. Cognition and memory are normal.   Nursing note and vitals reviewed. Diagnostic Study Results     Labs -  No results found for this or any previous visit (from the past 12 hour(s)). Radiologic Studies -   XR HIP LT W OR WO PELV 2-3 VWS    (Results Pending)         Medical Decision Making   Provider Notes (Medical Decision Making):  MDM  Number of Diagnoses or Management Options  Pain of left hip joint:   Diagnosis management comments: Hip fracture vs strain  Sciatica  Arthritis        I am the first provider for this patient.     I reviewed the vital signs, available nursing notes, past medical history, past surgical history, family history and social history. Vital Signs-Reviewed the patient's vital signs. Records Reviewed: Nursing Notes (Time of Review: 12:30 AM)    ED Course: Progress Notes, Reevaluation, and Consults:    Left Hip Xray showed no acute processing. 12:30 AM 12/26/2018      Diagnosis       I have reassessed the patient. Patient is feeling better. Patient will be prescribed Tylenol-Codiene. Patient was discharged in stable condition. Patient is to return to emergency department if any new or worsening condition. Clinical Impression:   1. Pain of left hip joint        Disposition: D/C    Follow-up Information     Follow up With Specialties Details Why Contact Info    Saturnino Paez MD Orthopedic Surgery  As needed [facility] and Spine Specialist [facility] [address]  [phone]             _______________________________    Attestations:  Scribe Attestation     I-Sarah López acting as a scribe for and in the presence of Garima Garcia DO      December 26, 2018 at 12:30 AM       Provider Attestation:      I personally performed the services described in the documentation, reviewed the documentation, as recorded by the scribe in my presence, and it accurately and completely records my words and actions.  December 26, 2018 at 12:30 AM - Garima Garcia DO    _______________________________

## 2019-02-13 ENCOUNTER — OFFICE VISIT (OUTPATIENT)
Dept: ORTHOPEDIC SURGERY | Facility: CLINIC | Age: 54
End: 2019-02-13

## 2019-02-13 VITALS
HEIGHT: 58 IN | SYSTOLIC BLOOD PRESSURE: 152 MMHG | BODY MASS INDEX: 44.71 KG/M2 | WEIGHT: 213 LBS | HEART RATE: 76 BPM | TEMPERATURE: 97.3 F | RESPIRATION RATE: 16 BRPM | OXYGEN SATURATION: 99 % | DIASTOLIC BLOOD PRESSURE: 79 MMHG

## 2019-02-13 DIAGNOSIS — R52 PAIN: Primary | ICD-10-CM

## 2019-02-13 DIAGNOSIS — M25.662 DECREASED RANGE OF MOTION (ROM) OF BOTH KNEES: ICD-10-CM

## 2019-02-13 DIAGNOSIS — M25.661 DECREASED RANGE OF MOTION (ROM) OF BOTH KNEES: ICD-10-CM

## 2019-02-13 DIAGNOSIS — M17.0 PRIMARY OSTEOARTHRITIS OF BOTH KNEES: ICD-10-CM

## 2019-02-13 DIAGNOSIS — R52 ACUTE PAIN: ICD-10-CM

## 2019-02-13 RX ORDER — TRAMADOL HYDROCHLORIDE 50 MG/1
50-100 TABLET ORAL
Qty: 28 TAB | Refills: 0 | Status: SHIPPED | OUTPATIENT
Start: 2019-02-13 | End: 2019-06-02

## 2019-02-13 RX ORDER — TRIAMCINOLONE ACETONIDE 40 MG/ML
40 INJECTION, SUSPENSION INTRA-ARTICULAR; INTRAMUSCULAR ONCE
Qty: 1 ML | Refills: 0
Start: 2019-02-13 | End: 2019-02-13

## 2019-02-13 NOTE — PROGRESS NOTES
HISTORY OF PRESENT ILLNESS:  Tiffanie Youngblood is a pleasant, 21-year-old, obese,  female who returns to the office complaining of bilateral knee pain, right greater than left. She has a history of bilateral knee pain dating back many years and has been under the care of Dr. Margaret Melissa. Now that Dr. Margaret Melissa has retired, she has seen Dr. Barbara Back times one visit and was not happy with his plan of care. Today, she complains of bilateral knee pain that limits her ability to stand for a short period and walk short distances. 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 She indicates her bilateral knee osteoarthritis is hereditary. Primarily, the right side is painful on the inner portion of the knee and on the left side, it is on the outer portion of the knee. REVIEW OF SYSTEMS:  No chest pain. Exertional dyspnea, heavy type. No fevers, chills, or night sweats. No rash and no itching. No nausea and no vomiting. She is not a diabetic. She is not allergic to Betadine. ALLERGIES:  LATEX, PENICILLINS. PHYSICAL EXAM:  She is a healthy-appearing, 21-year-old, morbidly obese, 410, 213-pound, BMI 44.52,  female, atraumatic, normocephalic, alert and oriented times three, sitting on the table comfortably. She is in shorts. Examination to the right knee reveals on full extension, she has a varus deformity. The left knee at full extension reveals a valgus deformity. She has pain associated tricompartmentally of both knees with crepitation through ranging and the patella tracking midline symmetrically. Valgus stressing increases pain on the left knee and varus stressing increases pain of the right knee. There is no calf tenderness or evidence of DVT. She walks with a moderately antalgic gait limping favoring the right side. Distal sensation is intact fully to the bilateral lower extremities.      RADIOGRAPHS:  X-rays today, three views of the bilateral knees, reveal bone-on-bone contact of the medial joint space of the right knee, as well as near-severe patellofemoral disease of the right knee. Mild osteoarthritic changes are seen to the lateral compartment. Of the left knee, there are patellofemoral changes as well moderate in characteristic, osteoarthritic, and the lateral joint space is moderately narrow. There is minimal narrowing of the medial joint space. IMPRESSION:      1. Bilateral knee osteoarthritis right greater symptomatically than the left. Right associated with total collapse of the medial joint space and left, very close contact if not early contact of the lateral joint space with both knees affected by moderate patellofemoral disease. 2. Obesity with a BMI of 44.52, height 410, and weight 213.   3. History of anemia with a blood transfusion. The patient cannot tolerate NSAIDs. PROCEDURE:  Today, under sterile technique, after verbal and written consent were obtained and appropriate time out performed, with the patients knee flexed at 20° on  a bolster, 3 cc of 1% Lidocaine was used to anesthetize the right knee using the superolateral, intraarticular approach. To follow, 11 cc of straw-colored, blood-tinted aspirate was removed from the same portal.  To follow, 1 mL of Kenalog at 40 mg per mL mixed with 10 mL of Sensorcaine 0.75% was injected. There were no complications. The patient was then sat up and with her left knee flexed at 90° 1 cc of Kenalog mixed with 7 mL of Sensorcaine 0.75 was injected using the anteromedial intraarticular approach. The patient tolerated the procedures well. PLAN:   I am currently recommending aspiration and injection of her right knee today. She, on further discussion, reveals that she did not do well with viscosupplementation provided by Dr. Yazmin Abdul last year. I would like to trial her again on GELSYN.   She is a self-pay patient, and she was given information on the Care Card today. We will plan on seeing her back in about one month. I did discuss with Dr. Elliott Right her severe problems with her right knee, and he indicated that he would certainly be willing to see her as soon as she is ready. Today, all her questions were answered to her satisfaction. A copy of her x-rays was reviewed and provided.

## 2019-03-15 ENCOUNTER — OFFICE VISIT (OUTPATIENT)
Dept: ORTHOPEDIC SURGERY | Facility: CLINIC | Age: 54
End: 2019-03-15

## 2019-03-15 ENCOUNTER — TELEPHONE (OUTPATIENT)
Dept: ORTHOPEDIC SURGERY | Facility: CLINIC | Age: 54
End: 2019-03-15

## 2019-03-15 VITALS
BODY MASS INDEX: 43.24 KG/M2 | HEIGHT: 58 IN | DIASTOLIC BLOOD PRESSURE: 89 MMHG | TEMPERATURE: 98.7 F | OXYGEN SATURATION: 100 % | WEIGHT: 206 LBS | HEART RATE: 83 BPM | SYSTOLIC BLOOD PRESSURE: 147 MMHG | RESPIRATION RATE: 16 BRPM

## 2019-03-15 DIAGNOSIS — M25.561 ACUTE PAIN OF RIGHT KNEE: Primary | ICD-10-CM

## 2019-03-15 DIAGNOSIS — M17.0 PRIMARY OSTEOARTHRITIS OF BOTH KNEES: ICD-10-CM

## 2019-03-15 DIAGNOSIS — R52 PAIN: ICD-10-CM

## 2019-03-15 RX ORDER — TRAMADOL HYDROCHLORIDE 50 MG/1
50 TABLET ORAL
Qty: 28 TAB | Refills: 2 | Status: SHIPPED | OUTPATIENT
Start: 2019-03-15 | End: 2019-03-29

## 2019-03-15 NOTE — TELEPHONE ENCOUNTER
[facility] called in requesting to know if the patient is getting the tramadol for a chronic or acute issue. They can be reached at [phone].

## 2019-03-15 NOTE — TELEPHONE ENCOUNTER
Spoke with [address] and let them know the Rx is for acute pain. The pharmacy let me know that she is only allowed 7 pills. Per GINA Fall, that is ok.

## 2019-04-22 ENCOUNTER — OFFICE VISIT (OUTPATIENT)
Dept: ORTHOPEDIC SURGERY | Facility: CLINIC | Age: 54
End: 2019-04-22

## 2019-04-22 VITALS
OXYGEN SATURATION: 100 % | TEMPERATURE: 98.1 F | WEIGHT: 214.6 LBS | HEART RATE: 81 BPM | SYSTOLIC BLOOD PRESSURE: 143 MMHG | HEIGHT: 58 IN | RESPIRATION RATE: 18 BRPM | DIASTOLIC BLOOD PRESSURE: 77 MMHG | BODY MASS INDEX: 45.05 KG/M2

## 2019-04-22 DIAGNOSIS — M17.11 PRIMARY OSTEOARTHRITIS OF RIGHT KNEE: Primary | ICD-10-CM

## 2019-04-22 RX ORDER — TRIAMCINOLONE ACETONIDE 40 MG/ML
40 INJECTION, SUSPENSION INTRA-ARTICULAR; INTRAMUSCULAR ONCE
Qty: 1 ML | Refills: 0
Start: 2019-04-22 | End: 2019-04-22

## 2019-04-22 NOTE — LETTER
NOTIFICATION RETURN TO WORK  
 
4/22/2019 Ms. Yessy Burns [address] 
[place] [address] To Whom It May Concern: 
 
Yessy Burns is currently under the care of [address] [place] [address]. She was seen in our office today and will return to work 4/23/2019. If there are questions or concerns please have the patient contact our office.  
 
 
 
Sincerely, 
 
 
Chris Faustin PA-C

## 2019-04-22 NOTE — PROGRESS NOTES
HISTORY OF PRESENT ILLNESS:  Paradise Taylor is a pleasant, 70-year-old, obese,  female who returns to the office complaining of bilateral knee pain, right greater than left. She has a history of bilateral knee pain dating back many years and has been under the care of Dr. Gumaro Goodwin. Now that Dr. Gumaro Goodwin has retired, she has seen Dr. Roxana Roger times one visit and was not happy with his plan of care. Today, she complains of bilateral knee pain that limits her ability to stand for a short period and walk short distances. 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 She indicates her bilateral knee osteoarthritis is hereditary. Primarily, the right side is painful on the inner portion of the knee and on the left side, it is on the outer portion of the knee. Her last aspiration was over 2 months ago REVIEW OF SYSTEMS:  No chest pain. Exertional dyspnea, heavy type. No fevers, chills, or night sweats. No rash and no itching. No nausea and no vomiting. She is not a diabetic. She is not allergic to Betadine. Pain right knee 7/10 at rest and with activity ALLERGIES:  LATEX, PENICILLINS. PHYSICAL EXAM:  She is a healthy-appearing, 70-year-old, morbidly obese, 410, 213-pound, BMI 44.52,  female, atraumatic, normocephalic, alert and oriented times three, sitting on the table comfortably. She is in shorts. Examination to the right knee reveals on full extension, she has a varus deformity. The left knee at full extension reveals a valgus deformity. She has pain associated tricompartmentally of both knees with crepitation through ranging and the patella tracking midline symmetrically. Valgus stressing increases pain on the left knee and varus stressing increases pain of the right knee. There is no calf tenderness or evidence of DVT. She walks with a moderately antalgic gait limping favoring the right side.   Distal sensation is intact fully to the bilateral lower extremities. RADIOGRAPHS:  Previous imaging , three views of the bilateral knees, reveal bone-on-bone contact of the medial joint space of the right knee, as well as near-severe patellofemoral disease of the right knee. Mild osteoarthritic changes are seen to the lateral compartment. Of the left knee, there are patellofemoral changes as well moderate in characteristic, osteoarthritic, and the lateral joint space is moderately narrow. There is minimal narrowing of the medial joint space. IMPRESSION:   
 
1. Bilateral knee osteoarthritis right greater symptomatically than the left. Right associated with total collapse of the medial joint space and left, very close contact if not early contact of the lateral joint space with both knees affected by moderate patellofemoral disease. 2. Obesity with a BMI of 44.85, height 410, and weight 213.  
3. History of anemia with a blood transfusion. The patient cannot tolerate NSAIDs. PROCEDURE:  Today, under sterile technique, after verbal and written consent were obtained and appropriate time out performed, with the patients knee flexed at 20° on  a bolster, 3 cc of 1% Lidocaine was used to anesthetize the right knee using the superolateral, intraarticular approach. To follow, 12 cc of straw-colored, blood-tinted aspirate was removed from the same portal.  To follow, 1 mL of Kenalog at 40 mg per mL mixed with 10 mL of Sensorcaine 0.75% was injected. There were no complications. PLAN:   I am currently recommending aspiration and injection of her right knee today. She, on further discussion, reveals that she did not do well with viscosupplementation provided by Dr. Evelina Gallardo last year. I would like to trial her again on GELSYN. She is a self-pay patient, and she was given information on the Care Card today.   We will plan on seeing her back in about one month. I did discuss with Dr. Gay Pandey her severe problems with her right knee, and he indicated that he would certainly be willing to see her as soon as she is ready. Today, all her questions were answered to her satisfaction. A copy of her x-rays was reviewed and provided.

## 2019-06-02 ENCOUNTER — HOSPITAL ENCOUNTER (EMERGENCY)
Age: 54
Discharge: HOME | End: 2019-06-02
Attending: EMERGENCY MEDICINE | Admitting: EMERGENCY MEDICINE
Payer: SELF-PAY

## 2019-06-02 VITALS
OXYGEN SATURATION: 98 % | HEIGHT: 58 IN | DIASTOLIC BLOOD PRESSURE: 89 MMHG | TEMPERATURE: 99.5 F | RESPIRATION RATE: 20 BRPM | HEART RATE: 86 BPM | BODY MASS INDEX: 41.98 KG/M2 | WEIGHT: 200 LBS | SYSTOLIC BLOOD PRESSURE: 157 MMHG

## 2019-06-02 DIAGNOSIS — S46.912A MUSCLE STRAIN OF LEFT UPPER ARM, INITIAL ENCOUNTER: Primary | ICD-10-CM

## 2019-06-02 PROCEDURE — 99282 EMERGENCY DEPT VISIT SF MDM: CPT

## 2019-06-02 RX ORDER — CYCLOBENZAPRINE HCL 10 MG
10 TABLET ORAL
Qty: 15 TAB | Refills: 0 | Status: SHIPPED | OUTPATIENT
Start: 2019-06-02 | End: 2022-07-07 | Stop reason: SDUPTHER

## 2019-06-02 NOTE — ED PROVIDER NOTES
EMERGENCY DEPARTMENT HISTORY AND PHYSICAL EXAM    Date: 6/2/2019  Patient Name: Audrey Castillo    History of Presenting Illness     Chief Complaint   Patient presents with    Arm Pain         History Provided By: patient    Chief Complaint: arm pain  Duration: 1 month, worsening x 1 week  Timing: acute  Location: L upper arm  Quality aching  Severity: moderate  Modifying Factors: tylenol and biofreeze did not help  Associated Symptoms:swelling      Additional History (Context): Audrey Castillo is a 48 y.o. female with PMH OA and obesitywho presents with c/o pain to the left upper arm for the past month. Patient states the pain is been worsening of the past week. 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 Patient states she thinks she may have pulled a muscle in her arm. Treatment with Tylenol and Biofreeze has not alleviated the symptoms. No other complaints at this time. PCP: Familia Hale MD    Current Outpatient Medications   Medication Sig Dispense Refill    cyclobenzaprine (FLEXERIL) 10 mg tablet Take 1 Tab by mouth three (3) times daily as needed for Muscle Spasm(s).  15 Tab 0       Past History     Past Medical History:  Past Medical History:   Diagnosis Date    BMI 38.0-38.9,adult 5/19/2017    BMI 40.0-44.9, adult ([address] 5/19/2017    Obesity (BMI 35.0-39.9 without comorbidity) 4/10/2017    Osteoarthritis     Ulcer     gi ulcer       Past Surgical History:  Past Surgical History:   Procedure Laterality Date    ABDOMEN SURGERY PROC UNLISTED      hernia    HX GYN      D&C       Family History:  Family History   Problem Relation Age of Onset    Arthritis-osteo Other        Social History:  Social History     Tobacco Use    Smoking status: Current Every Day Smoker     Packs/day: 0.25     Years: 15.00     Pack years: 3.75    Smokeless tobacco: Never Used   Substance Use Topics    Alcohol use: Yes     Comment: occasionally    Drug use: No       Allergies: Allergies   Allergen Reactions    Latex Rash    Penicillins Other (comments)         Review of Systems   Review of Systems   Constitutional: Negative. Negative for chills and fever. HENT: Negative. Negative for congestion, ear pain and rhinorrhea. Eyes: Negative. Negative for pain and redness. Respiratory: Negative. Negative for cough, shortness of breath, wheezing and stridor. Cardiovascular: Negative. Negative for chest pain and leg swelling. Gastrointestinal: Negative. Negative for abdominal pain, constipation, diarrhea, nausea and vomiting. Genitourinary: Negative. Negative for dysuria and frequency. Musculoskeletal: Positive for myalgias. Negative for back pain and neck pain. Skin: Negative. Negative for rash and wound. Neurological: Negative. Negative for dizziness, seizures, syncope and headaches. All other systems reviewed and are negative. All Other Systems Negative  Physical Exam     Vitals:    06/02/19 1321   BP: 157/89   Pulse: 86   Resp: 20   Temp: 99.5 °F (37.5 °C)   SpO2: 98%   Weight: 90.7 kg (200 lb)   Height: 4' 10\" (1.473 m)     Physical Exam   Constitutional: She is oriented to person, place, and time. She appears well-developed and well-nourished. No distress. HENT:   Head: Normocephalic and atraumatic. Eyes: Conjunctivae are normal. Right eye exhibits no discharge. Left eye exhibits no discharge. No scleral icterus. Neck: Normal range of motion. Neck supple. Cardiovascular: Normal rate, regular rhythm and normal heart sounds. Exam reveals no gallop and no friction rub. No murmur heard. Pulmonary/Chest: Effort normal and breath sounds normal. No stridor. No respiratory distress. She has no wheezes. She has no rales. Musculoskeletal: Normal range of motion. She exhibits tenderness. She exhibits no edema or deformity.    LUE: intact pulses, no edema or deformity noted, TTP noted diffusely to the triceps region, ROM of the extremity intact, no bony TTP noted   Neurological: She is alert and oriented to person, place, and time. Coordination normal.   Gait is steady. Able to ambulate without difficulty. Skin: Skin is warm and dry. No rash noted. She is not diaphoretic. No erythema. Psychiatric: She has a normal mood and affect. Her behavior is normal. Thought content normal.   Nursing note and vitals reviewed. Diagnostic Study Results     Labs -   No results found for this or any previous visit (from the past 12 hour(s)). Radiologic Studies -   No orders to display     CT Results  (Last 48 hours)    None        CXR Results  (Last 48 hours)    None            Medical Decision Making   I am the first provider for this patient. I reviewed the vital signs, available nursing notes, past medical history, past surgical history, family history and social history. Vital Signs-Reviewed the patient's vital signs. Records Reviewed: Char Joshi PA-C     Procedures:  Procedures    Provider Notes (Medical Decision Making): Impression:  Arm strain    Clinical presentation suggestive of muscle strain, will plan to d/c with flexeril and PCP follow-up as pt cannot take NSAIDs. She agrees with this plan. Char Joshi PA-C 2:22 PM     MED RECONCILIATION:  No current facility-administered medications for this encounter. Current Outpatient Medications   Medication Sig    cyclobenzaprine (FLEXERIL) 10 mg tablet Take 1 Tab by mouth three (3) times daily as needed for Muscle Spasm(s). Disposition:  D/c    DISCHARGE NOTE:   Patient is stable for discharge at this time. Rx for flexeril given. Rest and follow-up with PCP this week. Return to the ED immediately for any new or worsening sx.   Char Joshi PA-C 2:22 PM     Follow-up Information     Follow up With Specialties Details Why Contact Info    Tom Kim MD Internal Medicine Schedule an appointment as soon as possible for a visit in 1 week [address] [facility] [id number]  [facility] EMERGENCY DEPT Emergency Medicine  As needed, If symptoms worsen [address] [facility]  [phone]          Discharge Medication List as of 6/2/2019  2:12 PM      START taking these medications    Details   cyclobenzaprine (FLEXERIL) 10 mg tablet Take 1 Tab by mouth three (3) times daily as needed for Muscle Spasm(s). , Print, Disp-15 Tab, R-0               Diagnosis     Clinical Impression:   1.  Muscle strain of left upper arm, initial encounter

## 2019-06-02 NOTE — DISCHARGE INSTRUCTIONS
[facility] Activation    Thank you for requesting access to [facility]. Please follow the instructions below to securely access and download your online medical record. [facility] allows you to send messages to your doctor, view your test results, renew your prescriptions, schedule appointments, and more. How Do I Sign Up? 1. In your internet browser, go to [email or web address]  2. Click on the First Time User? Click Here link in the Sign In box. You will be redirect to the New Member Sign Up page. 3. Enter your [facility] Access Code exactly as it appears below. You will not need to use this code after youve completed the sign-up process. If you do not sign up before the expiration date, you must request a new code. [facility] Access Code: [email or web address]  Expires: 2019  8:51 AM (This is the date your [facility]izzabo access code will )    4. Enter the last four digits of your Social Security Number (xxxx) and Date of Birth (mm/dd/yyyy) as indicated and click Submit. You will be taken to the next sign-up page. 5. Create a Bizzabo ID. This will be your [facility] login ID and cannot be changed, so think of one that is secure and easy to remember. 6. Create a [facility]izzabo password. You can change your password at any time. 7. Enter your Password Reset Question and Answer. This can be used at a later time if you forget your password. 8. Enter your e-mail address. You will receive e-mail notification when new information is available in [address]. 9. Click Sign Up. You can now view and download portions of your medical record. 10. Click the Download Summary menu link to download a portable copy of your medical information. Additional Information    If you have questions, please visit the Frequently Asked Questions section of the Bizzabo website at [email or web address] Remember, [employer]izzabo is NOT to be used for urgent needs. For medical emergencies, dial 911.        Complete all medications as prescribed. Follow-up with primary care doctor in 1 week. Return to the ED immediately for any new or worsening symptoms.

## 2019-06-02 NOTE — LETTER
NOTIFICATION OF RETURN TO WORK / SCHOOL 
6/2/2019 Ms. [place]ina [place] [address] To Whom It May Concern: 
 
Elina Singer was seen in the ED on 6/2/19 and may be excused from work for 3 days. Sincerely, Char Joshi PA-C

## 2019-06-02 NOTE — ED NOTES
Patient armband removed and shreddedI have reviewed discharge instructions with the patient. The patient verbalized understanding.  rx x 1 and work note given;

## 2019-06-27 ENCOUNTER — OFFICE VISIT (OUTPATIENT)
Dept: ORTHOPEDIC SURGERY | Facility: CLINIC | Age: 54
End: 2019-06-27

## 2019-06-27 VITALS
HEART RATE: 92 BPM | OXYGEN SATURATION: 100 % | DIASTOLIC BLOOD PRESSURE: 87 MMHG | BODY MASS INDEX: 45.72 KG/M2 | RESPIRATION RATE: 18 BRPM | SYSTOLIC BLOOD PRESSURE: 137 MMHG | WEIGHT: 217.8 LBS | HEIGHT: 58 IN | TEMPERATURE: 98.3 F

## 2019-06-27 DIAGNOSIS — M25.561 CHRONIC PAIN OF BOTH KNEES: Primary | ICD-10-CM

## 2019-06-27 DIAGNOSIS — M17.0 PRIMARY OSTEOARTHRITIS OF BOTH KNEES: ICD-10-CM

## 2019-06-27 DIAGNOSIS — M25.562 CHRONIC PAIN OF BOTH KNEES: Primary | ICD-10-CM

## 2019-06-27 DIAGNOSIS — G89.29 CHRONIC PAIN OF BOTH KNEES: Primary | ICD-10-CM

## 2019-06-27 RX ORDER — DICLOFENAC SODIUM 10 MG/G
GEL TOPICAL 4 TIMES DAILY
Qty: 100 G | Refills: 3 | Status: SHIPPED | OUTPATIENT
Start: 2019-06-27 | End: 2022-10-23

## 2019-06-27 RX ORDER — LANOLIN ALCOHOL/MO/W.PET/CERES
CREAM (GRAM) TOPICAL
COMMUNITY

## 2019-06-27 NOTE — PROGRESS NOTES
HISTORY:  Sonya Cole returns to the office. She is complaining of persistent bilateral knee pain. She was supposed to see Dr. Adalberto Mane for a second opinion regarding knee replacement surgery. We did discuss her BMI today. It indicated that she is a little heavy with her BMI being at 45.52 and that the cutoff was 40. PLAN:  We are going to try Voltaren Gel to the bilateral knees for symptom management and get her an appointment at some point in the future with Dr. Adalberto Mane sometime in 07/2019. She did receive cortisone not just 2 months ago following an aspiration and it is premature to repeat any cortisone at this time. Consideration for hyaluronic acid viscosupplementation if the patient remains symptomatic and is deemed not a candidate at this time for knee replacement due to her weight. She was given a Voltaren Gel prescription today to use 4 g topically to the bilateral knees up to 4 times a day. All of her questions answered to her satisfaction.

## 2019-07-29 ENCOUNTER — HOSPITAL ENCOUNTER (OUTPATIENT)
Age: 54
Setting detail: OBSERVATION
LOS: 1 days | Discharge: HOME HEALTH | End: 2019-07-30
Attending: EMERGENCY MEDICINE | Admitting: INTERNAL MEDICINE
Payer: SELF-PAY

## 2019-07-29 DIAGNOSIS — E11.00 DIABETES MELLITUS WITH NONKETOTIC HYPEROSMOLARITY (HCC): ICD-10-CM

## 2019-07-29 DIAGNOSIS — E11.9 NEW ONSET TYPE 2 DIABETES MELLITUS (HCC): Primary | ICD-10-CM

## 2019-07-29 LAB
APPEARANCE UR: CLEAR
BILIRUB UR QL: NEGATIVE
COLOR UR: YELLOW
GLUCOSE BLD STRIP.AUTO-MCNC: >600 MG/DL (ref 70–110)
GLUCOSE UR STRIP.AUTO-MCNC: >1000 MG/DL
HGB UR QL STRIP: NEGATIVE
KETONES UR QL STRIP.AUTO: NEGATIVE MG/DL
LEUKOCYTE ESTERASE UR QL STRIP.AUTO: NEGATIVE
NITRITE UR QL STRIP.AUTO: NEGATIVE
PH UR STRIP: 5.5 [PH] (ref 5–8)
PROT UR STRIP-MCNC: NEGATIVE MG/DL
SP GR UR REFRACTOMETRY: >1.03 (ref 1–1.03)
UROBILINOGEN UR QL STRIP.AUTO: 0.2 EU/DL (ref 0.2–1)

## 2019-07-29 PROCEDURE — 85025 COMPLETE CBC W/AUTO DIFF WBC: CPT

## 2019-07-29 PROCEDURE — 83036 HEMOGLOBIN GLYCOSYLATED A1C: CPT

## 2019-07-29 PROCEDURE — 81003 URINALYSIS AUTO W/O SCOPE: CPT

## 2019-07-29 PROCEDURE — 96361 HYDRATE IV INFUSION ADD-ON: CPT

## 2019-07-29 PROCEDURE — 80053 COMPREHEN METABOLIC PANEL: CPT

## 2019-07-29 PROCEDURE — 99284 EMERGENCY DEPT VISIT MOD MDM: CPT

## 2019-07-29 PROCEDURE — 82962 GLUCOSE BLOOD TEST: CPT

## 2019-07-29 PROCEDURE — 94761 N-INVAS EAR/PLS OXIMETRY MLT: CPT

## 2019-07-29 PROCEDURE — 81025 URINE PREGNANCY TEST: CPT

## 2019-07-29 PROCEDURE — 74011250636 HC RX REV CODE- 250/636: Performed by: EMERGENCY MEDICINE

## 2019-07-29 PROCEDURE — 83735 ASSAY OF MAGNESIUM: CPT

## 2019-07-29 PROCEDURE — 77030037878 HC DRSG MEPILEX >48IN BORD MOLN -B

## 2019-07-29 RX ORDER — UREA 10 %
100 LOTION (ML) TOPICAL DAILY
COMMUNITY
End: 2022-10-23

## 2019-07-29 RX ADMIN — SODIUM CHLORIDE 1000 ML: 900 INJECTION, SOLUTION INTRAVENOUS at 23:50

## 2019-07-30 ENCOUNTER — HOME HEALTH ADMISSION (OUTPATIENT)
Dept: HOME HEALTH SERVICES | Facility: HOME HEALTH | Age: 54
End: 2019-07-30

## 2019-07-30 VITALS
OXYGEN SATURATION: 100 % | BODY MASS INDEX: 41.98 KG/M2 | HEIGHT: 58 IN | TEMPERATURE: 98.3 F | SYSTOLIC BLOOD PRESSURE: 142 MMHG | WEIGHT: 200 LBS | DIASTOLIC BLOOD PRESSURE: 83 MMHG | HEART RATE: 80 BPM | RESPIRATION RATE: 13 BRPM

## 2019-07-30 PROBLEM — I10 ESSENTIAL HYPERTENSION: Status: ACTIVE | Noted: 2019-07-30

## 2019-07-30 PROBLEM — N28.9 ACUTE KIDNEY INSUFFICIENCY: Status: ACTIVE | Noted: 2019-07-30

## 2019-07-30 PROBLEM — E11.9 NEW ONSET TYPE 2 DIABETES MELLITUS (HCC): Status: ACTIVE | Noted: 2019-07-30

## 2019-07-30 PROBLEM — E11.00 HYPEROSMOLAR NON-KETOTIC STATE IN PATIENT WITH TYPE 2 DIABETES MELLITUS (HCC): Status: ACTIVE | Noted: 2019-07-30

## 2019-07-30 LAB
25(OH)D3 SERPL-MCNC: 7.7 NG/ML (ref 30–100)
ADMINISTERED INITIALS, ADMINIT: NORMAL
ALBUMIN SERPL-MCNC: 3.3 G/DL (ref 3.4–5)
ALBUMIN SERPL-MCNC: 4.2 G/DL (ref 3.4–5)
ALBUMIN/GLOB SERPL: 0.9 {RATIO} (ref 0.8–1.7)
ALBUMIN/GLOB SERPL: 0.9 {RATIO} (ref 0.8–1.7)
ALP SERPL-CCNC: 132 U/L (ref 45–117)
ALP SERPL-CCNC: 97 U/L (ref 45–117)
ALT SERPL-CCNC: 26 U/L (ref 13–56)
ALT SERPL-CCNC: 36 U/L (ref 13–56)
ANION GAP SERPL CALC-SCNC: 11 MMOL/L (ref 3–18)
ANION GAP SERPL CALC-SCNC: 6 MMOL/L (ref 3–18)
ANION GAP SERPL CALC-SCNC: 7 MMOL/L (ref 3–18)
AST SERPL-CCNC: 12 U/L (ref 10–38)
AST SERPL-CCNC: 9 U/L (ref 10–38)
BASOPHILS # BLD: 0 K/UL (ref 0–0.1)
BASOPHILS NFR BLD: 0 % (ref 0–2)
BILIRUB SERPL-MCNC: 0.5 MG/DL (ref 0.2–1)
BILIRUB SERPL-MCNC: 0.9 MG/DL (ref 0.2–1)
BUN SERPL-MCNC: 11 MG/DL (ref 7–18)
BUN SERPL-MCNC: 12 MG/DL (ref 7–18)
BUN SERPL-MCNC: 9 MG/DL (ref 7–18)
BUN/CREAT SERPL: 10 (ref 12–20)
BUN/CREAT SERPL: 10 (ref 12–20)
BUN/CREAT SERPL: 8 (ref 12–20)
CALCIUM SERPL-MCNC: 10.2 MG/DL (ref 8.5–10.1)
CALCIUM SERPL-MCNC: 10.4 MG/DL (ref 8.5–10.1)
CALCIUM SERPL-MCNC: 8.7 MG/DL (ref 8.5–10.1)
CALCIUM SERPL-MCNC: 8.7 MG/DL (ref 8.5–10.1)
CHLORIDE SERPL-SCNC: 103 MMOL/L (ref 100–111)
CHLORIDE SERPL-SCNC: 105 MMOL/L (ref 100–111)
CHLORIDE SERPL-SCNC: 88 MMOL/L (ref 100–111)
CO2 SERPL-SCNC: 28 MMOL/L (ref 21–32)
CO2 SERPL-SCNC: 30 MMOL/L (ref 21–32)
CO2 SERPL-SCNC: 31 MMOL/L (ref 21–32)
CREAT SERPL-MCNC: 0.91 MG/DL (ref 0.6–1.3)
CREAT SERPL-MCNC: 1.15 MG/DL (ref 0.6–1.3)
CREAT SERPL-MCNC: 1.44 MG/DL (ref 0.6–1.3)
D50 ADMINISTERED, D50ADM: 0 ML
D50 ORDER, D50ORD: 0 ML
DIFFERENTIAL METHOD BLD: ABNORMAL
EOSINOPHIL # BLD: 0 K/UL (ref 0–0.4)
EOSINOPHIL NFR BLD: 0 % (ref 0–5)
ERYTHROCYTE [DISTWIDTH] IN BLOOD BY AUTOMATED COUNT: 13.5 % (ref 11.6–14.5)
ERYTHROCYTE [DISTWIDTH] IN BLOOD BY AUTOMATED COUNT: 13.6 % (ref 11.6–14.5)
EST. AVERAGE GLUCOSE BLD GHB EST-MCNC: 229 MG/DL
GLOBULIN SER CALC-MCNC: 3.5 G/DL (ref 2–4)
GLOBULIN SER CALC-MCNC: 4.9 G/DL (ref 2–4)
GLUCOSE BLD STRIP.AUTO-MCNC: 196 MG/DL (ref 70–110)
GLUCOSE BLD STRIP.AUTO-MCNC: 264 MG/DL (ref 70–110)
GLUCOSE BLD STRIP.AUTO-MCNC: 306 MG/DL (ref 70–110)
GLUCOSE BLD STRIP.AUTO-MCNC: 308 MG/DL (ref 70–110)
GLUCOSE BLD STRIP.AUTO-MCNC: 320 MG/DL (ref 70–110)
GLUCOSE BLD STRIP.AUTO-MCNC: 335 MG/DL (ref 70–110)
GLUCOSE BLD STRIP.AUTO-MCNC: 521 MG/DL (ref 70–110)
GLUCOSE BLD STRIP.AUTO-MCNC: >600 MG/DL (ref 70–110)
GLUCOSE BLD STRIP.AUTO-MCNC: >600 MG/DL (ref 70–110)
GLUCOSE SERPL-MCNC: 1198 MG/DL (ref 74–99)
GLUCOSE SERPL-MCNC: 293 MG/DL (ref 74–99)
GLUCOSE SERPL-MCNC: 348 MG/DL (ref 74–99)
GLUCOSE, GLC: 335 MG/DL
GLUCOSE, GLC: 521 MG/DL
GLUCOSE, GLC: 601 MG/DL
GLUCOSE, GLC: 602 MG/DL
HBA1C MFR BLD: 9.6 % (ref 4.2–5.6)
HCG UR QL: NEGATIVE
HCT VFR BLD AUTO: 36.6 % (ref 35–45)
HCT VFR BLD AUTO: 43.2 % (ref 35–45)
HGB BLD-MCNC: 12.7 G/DL (ref 12–16)
HGB BLD-MCNC: 15 G/DL (ref 12–16)
HIGH TARGET, HITG: 180 MG/DL
INSULIN ADMINSTERED, INSADM: 10.8 UNITS/HOUR
INSULIN ADMINSTERED, INSADM: 16.3 UNITS/HOUR
INSULIN ADMINSTERED, INSADM: 5.5 UNITS/HOUR
INSULIN ADMINSTERED, INSADM: 9.2 UNITS/HOUR
INSULIN ORDER, INSORD: 10.8 UNITS/HOUR
INSULIN ORDER, INSORD: 16.3 UNITS/HOUR
INSULIN ORDER, INSORD: 5.5 UNITS/HOUR
INSULIN ORDER, INSORD: 9.2 UNITS/HOUR
LOW TARGET, LOT: 140 MG/DL
LYMPHOCYTES # BLD: 1.5 K/UL (ref 0.9–3.6)
LYMPHOCYTES NFR BLD: 29 % (ref 21–52)
MAGNESIUM SERPL-MCNC: 2.8 MG/DL (ref 1.6–2.6)
MAGNESIUM SERPL-MCNC: 2.8 MG/DL (ref 1.6–2.6)
MCH RBC QN AUTO: 27.1 PG (ref 24–34)
MCH RBC QN AUTO: 27.1 PG (ref 24–34)
MCHC RBC AUTO-ENTMCNC: 34.7 G/DL (ref 31–37)
MCHC RBC AUTO-ENTMCNC: 34.7 G/DL (ref 31–37)
MCV RBC AUTO: 78 FL (ref 74–97)
MCV RBC AUTO: 78.2 FL (ref 74–97)
MINUTES UNTIL NEXT BG, NBG: 60 MIN
MONOCYTES # BLD: 0.3 K/UL (ref 0.05–1.2)
MONOCYTES NFR BLD: 6 % (ref 3–10)
MULTIPLIER, MUL: 0.02
MULTIPLIER, MUL: 0.03
NEUTS SEG # BLD: 3.3 K/UL (ref 1.8–8)
NEUTS SEG NFR BLD: 65 % (ref 40–73)
ORDER INITIALS, ORDINIT: NORMAL
PLATELET # BLD AUTO: 251 K/UL (ref 135–420)
PLATELET # BLD AUTO: 316 K/UL (ref 135–420)
PMV BLD AUTO: 10.9 FL (ref 9.2–11.8)
PMV BLD AUTO: 11.6 FL (ref 9.2–11.8)
POTASSIUM SERPL-SCNC: 3.6 MMOL/L (ref 3.5–5.5)
POTASSIUM SERPL-SCNC: 3.9 MMOL/L (ref 3.5–5.5)
POTASSIUM SERPL-SCNC: 5.1 MMOL/L (ref 3.5–5.5)
PROT SERPL-MCNC: 6.8 G/DL (ref 6.4–8.2)
PROT SERPL-MCNC: 9.1 G/DL (ref 6.4–8.2)
PTH-INTACT SERPL-MCNC: 72.4 PG/ML (ref 18.4–88)
RBC # BLD AUTO: 4.68 M/UL (ref 4.2–5.3)
RBC # BLD AUTO: 5.54 M/UL (ref 4.2–5.3)
SODIUM SERPL-SCNC: 127 MMOL/L (ref 136–145)
SODIUM SERPL-SCNC: 141 MMOL/L (ref 136–145)
SODIUM SERPL-SCNC: 141 MMOL/L (ref 136–145)
WBC # BLD AUTO: 5.1 K/UL (ref 4.6–13.2)
WBC # BLD AUTO: 7.8 K/UL (ref 4.6–13.2)

## 2019-07-30 PROCEDURE — 74011250637 HC RX REV CODE- 250/637: Performed by: INTERNAL MEDICINE

## 2019-07-30 PROCEDURE — 96365 THER/PROPH/DIAG IV INF INIT: CPT

## 2019-07-30 PROCEDURE — 74011250636 HC RX REV CODE- 250/636: Performed by: INTERNAL MEDICINE

## 2019-07-30 PROCEDURE — 74011250636 HC RX REV CODE- 250/636: Performed by: EMERGENCY MEDICINE

## 2019-07-30 PROCEDURE — 94762 N-INVAS EAR/PLS OXIMTRY CONT: CPT

## 2019-07-30 PROCEDURE — 36415 COLL VENOUS BLD VENIPUNCTURE: CPT

## 2019-07-30 PROCEDURE — 82962 GLUCOSE BLOOD TEST: CPT

## 2019-07-30 PROCEDURE — 83735 ASSAY OF MAGNESIUM: CPT

## 2019-07-30 PROCEDURE — 85027 COMPLETE CBC AUTOMATED: CPT

## 2019-07-30 PROCEDURE — 74011000258 HC RX REV CODE- 258: Performed by: EMERGENCY MEDICINE

## 2019-07-30 PROCEDURE — 74011636637 HC RX REV CODE- 636/637: Performed by: EMERGENCY MEDICINE

## 2019-07-30 PROCEDURE — 82306 VITAMIN D 25 HYDROXY: CPT

## 2019-07-30 PROCEDURE — 99218 HC RM OBSERVATION: CPT

## 2019-07-30 PROCEDURE — 80053 COMPREHEN METABOLIC PANEL: CPT

## 2019-07-30 PROCEDURE — 77030037878 HC DRSG MEPILEX >48IN BORD MOLN -B

## 2019-07-30 PROCEDURE — 74011636637 HC RX REV CODE- 636/637: Performed by: INTERNAL MEDICINE

## 2019-07-30 PROCEDURE — 83970 ASSAY OF PARATHORMONE: CPT

## 2019-07-30 RX ORDER — LANOLIN ALCOHOL/MO/W.PET/CERES
1 CREAM (GRAM) TOPICAL
Status: DISCONTINUED | OUTPATIENT
Start: 2019-07-30 | End: 2019-07-30 | Stop reason: HOSPADM

## 2019-07-30 RX ORDER — DEXTROSE 50 % IN WATER (D50W) INTRAVENOUS SYRINGE
25-50 AS NEEDED
Status: DISCONTINUED | OUTPATIENT
Start: 2019-07-30 | End: 2019-07-30

## 2019-07-30 RX ORDER — ATORVASTATIN CALCIUM 20 MG/1
20 TABLET, FILM COATED ORAL
Status: DISCONTINUED | OUTPATIENT
Start: 2019-07-30 | End: 2019-07-30 | Stop reason: HOSPADM

## 2019-07-30 RX ORDER — ACETAMINOPHEN 325 MG/1
650 TABLET ORAL
Status: DISCONTINUED | OUTPATIENT
Start: 2019-07-30 | End: 2019-07-30 | Stop reason: HOSPADM

## 2019-07-30 RX ORDER — NALOXONE HYDROCHLORIDE 0.4 MG/ML
0.4 INJECTION, SOLUTION INTRAMUSCULAR; INTRAVENOUS; SUBCUTANEOUS AS NEEDED
Status: DISCONTINUED | OUTPATIENT
Start: 2019-07-30 | End: 2019-07-30 | Stop reason: HOSPADM

## 2019-07-30 RX ORDER — MAGNESIUM SULFATE 100 %
4 CRYSTALS MISCELLANEOUS AS NEEDED
Status: DISCONTINUED | OUTPATIENT
Start: 2019-07-30 | End: 2019-07-30

## 2019-07-30 RX ORDER — ERGOCALCIFEROL 1.25 MG/1
50000 CAPSULE ORAL
Qty: 12 CAP | Refills: 0 | Status: SHIPPED | OUTPATIENT
Start: 2019-07-30

## 2019-07-30 RX ORDER — ATORVASTATIN CALCIUM 20 MG/1
20 TABLET, FILM COATED ORAL
Qty: 90 TAB | Refills: 2 | Status: SHIPPED | OUTPATIENT
Start: 2019-07-30

## 2019-07-30 RX ORDER — METFORMIN HYDROCHLORIDE 850 MG/1
850 TABLET ORAL 2 TIMES DAILY WITH MEALS
Status: DISCONTINUED | OUTPATIENT
Start: 2019-07-30 | End: 2019-07-30 | Stop reason: HOSPADM

## 2019-07-30 RX ORDER — OXYCODONE AND ACETAMINOPHEN 5; 325 MG/1; MG/1
1 TABLET ORAL
Status: DISCONTINUED | OUTPATIENT
Start: 2019-07-30 | End: 2019-07-30 | Stop reason: HOSPADM

## 2019-07-30 RX ORDER — DOCUSATE SODIUM 100 MG/1
100 CAPSULE, LIQUID FILLED ORAL
Status: DISCONTINUED | OUTPATIENT
Start: 2019-07-30 | End: 2019-07-30 | Stop reason: HOSPADM

## 2019-07-30 RX ORDER — LOSARTAN POTASSIUM 25 MG/1
25 TABLET ORAL DAILY
Status: DISCONTINUED | OUTPATIENT
Start: 2019-07-30 | End: 2019-07-30 | Stop reason: HOSPADM

## 2019-07-30 RX ORDER — METFORMIN HYDROCHLORIDE 850 MG/1
850 TABLET ORAL 2 TIMES DAILY WITH MEALS
Status: DISCONTINUED | OUTPATIENT
Start: 2019-07-30 | End: 2019-07-30

## 2019-07-30 RX ORDER — INSULIN LISPRO 100 [IU]/ML
INJECTION, SOLUTION INTRAVENOUS; SUBCUTANEOUS EVERY 4 HOURS
Status: DISCONTINUED | OUTPATIENT
Start: 2019-07-30 | End: 2019-07-30 | Stop reason: HOSPADM

## 2019-07-30 RX ORDER — METFORMIN HYDROCHLORIDE 850 MG/1
850 TABLET ORAL 2 TIMES DAILY WITH MEALS
Qty: 180 TAB | Refills: 0 | Status: SHIPPED | OUTPATIENT
Start: 2019-07-30 | End: 2022-10-23

## 2019-07-30 RX ORDER — ERGOCALCIFEROL 1.25 MG/1
50000 CAPSULE ORAL
Status: DISCONTINUED | OUTPATIENT
Start: 2019-07-30 | End: 2019-07-30 | Stop reason: HOSPADM

## 2019-07-30 RX ORDER — SODIUM CHLORIDE 9 MG/ML
100 INJECTION, SOLUTION INTRAVENOUS CONTINUOUS
Status: DISCONTINUED | OUTPATIENT
Start: 2019-07-30 | End: 2019-07-30

## 2019-07-30 RX ORDER — LOSARTAN POTASSIUM 25 MG/1
25 TABLET ORAL DAILY
Qty: 30 TAB | Refills: 3 | Status: SHIPPED | OUTPATIENT
Start: 2019-07-30

## 2019-07-30 RX ORDER — ONDANSETRON 2 MG/ML
4 INJECTION INTRAMUSCULAR; INTRAVENOUS
Status: DISCONTINUED | OUTPATIENT
Start: 2019-07-30 | End: 2019-07-30 | Stop reason: HOSPADM

## 2019-07-30 RX ORDER — SODIUM CHLORIDE 9 MG/ML
2000 INJECTION, SOLUTION INTRAVENOUS ONCE
Status: COMPLETED | OUTPATIENT
Start: 2019-07-30 | End: 2019-07-30

## 2019-07-30 RX ORDER — ENOXAPARIN SODIUM 100 MG/ML
40 INJECTION SUBCUTANEOUS EVERY 24 HOURS
Status: DISCONTINUED | OUTPATIENT
Start: 2019-07-30 | End: 2019-07-30 | Stop reason: HOSPADM

## 2019-07-30 RX ORDER — DEXTROSE 50 % IN WATER (D50W) INTRAVENOUS SYRINGE
25-50 AS NEEDED
Status: DISCONTINUED | OUTPATIENT
Start: 2019-07-30 | End: 2019-07-30 | Stop reason: HOSPADM

## 2019-07-30 RX ORDER — MAGNESIUM SULFATE 100 %
4 CRYSTALS MISCELLANEOUS AS NEEDED
Status: DISCONTINUED | OUTPATIENT
Start: 2019-07-30 | End: 2019-07-30 | Stop reason: HOSPADM

## 2019-07-30 RX ADMIN — ENOXAPARIN SODIUM 40 MG: 40 INJECTION SUBCUTANEOUS at 05:26

## 2019-07-30 RX ADMIN — INSULIN LISPRO 12 UNITS: 100 INJECTION, SOLUTION INTRAVENOUS; SUBCUTANEOUS at 11:51

## 2019-07-30 RX ADMIN — SODIUM CHLORIDE 10.8 UNITS/HR: 900 INJECTION, SOLUTION INTRAVENOUS at 01:11

## 2019-07-30 RX ADMIN — SODIUM CHLORIDE 2000 ML: 900 INJECTION, SOLUTION INTRAVENOUS at 05:11

## 2019-07-30 RX ADMIN — INSULIN LISPRO 6 UNITS: 100 INJECTION, SOLUTION INTRAVENOUS; SUBCUTANEOUS at 05:22

## 2019-07-30 RX ADMIN — INSULIN LISPRO 12 UNITS: 100 INJECTION, SOLUTION INTRAVENOUS; SUBCUTANEOUS at 16:50

## 2019-07-30 RX ADMIN — INSULIN LISPRO 2 UNITS: 100 INJECTION, SOLUTION INTRAVENOUS; SUBCUTANEOUS at 08:15

## 2019-07-30 RX ADMIN — METFORMIN HYDROCHLORIDE 850 MG: 850 TABLET, FILM COATED ORAL at 15:21

## 2019-07-30 RX ADMIN — SODIUM CHLORIDE, POTASSIUM CHLORIDE, SODIUM LACTATE AND CALCIUM CHLORIDE 1000 ML: 600; 310; 30; 20 INJECTION, SOLUTION INTRAVENOUS at 03:25

## 2019-07-30 RX ADMIN — ACETAMINOPHEN 650 MG: 325 TABLET ORAL at 12:02

## 2019-07-30 RX ADMIN — FERROUS SULFATE TAB 325 MG (65 MG ELEMENTAL FE) 325 MG: 325 (65 FE) TAB at 08:14

## 2019-07-30 RX ADMIN — ERGOCALCIFEROL 50000 UNITS: 1.25 CAPSULE ORAL at 15:21

## 2019-07-30 RX ADMIN — SODIUM CHLORIDE 100 ML/HR: 900 INJECTION, SOLUTION INTRAVENOUS at 07:30

## 2019-07-30 RX ADMIN — LOSARTAN POTASSIUM 25 MG: 25 TABLET ORAL at 15:21

## 2019-07-30 NOTE — DIABETES MGMT
Diabetes Patient/Family Education Record  Factors That  May Influence Patients Ability  to Learn or  Comply with Recommendations   []   Language barrier    []   Cultural needs   []   Motivation    []   Cognitive limitation    []   Physical   []   Education    []   Physiological factors   []   Hearing/vision/speaking impairment   []   Confucianist beliefs    []   Financial factors   []  Other:   [x]  No factors identified at this time. 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 Discussed healthy food options and reviewed what foods have carbohydrate. Pt had been drinking sweet tea, fruit juice, and regular soda. Willing to switch to sugar free beverages and water. [x]  Exercise   [x]  Signs, symptoms, and treatment of hyperglycemia and hypoglycemia   [x] Prevention, recognition and treatment of hyperglycemia and hypoglycemia   [x]  Importance of blood glucose monitoring and how to obtain a blood glucose meter Will provide glucometer and instruction.  Discussed more affordable generic meter options as pt reports not having insurance    []  Instruction on use of the blood glucose meter   [x]  Discuss the importance of HbA1C monitoring    []  Sick day guidelines   []  Proper use and disposal of lancets, needles, syringes or insulin pens (if appropriate)   [x]  Potential long-term complications (retinopathy, kidney disease, neuropathy, foot care) [x] Information about whom to contact in case of emergency or for more information    [x]  Goal:  Patient/family will demonstrate understanding of Diabetes Self Management Skills by: 8/07/19  Plan for post-discharge education or self-management support:    [x] Outpatient class schedule provided            [] Patient Declined    [] Scheduled for outpatient classes (date) _______  Verify:  Does patient understand how diabetes medications work? Not taking any currently   Does patient know what their most recent A1c is? reveiewed  Does patient monitor glucose at home? No (new diagnosis)  Does patient have difficulty obtaining diabetes medications or testing supplies?  Pt states she does not have insurance       Williams Hall RD, CDE

## 2019-07-30 NOTE — PROGRESS NOTES
Discharge order noted for today. Pt has been accepted to [facility] agency. Met with patient who is agreeable to the transition plan today. Transport has been arranged through daughter. Patient's discharge summary and home health  orders have been forwarded to [facility] home health  agency via que. Updated bedside RN Leonora Root,  to the transition plan. Discharge information has been documented on the AVS.     Indigent Meds provided to pharmacy will call nurses station when ready for . [place] set up. Daughter to transport home. Given information for diabetes education classes. PCP established with NP Margarito Marquez (Dr. Eva Camejo has left practice) August 14th at [address] form from unemployment office, photo ID, medication list, arrive 30 minutes prior      Reason for Admission:  New onset type 2 diabetes mellitus ([facility] [address] [E11.9]  Hyperosmolar non-ketotic state in patient with type 2 diabetes mellitus ([address] [E11.00]  Hyperosmolar non-ketotic state in patient with type 2 diabetes mellitus ([address] [E11.00]                 RRAT Score:    7            Plan for utilizing home health:    Yes, indigent sent to [facility] of Readmission:   LOW                         Transition of Care Plan:              Initial assessment completed with patient. Cognitive status of patient: oriented to time, place, person and situation. Address update: [address], [address]  Face sheet information confirmed:  yes. The patient designates daughter to participate in her discharge plan and to receive any needed information. This patient lives in an apartment alone. Patient is able to navigate steps as needed. Prior to hospitalization, patient was considered to be independent with ADLs/IADLS : yes . Patient has a current ACP document on file: no  The daughter will be available to transport patient home upon discharge.    The patient has no medical equipment available in the home.     Patient is not currently active with home health. Patient has not stayed in a skilled nursing facility or rehab. This patient is on dialysis :no    Currently, the discharge plan is Home with Home Health. The patient states that she can obtain her medications from the pharmacy, and take her medications as directed. Patient's current insurance is SELF PAY       Care Management Interventions  PCP Verified by CM:  Yes  Palliative Care Criteria Met (RRAT>21 & CHF Dx)?: No  Mode of Transport at Discharge: Self  Transition of Care Consult (CM Consult): [address]: Yes  Current Support Network: Lives Alone, Family Lives Nearby  Confirm Follow Up Transport: Family  Plan discussed with Pt/Family/Caregiver: Yes  Freedom of Choice Offered: Yes(indigent, [facility])  Discharge Location  Discharge Placement: Home with home health        RENUKA Onofre  Case Management  [phone]

## 2019-07-30 NOTE — H&P
History & Physical    Patient: Alexandra Welsh MRN: [id number]  CSN: [id number]    YOB: 1965  Age: 48 y.o. Sex: female      DOA: 7/29/2019    Chief Complaint:   Chief Complaint   Patient presents with    Blurred Vision          HPI:     Alexandra Welsh is a 48 y.o.  female who has PMH of arthritis and GERD presents to ER with progressively worsening polyuria and polydipsia for the last few days. Pt decided to come to ER after experiencing a sudden onset of dizziness and blurry vision while driving today  In ER, Pt was found to have highly elevated FS of 1198 with multiple electrolytes abnormalities but w/out ketoacidosis. IN ER, Pt was started on Insulin drip and received IVF boluses and starting to feel better. Pt will be admitted for new onset DM with hyperosmolar status. Denies dizziness and blurry vision and starting to gain her appetite. Denies N/V and has no other complaints at this time     Denies CP, SOB, fever, chills, nausea, vomiting, back pain, neck pain, HA, or any other associated sx.        Past Medical History:   Diagnosis Date    BMI 38.0-38.9,adult 5/19/2017    BMI 40.0-44.9, adult ([address] 5/19/2017    Obesity (BMI 35.0-39.9 without comorbidity) 4/10/2017    Osteoarthritis     Ulcer     gi ulcer       Past Surgical History:   Procedure Laterality Date    ABDOMEN SURGERY PROC UNLISTED      hernia    HX GYN      D&C       Family History   Problem Relation Age of Onset    Arthritis-osteo Other        Social History     Socioeconomic History    Marital status: SINGLE     Spouse name: Not on file    Number of children: Not on file    Years of education: Not on file    Highest education level: Not on file   Tobacco Use    Smoking status: Current Every Day Smoker     Packs/day: 0.25     Years: 15.00     Pack years: 3.75    Smokeless tobacco: Never Used   Substance and Sexual Activity    Alcohol use: Yes     Comment: occasionally    Drug use: No       Prior to Admission medications    Medication Sig Start Date End Date Taking? Authorizing Provider   cyanocobalamin (VITAMIN B12) 100 mcg tablet Take 100 mcg by mouth daily. Yes Other, MD Sulaiman   ferrous sulfate (IRON) 325 mg (65 mg iron) tablet Take  by mouth Daily (before breakfast). Yes Provider, Abiodun   diclofenac (VOLTAREN) 1 % gel Apply  to affected area four (4) times daily. Apply 4 g to affected area topically of the bilateral knees up to 4 times a day. 19  Yes Alejandro Fall PA-C   cyclobenzaprine (FLEXERIL) 10 mg tablet Take 1 Tab by mouth three (3) times daily as needed for Muscle Spasm(s). 19   Char Joshi PA-C       Allergies   Allergen Reactions    Latex Rash    Penicillins Other (comments)         Review of Systems  GENERAL: Patient alert, awake and oriented times 3, able to communicate full sentences and not in distress. HEENT: No change in vision, no earache, tinnitus, sore throat or sinus congestion. NECK: No pain or stiffness. PULMONARY: No shortness of breath, cough or wheeze. Cardiovascular: no pnd / orthopnea, no CP  GASTROINTESTINAL: No abdominal pain, + nausea, vomiting No diarrhea, melena or bright red blood per rectum. +ve loss of appetite   GENITOURINARY: + urinary frequency, No urgency, hesitancy or dysuria. MUSCULOSKELETAL: No joint or muscle pain, no back pain, no recent trauma. DERMATOLOGIC: No rash, no itching, no lesions. ENDOCRINE: + polyuria, polydipsia, no heat or cold intolerance. +recent change in weight. HEMATOLOGICAL: No anemia or easy bruising or bleeding. NEUROLOGIC: No headache, seizures, numbness, tingling or weakness. Physical Exam:     Physical Exam:  Visit Vitals  BP (!) 135/96   Pulse (!) 102   Temp 98.1 °F (36.7 °C)   Resp 14   Ht 4' 10\" (1.473 m)   Wt 90.7 kg (200 lb)   SpO2 97%   Breastfeeding?  No   BMI 41.80 kg/m²      O2 Device: Room air    Temp (24hrs), Av.3 °F (36.8 °C), Min:98.1 °F (36.7 °C), Max:98.7 °F (37.1 °C)    07/29 1901 - 07/30 0700  In: 1833.3 [I.V.:1833.3]  Out: -    No intake/output data recorded. General:  Alert, cooperative,was in distress, appears stated age. Head: Normocephalic, without obvious abnormality, atraumatic. Eyes:  Conjunctivae/corneas clear. PERRL, EOMs intact. Nose: Nares normal. No drainage or sinus tenderness. Neck: Supple, symmetrical, trachea midline, no adenopathy, thyroid: no enlargement, no carotid bruit and no JVD. Lungs:   Clear to auscultation bilaterally. Heart:  Regular rate and rhythm, S1, S2 normal.     Abdomen: Soft, non-tender. Bowel sounds normal.    Extremities: Extremities normal, atraumatic, no cyanosis or edema. Pulses: 2+ and symmetric all extremities. Skin:  No rashes or lesions   Neurologic: AAOx3, No focal motor or sensory deficit. Labs Reviewed:    Lab results reviewed. For significant abnormal values and values requiring intervention, see assessment and plan.   CXR and EKG    Procedures/imaging: see electronic medical records for all procedures/Xrays and details which were not copied into this note but were reviewed prior to creation of Plan      Assessment/Plan     Principal Problem:    Hyperosmolar non-ketotic state in patient with type 2 diabetes mellitus ([facility] [address] (7/30/2019)    Active Problems:    New onset type 2 diabetes mellitus ([facility] [address] (7/30/2019)      Acute kidney insufficiency (7/30/2019)      Essential hypertension (7/30/2019)       Pt is being admitted for hyperosmolar status 2 ry to new onset DM  Morbidly obese   Multiple electrolytes abnormalities including pseudohyponatremia, hypochloremia and pseudohyperkalemia   KAYLI  2ry to above   Elevated BP     Continue aggressive hydration   Will d/c Insulin drip and start SSI  Will need to be transitioned to PO meds  Diabetes education consult   IVF  <Monitor electrolytes Q8 hours   Will transfer to floor   Would add Lisinopril once kidney function normalizes   Diet and exercise advised   Will need f/u with ophthalmology and podiatry as an OP         DVT/GI Prophylaxis: Lovenox    Plan of care is discussed in details with Patient/Family at bedside and agreed upon    Drew Palomino MD  7/30/2019 4:47 AM

## 2019-07-30 NOTE — ED NOTES
Per pharmacy; lr is not to be running with an insulin gtt. .. lr is stopped. .. No adverse rxn noted. ..

## 2019-07-30 NOTE — PROGRESS NOTES
1515:  New orders for discharge home noted.  sending prescriptions to [facility] outpatient pharmacy to be filled. [facility] consult in process. Scheduled oral medications given as ordered, see MAR. Accucheck =306 at this time; orders noted may discharge this afternoon if FSBS <200. Provider paged to notify of accucheck 306; patient aware. 1605: Attending provider notified of Jeb Appl =306; MD states to cover  FSBS with dinner tray, eat dinner then may discharge home as ordered. 1730: Insulin coverage given for FSBS, dinner served. 1800:  Discharge instructions reviewed at length with patient; she verbalizes all info. Indigent medications filled at [address], hand delivered to patient. Patient has Glucometer and supplies given to her by Diabetic Educator this morning. 1815: Discharged home via wheelchair to private vehicle.

## 2019-07-30 NOTE — ED PROVIDER NOTES
Hailey Jackson is a 48 y.o. Female with past medical history of arthritis and reflux coming in with blurry vision and dizziness. Patient states that for the last 3 to 4 days she has had blurry vision and dizziness. She states that the dizziness is a \"feeling off balance. \"  She states when she was driving to work tonight she felt like she had really bad very blurry vision so she came in for evaluation. Patient states that she is been urinating very frequently and is thirsty drinking lots of water. He denies any nausea vomiting or diarrhea. She denies pain. She denies any headache, weakness, numbness, or other neurologic deficits. No other complaints at this time.            Past Medical History:   Diagnosis Date    BMI 38.0-38.9,adult 5/19/2017    BMI 40.0-44.9, adult ([facility] [address] 5/19/2017    Obesity (BMI 35.0-39.9 without comorbidity) 4/10/2017    Osteoarthritis     Ulcer     gi ulcer       Past Surgical History:   Procedure Laterality Date    ABDOMEN SURGERY PROC UNLISTED      hernia    HX GYN      D&C         Family History:   Problem Relation Age of Onset    Arthritis-osteo Other        Social History     Socioeconomic History    Marital status: SINGLE     Spouse name: Not on file    Number of children: Not on file    Years of education: Not on file    Highest education level: Not on file   Occupational History    Not on file   Social Needs    Financial resource strain: Not on file    Food insecurity:     Worry: Not on file     Inability: Not on file    Transportation needs:     Medical: Not on file     Non-medical: Not on file   Tobacco Use    Smoking status: Current Every Day Smoker     Packs/day: 0.25     Years: 15.00     Pack years: 3.75    Smokeless tobacco: Never Used   Substance and Sexual Activity    Alcohol use: Yes     Comment: occasionally    Drug use: No    Sexual activity: Not on file   Lifestyle    Physical activity:     Days per week: Not on file     Minutes per session: Not on file    Stress: Not on file   Relationships    Social connections:     Talks on phone: Not on file     Gets together: Not on file     Attends Synagogue service: Not on file     Active member of club or organization: Not on file     Attends meetings of clubs or organizations: Not on file     Relationship status: Not on file    Intimate partner violence:     Fear of current or ex partner: Not on file     Emotionally abused: Not on file     Physically abused: Not on file     Forced sexual activity: Not on file   Other Topics Concern    Not on file   Social History Narrative    Not on file         ALLERGIES: Latex and Penicillins    Review of Systems   Constitutional: Negative. Negative for chills and fever. Eyes: Positive for visual disturbance. Respiratory: Negative. Negative for shortness of breath. Cardiovascular: Negative. Negative for chest pain. Gastrointestinal: Negative. Negative for abdominal pain, nausea and vomiting. Endocrine: Positive for polydipsia and polyuria. Genitourinary: Negative. Negative for dysuria. Musculoskeletal: Negative. Negative for back pain and myalgias. Skin: Negative. Negative for rash and wound. Neurological: Positive for dizziness. Negative for syncope, speech difficulty, weakness and light-headedness. Psychiatric/Behavioral: Negative. Negative for suicidal ideas. All other systems reviewed and are negative. Vitals:    07/29/19 2258 07/30/19 0118   BP: (!) 158/94 123/82   Pulse: 98 90   Resp: 20 12   Temp: 98.7 °F (37.1 °C) 98.1 °F (36.7 °C)   SpO2: 98% 98%   Weight: 90.7 kg (200 lb)    Height: 4' 10\" (1.473 m)             Physical Exam   Constitutional: She is oriented to person, place, and time. She appears well-developed and well-nourished. No distress. HENT:   Head: Normocephalic and atraumatic. Dry mucous membranes   Eyes: Pupils are equal, round, and reactive to light.  Conjunctivae and EOM are normal.   Neck: Trachea normal and normal range of motion. Neck supple. No JVD present. Cardiovascular: Normal rate, regular rhythm, S1 normal and S2 normal. Exam reveals no gallop and no friction rub. No murmur heard. Pulmonary/Chest: Effort normal and breath sounds normal. No accessory muscle usage. No respiratory distress. Abdominal: Soft. Normal appearance. She exhibits no distension. There is no tenderness. There is no rigidity and no guarding. Musculoskeletal: Normal range of motion. She exhibits no edema or tenderness. Neurological: She is alert and oriented to person, place, and time. She has normal strength. No cranial nerve deficit or sensory deficit. Coordination normal.   Normal finger-to-nose, normal rapid alternating movements, no cerebellar signs or ataxia. No facial asymmetry or dysarthria. Skin: Skin is warm and intact. No rash noted. Psychiatric: She has a normal mood and affect. Her speech is normal and behavior is normal.   Vitals reviewed. MDM  Number of Diagnoses or Management Options  Diabetes mellitus with nonketotic hyperosmolarity ([facility] [address]):   New onset type 2 diabetes mellitus [facility]):   Diagnosis management comments: Dung Mcleod is a 48 y.o. Female coming in with polyuria, polydipsia, dizziness, blurred vision. Finger stick blood sugar greater than 600. Normal neurologic exam without any evidence of CVA or other second this is likely due to new onset diabetes. Will get labs, urine, and start IV fluid resuscitation. Patient's blood sugar came back just shy of 1200. She is not DKA. Heart rate is within normal limits. Patient will need aggressive volume resuscitation, but is hemodynamically stable. Will place on glucose stabilizer/insulin drip and admit to the stepdown unit for further treatment.          Procedures      Vitals:  Patient Vitals for the past 12 hrs:   Temp Pulse Resp BP SpO2   07/30/19 0118 98.1 °F (36.7 °C) 90 12 123/82 98 %   07/29/19 2258 98.7 °F (37.1 °C) 98 20 (!) 158/94 98 % Medications ordered:   Medications   insulin regular (NOVOLIN R, HUMULIN R) 100 Units in 0.9% sodium chloride 100 mL infusion (10.8 Units/hr IntraVENous New Bag 7/30/19 0111)   glucose chewable tablet 16 g (has no administration in time range)   glucagon (GLUCAGEN) injection 1 mg (has no administration in time range)   dextrose (D50W) injection syrg 12.5-25 g (has no administration in time range)   lactated ringers bolus infusion 1,000 mL (has no administration in time range)     Followed by   lactated ringers bolus infusion 1,000 mL (has no administration in time range)   sodium chloride 0.9 % bolus infusion 1,000 mL (1,000 mL IntraVENous New Bag 7/29/19 4700)         Lab findings:  Recent Results (from the past 12 hour(s))   GLUCOSE, POC    Collection Time: 07/29/19 11:29 PM   Result Value Ref Range    Glucose (POC) >600 (HH) 70 - 110 mg/dL   CBC WITH AUTOMATED DIFF    Collection Time: 07/29/19 11:40 PM   Result Value Ref Range    WBC 5.1 4.6 - 13.2 K/uL    RBC 5.54 (H) 4.20 - 5.30 M/uL    HGB 15.0 12.0 - 16.0 g/dL    HCT 43.2 35.0 - 45.0 %    MCV 78.0 74.0 - 97.0 FL    MCH 27.1 24.0 - 34.0 PG    MCHC 34.7 31.0 - 37.0 g/dL    RDW 13.6 11.6 - 14.5 %    PLATELET 416 460 - 281 K/uL    MPV 11.6 9.2 - 11.8 FL    NEUTROPHILS 65 40 - 73 %    LYMPHOCYTES 29 21 - 52 %    MONOCYTES 6 3 - 10 %    EOSINOPHILS 0 0 - 5 %    BASOPHILS 0 0 - 2 %    ABS. NEUTROPHILS 3.3 1.8 - 8.0 K/UL    ABS. LYMPHOCYTES 1.5 0.9 - 3.6 K/UL    ABS. MONOCYTES 0.3 0.05 - 1.2 K/UL    ABS. EOSINOPHILS 0.0 0.0 - 0.4 K/UL    ABS.  BASOPHILS 0.0 0.0 - 0.1 K/UL    DF AUTOMATED     METABOLIC PANEL, COMPREHENSIVE    Collection Time: 07/29/19 11:40 PM   Result Value Ref Range    Sodium 127 (L) 136 - 145 mmol/L    Potassium 5.1 3.5 - 5.5 mmol/L    Chloride 88 (L) 100 - 111 mmol/L    CO2 28 21 - 32 mmol/L    Anion gap 11 3.0 - 18 mmol/L    Glucose 1,198 (HH) 74 - 99 mg/dL    BUN 12 7.0 - 18 MG/DL    Creatinine 1.44 (H) 0.6 - 1.3 MG/DL    BUN/Creatinine ratio 8 (L) 12 - 20      GFR est AA 47 (L) >60 ml/min/1.73m2    GFR est non-AA 38 (L) >60 ml/min/1.73m2    Calcium 10.4 (H) 8.5 - 10.1 MG/DL    Bilirubin, total 0.9 0.2 - 1.0 MG/DL    ALT (SGPT) 36 13 - 56 U/L    AST (SGOT) 9 (L) 10 - 38 U/L    Alk. phosphatase 132 (H) 45 - 117 U/L    Protein, total 9.1 (H) 6.4 - 8.2 g/dL    Albumin 4.2 3.4 - 5.0 g/dL    Globulin 4.9 (H) 2.0 - 4.0 g/dL    A-G Ratio 0.9 0.8 - 1.7     MAGNESIUM    Collection Time: 07/29/19 11:40 PM   Result Value Ref Range    Magnesium 2.8 (H) 1.6 - 2.6 mg/dL   URINALYSIS W/ RFLX MICROSCOPIC    Collection Time: 07/29/19 11:40 PM   Result Value Ref Range    Color YELLOW      Appearance CLEAR      Specific gravity >1.030 (H) 1.005 - 1.030    pH (UA) 5.5 5.0 - 8.0      Protein NEGATIVE  NEG mg/dL    Glucose >1,000 (A) NEG mg/dL    Ketone NEGATIVE  NEG mg/dL    Bilirubin NEGATIVE  NEG      Blood NEGATIVE  NEG      Urobilinogen 0.2 0.2 - 1.0 EU/dL    Nitrites NEGATIVE  NEG      Leukocyte Esterase NEGATIVE  NEG     HCG URINE, QL    Collection Time: 07/29/19 11:40 PM   Result Value Ref Range    HCG urine, QL NEGATIVE  NEG     GLUCOSE, POC    Collection Time: 07/30/19  1:02 AM   Result Value Ref Range    Glucose (POC) >600 (HH) 70 - 110 mg/dL   GLUCOSTABILIZER    Collection Time: 07/30/19  1:09 AM   Result Value Ref Range    Glucose 601 mg/dL    Insulin order 10.8 units/hour    Insulin adminstered 10.8 units/hour    Multiplier 0.020     Low target 140 mg/dL    High target 180 mg/dL    D50 order 0.0 ml    D50 administered 0.00 ml    Minutes until next BG 60 min    Order initials sr     Administered initials tx        X-Ray, CT or other radiology findings or impressions:  No orders to display       Progress notes, Consult notes or additional Procedure notes:     Consult: Spoke to Dr. Barbara Sanders, hospitalist, regarding patient's symptoms, results, and need for admission. He agrees to admit to the stepdown unit for further care.       Critical Care Time:  The services I provided to this patient were to treat and/or prevent clinically significant deterioration that could result in the failure of one or more body systems and/or organ systems due to hyperosmotic, nonketotic state and severe hyperglycemia. Services included the following:  -reviewing nursing notes and old charts  -vital sign assessments  -direct patient care  -medication orders and management  -interpreting and reviewing diagnostic studies/labs  -re-evaluations  -documentation time    Aggregate critical care time was 40 minutes, which includes only time during which I was engaged in work directly related to the patient's care as described above, whether I was at bedside or elsewhere in the Emergency Department. It did not include time spent performing other reported procedures or the services of residents, students, nurses, or advance practice providers. Meeta Lyon MD    1:58 AM      Reevaluation of patient:   I have reassessed the patient. Patient is resting comfortably in bed. I discussed all her results and need for admission. Patient verbally states understanding agrees with this plan all questions answered. Disposition:  Diagnosis:   1. New onset type 2 diabetes mellitus ([facility] [address]    2. Diabetes mellitus with nonketotic hyperosmolarity (HCC)        Disposition: Admit to stepdown unit    Follow-up Information    None          Patient's Medications   Start Taking    No medications on file   Continue Taking    CYANOCOBALAMIN (VITAMIN B12) 100 MCG TABLET    Take 100 mcg by mouth daily. CYCLOBENZAPRINE (FLEXERIL) 10 MG TABLET    Take 1 Tab by mouth three (3) times daily as needed for Muscle Spasm(s). DICLOFENAC (VOLTAREN) 1 % GEL    Apply  to affected area four (4) times daily. Apply 4 g to affected area topically of the bilateral knees up to 4 times a day. FERROUS SULFATE (IRON) 325 MG (65 MG IRON) TABLET    Take  by mouth Daily (before breakfast).    These Medications have changed No medications on file   Stop Taking    No medications on file

## 2019-07-30 NOTE — ROUTINE PROCESS
Bedside, Verbal and Written shift change report given to Belkys Vaughan (oncoming nurse) by Pearl Jackson RN   (offgoing nurse). Report included the following information SBAR, Kardex, ED Summary, Intake/Output, MAR, Recent Results and Cardiac Rhythm ST/SR.

## 2019-07-30 NOTE — DIABETES MGMT
GLYCEMIC CONTROL PLAN OF CARE     Assessment/Recommendations:  Pt newly diagnosed with diabetes. Blood glucose currently elevated above targets. Recommend changing frequency of correctional Lispro insulin to 4 times daily ACHS. Consider addition of basal insulin. Pt provided with Contour Next EZ glucometer with demonstration. Pt reports she does not have health insurance and will get the [employer]ion brand glucometer from [address] [place] [address] after discharged. Diabetes and nutrition education provided. Most recent blood glucose values:  7/30/2019 05:19 7/30/2019 07:33 7/30/2019 10:40   264 (H) 196 (H) 306 (H)     Current A1C of 9.6% is equivalent to average blood glucose of 229 mg/dl over the past 2-3 months.     Current hospital diabetes medications:   Correctional Lispro insulin every 4 hours    Home diabetes medications: none    Diet:  Diabetic consistent carbohydrate     Education:  __x__Refer to Diabetes Education Record             ____Education not indicated at this time      Cyn Oliva RD, CDE

## 2019-07-30 NOTE — HOME CARE
Rec  HC order - d/c noted for today -discussed homebound requirement with pt - she states she is not driving at this time - and agreeable with homebound status - has glucometer - sugar is still increased, and she may not go home today - has not seen PCP in greater than 6 months - informed she MUST follow up with PCP - informed her of billing process, but will get order for MSW to assist with insurance and indigent resources - order rec from  Dr. Georgette Staley.   [facility] will follow - D Brionna CHRISTOPHER     Noted followup arranged with Dr. Imani Bethea MD - referral updated with followup information - D Brionna CHRISTOPHER

## 2019-07-30 NOTE — PROGRESS NOTES
Nutrition:    Nutrition risk referral received from RN screen. Patient denies unintentional weight loss prior to admission and denies any change in po intake due to decreased appetite. Full nutrition assessment is not indicated at this time; see Nutrition Education note for additional details. Will re-screen as appropriate.         Mercedes Merino RD, [facility]

## 2019-07-30 NOTE — ROUTINE PROCESS
TRANSFER - OUT REPORT:    Verbal report given to pooja middleton on Jared Red  being transferred to [facility] # [id number] for routine progression of care       Report consisted of patients Situation, Background, Assessment and   Recommendations(SBAR). Information from the following report(s) SBAR, ED Summary, MAR, Recent Results, Med Rec Status and Cardiac Rhythm SR was reviewed with the receiving nurse. Lines:   Peripheral IV 07/29/19 Right Antecubital (Active)   Site Assessment Clean, dry, & intact 7/29/2019 11:40 PM   Phlebitis Assessment 0 7/29/2019 11:40 PM   Infiltration Assessment 0 7/29/2019 11:40 PM   Dressing Status Clean, dry, & intact 7/29/2019 11:40 PM   Dressing Type Transparent 7/29/2019 11:40 PM        Opportunity for questions and clarification was provided.       Patient transported with:   Monitor   INSULIN DRIP  IV PUMP

## 2019-07-30 NOTE — DISCHARGE SUMMARY
Discharge Summary    Patient: Maurilio Cowden               Sex: female          DOA: 7/29/2019         YOB: 1965      Age:  48 y.o.        LOS:  LOS: 1 day                Admit Date: 7/29/2019    Discharge Date: 7/30/2019    Primary care physician: Jazmin Martines MD    Discharge Diagnoses:    1)  Hyperosmolar non-ketotic state in patient with type diabetes mellitus, suspected. RESOLVED   2)  Type 2 diabetes mellitus, new diagnosis   3)  Prerenal azotemia, acute kidney insufficiency, resolved   4)  Dehydration, resolved   5)  Hypertension, started on losartan  6)  Obesity   7)  Vitamin D deficiency, on weekly supplement      Discharge Condition: Good  Disposition: home with home health  CODE STATUS: full Code     Follow up for Primary Care Physician:  1) She has new diagnosis of DM2, she needs ophthalmology follow up   She needs Podiatry evaluation annually   She has been started on Metformin, Lipitor, Losartan, please monitor and adjust medications as clinically indicated. Hospital Course:   Per H&P on admission:  Maurilio Cowden is a 48 y.o.  female who has PMH of arthritis and GERD presents to ER with progressively worsening polyuria and polydipsia for the last few days. Pt decided to come to ER after experiencing a sudden onset of dizziness and blurry vision while driving today  In ER, Pt was found to have highly elevated FS of 1198 with multiple electrolytes abnormalities but w/out ketoacidosis. IN ER, Pt was started on Insulin drip and received IVF boluses and starting to feel better. Pt will be admitted for new onset DM with hyperosmolar status. Denies dizziness and blurry vision and starting to gain her appetite. Denies N/V and has no other complaints at this time      Denies CP, SOB, fever, chills, nausea, vomiting, back pain, neck pain, HA, or any other associated sx. Last 24 Hours:  Her symptom resolved. She was up and out of bed without complaint.  Her FSBS has been controlled with sliding scale insulin. Oral hydration and intake tolerated. No blurry vision today  No headache. Polyuria has resolved     Calcium level normalized, normal PTH. Vitamin D level low. Renal function corrected. Diabetic education provided. Education provided to monitor FSBS daily. ROS: no fever/chills, no headache, no dizziness, no facial pain, no sinus congestion,   No swallowing pain, No chest pain, no palpitation, no shortness of breath, no abd pain,  No diarrhea, no urinary complaint, no leg pain or swelling    VS:   Visit Vitals  /66   Pulse 82   Temp 98.3 °F (36.8 °C)   Resp 16   Ht 4' 10\" (1.473 m)   Wt 90.7 kg (200 lb)   SpO2 99%   Breastfeeding? No   BMI 41.80 kg/m²      Tmax/24hrs: Temp (24hrs), Av.2 °F (36.8 °C), Min:97.8 °F (36.6 °C), Max:98.7 °F (37.1 °C)      Intake/Output Summary (Last 24 hours) at 2019 1434  Last data filed at 2019 1300  Gross per 24 hour   Intake 2553.33 ml   Output 500 ml   Net 2053.33 ml       Tele: sinus  General:  Cooperative, Not in acute distress, speaks in full sentence while in bed  HEENT: PERRL, EOMI, supple neck, no JVD, dry oral mucosa  Cardiovascular: S1S2 regular, no rub/gallop   Pulmonary: Clear air entry bilaterally, no wheezing, no crackle  GI:  Soft, non tender, non distended, +bs, no guarding   Extremities:  No pedal edema, +distal pulses appreciated   Neuro: AOx3, moving all extremities, no gross deficit.      Consults: none    Significant Diagnostic Studies: none    Lab/Data Review:  Labs: Results:       Chemistry Recent Labs     19  1243 19  0909 19  0510 19  2340   *  --  293* 1,198*     --  141 127*   K 3.9  --  3.6 5.1     --  103 88*   CO2 30  --  31 28   BUN 9  --  11 12   CREA 0.91  --  1.15 1.44*   CA 8.7 8.7 10.2* 10.4*   AGAP 6  --  7 11   BUCR 10*  --  10* 8*   AP 97  --   --  132*   TP 6.8  --   --  9.1*   ALB 3.3*  --   --  4.2   GLOB 3.5  --   -- 4.9*   AGRAT 0.9  --   --  0.9      CBC w/Diff Recent Labs     07/30/19  1243 07/29/19  2340   WBC 7.8 5.1   RBC 4.68 5.54*   HGB 12.7 15.0   HCT 36.6 43.2    316   GRANS  --  65   LYMPH  --  29   EOS  --  0      HgbA1c Lab Results   Component Value Date/Time    Hemoglobin A1c 9.6 (H) 07/29/2019 11:40 PM        Iron/Ferritin    BNP    Cardiac Enzymes    Liver Enzymes Recent Labs     07/30/19  1243   TP 6.8   ALB 3.3*   AP 97   SGOT 12       Lab Results   Component Value Date/Time    Vitamin D 25-Hydroxy 7.7 (L) 07/30/2019 09:09 AM       Lab Results   Component Value Date/Time    Calcium 8.7 07/30/2019 12:43 PM    PTH, Intact 72.4 07/30/2019 09:09 AM          All Micro Results     None                Medications at discharge  including reasons for change and indications for new ones:   Current Discharge Medication List      START taking these medications    Details   atorvastatin (LIPITOR) 20 mg tablet Take 1 Tab by mouth nightly. Indications: primary prevention of heart attack  Qty: 90 Tab, Refills: 2      ergocalciferol (ERGOCALCIFEROL) 50,000 unit capsule Take 1 Cap by mouth every seven (7) days. Indications: vitamin D deficiency  Qty: 12 Cap, Refills: 0      losartan (COZAAR) 25 mg tablet Take 1 Tab by mouth daily. Indications: high blood pressure  Qty: 30 Tab, Refills: 3      metFORMIN (GLUCOPHAGE) 850 mg tablet Take 1 Tab by mouth two (2) times daily (with meals). Indications: type 2 diabetes mellitus  Qty: 180 Tab, Refills: 0         CONTINUE these medications which have NOT CHANGED    Details   cyanocobalamin (VITAMIN B12) 100 mcg tablet Take 100 mcg by mouth daily. ferrous sulfate (IRON) 325 mg (65 mg iron) tablet Take  by mouth Daily (before breakfast). diclofenac (VOLTAREN) 1 % gel Apply  to affected area four (4) times daily. Apply 4 g to affected area topically of the bilateral knees up to 4 times a day.   Qty: 100 g, Refills: 3      cyclobenzaprine (FLEXERIL) 10 mg tablet Take 1 Tab by mouth three (3) times daily as needed for Muscle Spasm(s).   Qty: 15 Tab, Refills: 0                     Pending laboratory work and tests: none    Activity: Activity as tolerated    Diet: Cardiac Diet, Diabetic Diet and Low fat, Low cholesterol    Wound Care: None needed        Fernando Green MD  7/30/2019  2:34 PM

## 2019-07-30 NOTE — DISCHARGE INSTRUCTIONS
Discharge Instructions    Patient: Padmaja Jacobs MRN: [id number]  CSN: [id number]    YOB: 1965  Age: 48 y.o. Sex: female    DOA: 7/29/2019 LOS:  LOS: 1 day   Discharge Date:      ACUTE DIAGNOSES:    1)  Hyperosmolar non-ketotic state in patient with type diabetes mellitus, suspected. 2)  Type 2 diabetes mellitus, new diagnosis   3)  Prerenal azotemia, acute kidney insufficiency  4)  Dehydration   5)  Hypertension   6)  Obesity   7)  Vitamin D deficiency         DISCHARGE MEDICATIONS:     YOU HAVE BEEN STARTED ON NEW MEDICATIONS THAT TREAT FOR TYPE 2 DIABETES MELLITUS AND THE ASSOCIATED RISKS. PLEASE TAKE THEM AS PRESCRIBED. · It is important that you take the medication exactly as they are prescribed. · Keep your medication in the bottles provided by the pharmacist and keep a list of the medication names, dosages, and times to be taken in your wallet. · Do not take other medications without consulting your doctor. DIET:  Cardiac Diet, Diabetic Diet and Low fat, Low cholesterol    ACTIVITY: Activity as tolerated  Please monitor your fingerstick blood sugar daily in the morning. Your goal morning sugar should be 90 to 120. You needs to follow up with ophthalmology and podiatry for further care. ADDITIONAL INFORMATION: If you experience any of the following symptoms then please call your primary care physician or return to the emergency room if you cannot get hold of your doctor: Fever, chills, nausea, vomiting, diarrhea, change in mentation, falling, bleeding, shortness of breath. FOLLOW UP CARE:  Dr. Adele Baez MD  you are to call and set up an appointment to see them in 7-10 days. Type 2 Diabetes: Care Instructions  Your Care Instructions    Type 2 diabetes is a disease that develops when the body's tissues cannot use insulin properly. Over time, the pancreas cannot make enough insulin.  Insulin is a hormone that helps the body's cells use sugar (glucose) for energy. It also helps the body store extra sugar in muscle, fat, and liver cells. Without insulin, the sugar cannot get into the cells to do its work. It stays in the blood instead. This can cause high blood sugar levels. A person has diabetes when the blood sugar stays too high too much of the time. Over time, diabetes can lead to diseases of the heart, blood vessels, nerves, kidneys, and eyes. You may be able to control your blood sugar by losing weight, eating a healthy diet, and getting daily exercise. You may also have to take insulin or other diabetes medicine. Follow-up care is a key part of your treatment and safety. Be sure to make and go to all appointments. Call your doctor if you are having problems. It's also a good idea to know your test results and keep a list of the medicines you take. How can you care for yourself at home? · Keep your blood sugar at a target level (which you set with your doctor). ? Eat a good diet that spreads carbohydrate throughout the day. Carbohydrate--the body's main source of fuel--affects blood sugar more than any other nutrient. Carbohydrate is in fruits, vegetables, milk, and yogurt. It also is in breads, cereals, vegetables such as potatoes and corn, and sugary foods such as candy and cakes. ? Aim for 30 minutes of exercise on most, preferably all, days of the week. Walking is a good choice. You also may want to do other activities, such as running, swimming, cycling, or playing tennis or team sports. If your doctor says it's okay, do muscle-strengthening exercises at least 2 times a week. ? Take your medicines exactly as prescribed. Call your doctor if you think you are having a problem with your medicine. You will get more details on the specific medicines your doctor prescribes. · Check your blood sugar as often as your doctor recommends. It is important to keep track of any symptoms you have, such as low blood sugar.  Also tell your doctor if you have any changes in your activities, diet, or insulin use. · Talk to your doctor before you start taking aspirin every day. Aspirin can help certain people lower their risk of a heart attack or stroke. But taking aspirin isn't right for everyone, because it can cause serious bleeding. · Do not smoke. If you need help quitting, talk to your doctor about stop-smoking programs and medicines. These can increase your chances of quitting for good. · Keep your cholesterol and blood pressure at normal levels. You may need to take one or more medicines to reach your goals. Take them exactly as directed. Do not stop or change a medicine without talking to your doctor first.  When should you call for help? Call 911 anytime you think you may need emergency care. For example, call if:    · You passed out (lost consciousness), or you suddenly become very sleepy or confused. (You may have very low blood sugar.)    Call your doctor now or seek immediate medical care if:    · Your blood sugar is 300 mg/dL or is higher than the level your doctor has set for you.     · You have symptoms of low blood sugar, such as:  ? Sweating. ? Feeling nervous, shaky, and weak. ? Extreme hunger and slight nausea. ? Dizziness and headache.  ? Blurred vision. ? Confusion.    Watch closely for changes in your health, and be sure to contact your doctor if:    · You often have problems controlling your blood sugar.     · You have symptoms of long-term diabetes problems, such as:  ? New vision changes. ? New pain, numbness, or tingling in your hands or feet. ? Skin problems. Where can you learn more? Go to http://macy-ana m.info/. Enter C553 in the search box to learn more about \"Type 2 Diabetes: Care Instructions. \"  Current as of: July 25, 2018  Content Version: 12.1  © 8249-1353 Healthwise, Inc[employer].  Care instructions adapted under license by [employer] (which disclaims liability or warranty for this information). If you have questions about a medical condition or this instruction, always ask your healthcare professional. [address] any warranty or liability for your use of this information. Learning About Meal Planning for Diabetes  Why plan your meals? Meal planning can be a key part of managing diabetes. Planning meals and snacks with the right balance of carbohydrate, protein, and fat can help you keep your blood sugar at the target level you set with your doctor. You don't have to eat special foods. You can eat what your family eats, including sweets once in a while. But you do have to pay attention to how often you eat and how much you eat of certain foods. You may want to work with a dietitian or a certified diabetes educator. He or she can give you tips and meal ideas and can answer your questions about meal planning. This health professional can also help you reach a healthy weight if that is one of your goals. What plan is right for you? Your dietitian or diabetes educator may suggest that you start with the plate format or carbohydrate counting. The plate format  The plate format is a simple way to help you manage how you eat. You plan meals by learning how much space each food should take on a plate. Using the plate format helps you spread carbohydrate throughout the day. It can make it easier to keep your blood sugar level within your target range. It also helps you see if you're eating healthy portion sizes. To use the plate format, you put non-starchy vegetables on half your plate. Add meat or meat substitutes on one-quarter of the plate. Put a grain or starchy vegetable (such as brown rice or a potato) on the final quarter of the plate.  You can add a small piece of fruit and some low-fat or fat-free milk or yogurt, depending on your carbohydrate goal for each meal.  Here are some tips for using the plate format:  · Make sure that you are not using an oversized plate. A 9-inch plate is best. Many restaurants use larger plates. · Get used to using the plate format at home. Then you can use it when you eat out. · Write down your questions about using the plate format. Talk to your doctor, a dietitian, or a diabetes educator about your concerns. Carbohydrate counting  With carbohydrate counting, you plan meals based on the amount of carbohydrate in each food. Carbohydrate raises blood sugar higher and more quickly than any other nutrient. It is found in desserts, breads and cereals, and fruit. It's also found in starchy vegetables such as potatoes and corn, grains such as rice and pasta, and milk and yogurt. Spreading carbohydrate throughout the day helps keep your blood sugar levels within your target range. Your daily amount depends on several things, including your weight, how active you are, which diabetes medicines you take, and what your goals are for your blood sugar levels. A registered dietitian or diabetes educator can help you plan how much carbohydrate to include in each meal and snack. A guideline for your daily amount of carbohydrate is:  · 45 to 60 grams at each meal. That's about the same as 3 to 4 carbohydrate servings. · 15 to 20 grams at each snack. That's about the same as 1 carbohydrate serving. The Nutrition Facts label on packaged foods tells you how much carbohydrate is in a serving of the food. First, look at the serving size on the food label. Is that the amount you eat in a serving? All of the nutrition information on a food label is based on that serving size. So if you eat more or less than that, you'll need to adjust the other numbers. Total carbohydrate is the next thing you need to look for on the label. If you count carbohydrate servings, one serving of carbohydrate is 15 grams. For foods that don't come with labels, such as fresh fruits and vegetables, you'll need a guide that lists carbohydrate in these foods.  Ask your doctor, dietitian, or diabetes educator about books or other nutrition guides you can use. If you take insulin, you need to know how many grams of carbohydrate are in a meal. This lets you know how much rapid-acting insulin to take before you eat. If you use an insulin pump, you get a constant rate of insulin during the day. So the pump must be programmed at meals to give you extra insulin to cover the rise in blood sugar after meals. When you know how much carbohydrate you will eat, you can take the right amount of insulin. Or, if you always use the same amount of insulin, you need to make sure that you eat the same amount of carbohydrate at meals. If you need more help to understand carbohydrate counting and food labels, ask your doctor, dietitian, or diabetes educator. How do you get started with meal planning? Here are some tips to get started:  · Plan your meals a week at a time. Don't forget to include snacks too. · Use cookbooks or online recipes to plan several main meals. Plan some quick meals for busy nights. You also can double some recipes that freeze well. Then you can save half for other busy nights when you don't have time to cook. · Make sure you have the ingredients you need for your recipes. If you're running low on basic items, put these items on your shopping list too. · List foods that you use to make breakfasts, lunches, and snacks. List plenty of fruits and vegetables. · Post this list on the refrigerator. Add to it as you think of more things you need. · Take the list to the store to do your weekly shopping. Follow-up care is a key part of your treatment and safety. Be sure to make and go to all appointments, and call your doctor if you are having problems. It's also a good idea to know your test results and keep a list of the medicines you take. Where can you learn more? Go to http://macy-ana m.info/.   Linda Grier in the search box to learn more about \"Learning About Meal Planning for Diabetes. \"  Current as of: July 25, 2018  Content Version: 12.1  © 4427-9308 [employer]. Care instructions adapted under license by [employer] (which disclaims liability or warranty for this information). If you have questions about a medical condition or this instruction, always ask your healthcare professional. [address] any warranty or liability for your use of this information. Learning About Diabetes Food Guidelines  Your Care Instructions    Meal planning is important to manage diabetes. It helps keep your blood sugar at a target level (which you set with your doctor). You don't have to eat special foods. You can eat what your family eats, including sweets once in a while. But you do have to pay attention to how often you eat and how much you eat of certain foods. You may want to work with a dietitian or a certified diabetes educator (CDE) to help you plan meals and snacks. A dietitian or CDE can also help you lose weight if that is one of your goals. What should you know about eating carbs? Managing the amount of carbohydrate (carbs) you eat is an important part of healthy meals when you have diabetes. Carbohydrate is found in many foods. · Learn which foods have carbs. And learn the amounts of carbs in different foods. ? Bread, cereal, pasta, and rice have about 15 grams of carbs in a serving. A serving is 1 slice of bread (1 ounce), ½ cup of cooked cereal, or 1/3 cup of cooked pasta or rice. ? Fruits have 15 grams of carbs in a serving. A serving is 1 small fresh fruit, such as an apple or orange; ½ of a banana; ½ cup of cooked or canned fruit; ½ cup of fruit juice; 1 cup of melon or raspberries; or 2 tablespoons of dried fruit. ? Milk and no-sugar-added yogurt have 15 grams of carbs in a serving. A serving is 1 cup of milk or 2/3 cup of no-sugar-added yogurt.   ? Starchy vegetables have 15 grams of carbs in a serving. A serving is ½ cup of mashed potatoes or sweet potato; 1 cup winter squash; ½ of a small baked potato; ½ cup of cooked beans; or ½ cup cooked corn or green peas. · Learn how much carbs to eat each day and at each meal. A dietitian or CDE can teach you how to keep track of the amount of carbs you eat. This is called carbohydrate counting. · If you are not sure how to count carbohydrate grams, use the Plate Method to plan meals. It is a good, quick way to make sure that you have a balanced meal. It also helps you spread carbs throughout the day. ? Divide your plate by types of foods. Put non-starchy vegetables on half the plate, meat or other protein food on one-quarter of the plate, and a grain or starchy vegetable in the final quarter of the plate. To this you can add a small piece of fruit and 1 cup of milk or yogurt, depending on how many carbs you are supposed to eat at a meal.  · Try to eat about the same amount of carbs at each meal. Do not \"save up\" your daily allowance of carbs to eat at one meal.  · Proteins have very little or no carbs per serving. Examples of proteins are beef, chicken, turkey, fish, eggs, tofu, cheese, cottage cheese, and peanut butter. A serving size of meat is 3 ounces, which is about the size of a deck of cards. Examples of meat substitute serving sizes (equal to 1 ounce of meat) are 1/4 cup of cottage cheese, 1 egg, 1 tablespoon of peanut butter, and ½ cup of tofu. How can you eat out and still eat healthy? · Learn to estimate the serving sizes of foods that have carbohydrate. If you measure food at home, it will be easier to estimate the amount in a serving of restaurant food. · If the meal you order has too much carbohydrate (such as potatoes, corn, or baked beans), ask to have a low-carbohydrate food instead. Ask for a salad or green vegetables.   · If you use insulin, check your blood sugar before and after eating out to help you plan how much to eat in the future. · If you eat more carbohydrate at a meal than you had planned, take a walk or do other exercise. This will help lower your blood sugar. What else should you know? · Limit saturated fat, such as the fat from meat and dairy products. This is a healthy choice because people who have diabetes are at higher risk of heart disease. So choose lean cuts of meat and nonfat or low-fat dairy products. Use olive or canola oil instead of butter or shortening when cooking. · Don't skip meals. Your blood sugar may drop too low if you skip meals and take insulin or certain medicines for diabetes. · Check with your doctor before you drink alcohol. Alcohol can cause your blood sugar to drop too low. Alcohol can also cause a bad reaction if you take certain diabetes medicines. Follow-up care is a key part of your treatment and safety. Be sure to make and go to all appointments, and call your doctor if you are having problems. It's also a good idea to know your test results and keep a list of the medicines you take. Where can you learn more? Go to http://macy-ana m.info/. Enter I460 in the search box to learn more about \"Learning About Diabetes Food Guidelines. \"  Current as of: July 25, 2018  Content Version: 12.1  © 9537-9380 Healthwise, Incorporated. Care instructions adapted under license by [employer] (which disclaims liability or warranty for this information). If you have questions about a medical condition or this instruction, always ask your healthcare professional. [place] [address] any warranty or liability for your use of this information. Metformin (By mouth)   Metformin Hydrochloride (met-FOR-min yesi-droe-KLOR-jaja)  Treats type 2 diabetes. Brand Name(s): DM2, Fortamet, Glucophage, Glucophage XR, Glumetza, Riomet   There may be other brand names for this medicine. When This Medicine Should Not Be Used: This medicine is not right for everyone.  Do not use if you had an allergic reaction to metformin. How to Use This Medicine:   Liquid, Tablet, Long Acting Tablet  · Take your medicine as directed. Your dose may need to be changed several times to find what works best for you. · It is best to take this medicine with food or milk. · Swallow the extended-release tablet whole. Do not crush, break, or chew it. Tell your doctor if you have trouble swallowing the tablets whole. · Measure the oral liquid medicine with a marked measuring spoon, oral syringe, or medicine cup. · Read and follow the patient instructions that come with this medicine. Talk to your doctor or pharmacist if you have any questions. · Missed dose: Take a dose as soon as you remember. If it is almost time for your next dose, wait until then and take a regular dose. Do not take extra medicine to make up for a missed dose. · Store the medicine in a closed container at room temperature, away from heat, moisture, and direct light. Drugs and Foods to Avoid:   Ask your doctor or pharmacist before using any other medicine, including over-the-counter medicines, vitamins, and herbal products. · Some medicines can affect how metformin works. Tell your doctor if you are using any of the following:  ¨ Acetazolamide, dichlorphenamide, dolutegravir, isoniazid, nicotinic acid, phenytoin, ranolazine, topiramate, vandetanib, zonisamide  ¨ Birth control pills  ¨ Blood pressure medicine  ¨ Diuretic (water pill)  ¨ Phenothiazine medicine  ¨ Steroid medicine  ¨ Thyroid medicine  · Do not drink alcohol while you are using this medicine. Warnings While Using This Medicine:   · Tell your doctor if you are pregnant or breastfeeding, or if you have kidney disease, liver disease, heart or blood vessel disease, heart failure, blood circulation problems, anemia, metabolic acidosis, an adrenal gland or pituitary gland disorder, vitamin B12 deficiency, or had a heart attack.  Tell your doctor if you drink alcohol. · Too much of this medicine can cause a rare, but serious condition called lactic acidosis. · Part of the extended-release tablet may pass in your stool. This is normal.  · Make sure any doctor or dentist who treats you knows that you are using this medicine. You may need to stop using this medicine before you have surgery, an x-ray, CT scan, or other medical test.  · Your doctor will do lab tests at regular visits to check on the effects of this medicine. Keep all appointments. · Keep all medicine out of the reach of children. Never share your medicine with anyone. Possible Side Effects While Using This Medicine:   Call your doctor right away if you notice any of these side effects:  · Allergic reaction: Itching or hives, swelling in your face or hands, swelling or tingling in your mouth or throat, chest tightness, trouble breathing  · Confusion, fast heartbeat, increased hunger, shakiness  · Fever or chills  · Stomach pain, nausea, vomiting, muscle pain or cramping  · Trouble breathing, slow heartbeat, lightheadedness, dizziness  · Unusual tiredness or weakness  If you notice these less serious side effects, talk with your doctor:   · Diarrhea, gas  If you notice other side effects that you think are caused by this medicine, tell your doctor. Call your doctor for medical advice about side effects. You may report side effects to FDA at 2-093-FDA-9748  © 2017 [facility] Information is for End User's use only and may not be sold, redistributed or otherwise used for commercial purposes. The above information is an  only. It is not intended as medical advice for individual conditions or treatments. Talk to your doctor, nurse or pharmacist before following any medical regimen to see if it is safe and effective for you. Losartan (By mouth)   Losartan (jayde-RAJEEV-tan)  Treats high blood pressure.  Reduces the risk of stroke in patients with high blood pressure and an enlarged heart. Treats kidney disease in patients with diabetes. This medicine is an angiotensin receptor blocker (ARB). Brand Name(s): Cozaar   There may be other brand names for this medicine. When This Medicine Should Not Be Used: This medicine is not right for everyone. Do not use it if you had an allergic reaction to losartan, or if you are pregnant. Do not use this medicine together with aliskiren if you have diabetes. How to Use This Medicine:   Tablet  · Take your medicine as directed. Your dose may need to be changed several times to find what works best for you. · Drink plenty of fluids if you exercise, sweat more than usual, or have diarrhea or vomiting. · Read and follow the patient instructions that come with this medicine. Talk to your doctor or pharmacist if you have any questions. · Missed dose: Take a dose as soon as you remember. If it is almost time for your next dose, wait until then and take a regular dose. Do not take extra medicine to make up for a missed dose. · Store the medicine in a closed container at room temperature, away from heat, moisture, and direct light. Drugs and Foods to Avoid:   Ask your doctor or pharmacist before using any other medicine, including over-the-counter medicines, vitamins, and herbal products. · Some medicines can affect how losartan works. Tell your doctor if you are using any of the following:   ¨ Lithium  ¨ Rifampin  ¨ A diuretic (water pill), such as spironolactone, triamterene, or amiloride  ¨ NSAID pain or arthritis medicine, such as aspirin, diclofenac, ibuprofen, or naproxen  ¨ Another blood pressure medicine, such as aliskiren, benazepril, enalapril, or lisinopril  · Ask your doctor before you use any medicine, supplement, or salt substitute that contains potassium. Warnings While Using This Medicine:   · It is not safe to take this medicine during pregnancy. It could harm an unborn baby.  Tell your doctor right away if you become pregnant. · Tell your doctor if you are breastfeeding, or if you have kidney disease, liver disease, congestive heart failure, or diabetes. Tell your doctor if you have had angioedema that was caused by a blood pressure medicine. · This medicine could lower your blood pressure too much, especially when you first use it or if you are dehydrated. Stand or sit up slowly if you feel lightheaded or dizzy. · Your doctor will do lab tests at regular visits to check on the effects of this medicine. Keep all appointments. · Keep all medicine out of the reach of children. Never share your medicine with anyone. Possible Side Effects While Using This Medicine:   Call your doctor right away if you notice any of these side effects:  · Allergic reaction: Itching or hives, swelling in your face or hands, swelling or tingling in your mouth or throat, chest tightness, trouble breathing  · Change in how much or how often you urinate  · Confusion, weakness, uneven heartbeat, trouble breathing, numbness or tingling in your hands, feet, or lips  · Lightheadedness, dizziness, fainting  · Rapid weight gain, swelling in your hands, ankles, or feet  If you notice these less serious side effects, talk with your doctor:   · Diarrhea  · Tiredness  If you notice other side effects that you think are caused by this medicine, tell your doctor. Call your doctor for medical advice about side effects. You may report side effects to FDA at 5-108-FDA-2285  © 2017 [facility] Information is for End User's use only and may not be sold, redistributed or otherwise used for commercial purposes. The above information is an  only. It is not intended as medical advice for individual conditions or treatments. Talk to your doctor, nurse or pharmacist before following any medical regimen to see if it is safe and effective for you.         Ergocalciferol (Calcidol, Calciferol, Drisdol) - (By mouth)   Why this medicine is used: Increases the amount of vitamin D in your body. Contact a nurse or doctor right away if you have:  · Irregular heartbeat  · Bone pain  · Cloudy urine  · Weakness, tiredness, headache     Common side effects:  · Stomach pain, nausea and vomiting, diarrhea or constipation  © 2017 [facility] Information is for End User's use only and may not be sold, redistributed or otherwise used for commercial purposes. Reducing Heart Attack Risk With Daily Medicine: Care Instructions  Your Care Instructions    If you are at risk for a heart attack, there are many medicines that can reduce your risk. These include:  · ACE inhibitors or ARBs. These are types of blood pressure medicines. They can reduce the risk of heart attacks and strokes if you are at high risk. · Statins and other cholesterol medicines. These lower cholesterol. They can also reduce the risk of a stroke. · Aspirin and other antiplatelets. These prevent blood clots. They can help certain people lower their risk of a heart attack or stroke. · Beta-blocker medicines. These are a type of blood pressure and heart medicine. They can reduce the chance of early death if you have had a heart attack. All medicines can cause side effects. So it is important to understand the pros and cons of any medicine you take. It is also important to take your medicines exactly as your doctor tells you to. Follow-up care is a key part of your treatment and safety. Be sure to make and go to all appointments, and call your doctor if you are having problems. It's also a good idea to know your test results and keep a list of the medicines you take. ACE inhibitors  ACE (angiotensin-converting enzyme) inhibitors are used for three main reasons. They lower blood pressure, protect the kidneys, and prevent heart attacks and strokes. Examples include benazepril (Lotensin), lisinopril (Prinivil, Zestril), and ramipril (Altace).   An angiotensin II receptor blocker (ARB) may be used instead of an ACE inhibitor. ARBs help you in the same ways as ACE inhibitors. Examples include candesartan (Atacand), irbesartan (Avapro), and losartan (Cozaar). Before you start taking an ACE inhibitor or an ARB, make sure your doctor knows if:  · You are taking a water pill (diuretic). · You are taking potassium pills or using salt substitutes. · You are pregnant or breastfeeding. · You have had a kidney transplant or other kidney problems. ACE inhibitors and ARBs can cause side effects. Call your doctor right away if you have:  · Trouble breathing. · Swelling in your face, head, neck, or tongue. · Dizziness or lightheadedness. · A dry cough. Statins  Statins lower cholesterol. Examples include atorvastatin (Lipitor), lovastatin (Mevacor), pravastatin (Pravachol), and simvastatin (Zocor). Before you start taking a statin, make sure your doctor knows if:  · You have had a kidney transplant or other kidney problems. · You have liver disease. · You take any other prescription medicine, over-the-counter medicine, vitamins, supplements, or herbal remedies. · You are pregnant or breastfeeding. Statins can cause side effects. Call your doctor right away if you have:  · New, severe muscle aches. · Brown urine. Aspirin  Taking an aspirin every day can lower your risk for a heart attack. A heart attack occurs when a blood vessel in the heart gets blocked. When this happens, oxygen can't get to the heart muscle, and part of the heart dies. Aspirin can help prevent blood clots that can block the blood vessels. Talk to your doctor before you start taking aspirin every day. He or she may recommend that you take one low-dose aspirin (81 mg) tablet each day, with a meal and a full glass of water. Taking aspirin isn't right for everyone. This is because it can cause serious bleeding. And you may not be able to use aspirin if you:  · Have asthma.   · Have an ulcer or other stomach problem. · Take some other medicine (called a blood thinner) that prevents blood clots. · Are allergic to aspirin. Before having a surgery or procedure, tell your doctor or dentist that you take aspirin. He or she will tell you if you should stop taking aspirin beforehand. Make sure that you understand exactly what your doctor wants you to do. Aspirin can cause side effects. Call your doctor right away if you have:  · Unusual bleeding or bruising. · Nausea, vomiting, or heartburn. · Black or bloody stools. Beta-blockers  Beta-blockers are used for three main reasons. They lower blood pressure, relieve angina symptoms (such as chest pain or pressure), and reduce the chances of a second heart attack. They include atenolol (Tenormin), carvedilol (Coreg), and metoprolol (Lopressor). Before you start taking a beta-blocker, make sure your doctor knows if you have:  · Severe asthma or frequent asthma attacks. · A very slow pulse (less than 55 beats a minute). Beta-blockers can cause side effects. Call your doctor right away if you have:  · Wheezing or trouble breathing. · Dizziness or lightheadedness. · Asthma that gets worse. When should you call for help? Watch closely for changes in your health, and be sure to contact your doctor if you have any problems. Where can you learn more? Go to http://macy-ana m.info/. Enter R428 in the search box to learn more about \"Reducing Heart Attack Risk With Daily Medicine: Care Instructions. \"  Current as of: July 22, 2018  Content Version: 12.1  © 9690-2534 [employer], [employer]. Care instructions adapted under license by [employer] (which disclaims liability or warranty for this information). If you have questions about a medical condition or this instruction, always ask your healthcare professional. [place] [address] any warranty or liability for your use of this information.        Your primary care doctor with set you up to follow with ophthalmology and podiatry for further care related to your diabetes. Information obtained by :  I understand that if any problems occur once I am at home I am to contact my physician. I understand and acknowledge receipt of the instructions indicated above.                                                                                                                                            Physician's or R.N.'s Signature                                                                  Date/Time                                                                                                                                              Patient or Representative Signature                                                          Date/Time    Rom Bryant MD  7/30/2019  2:25 PM

## 2019-07-30 NOTE — PROGRESS NOTES
NUTRITION    Nutrition Consult: Diet Education    RECOMMENDATIONS / PLAN:     No additional nutritional interventions indicated at this time. Please consult nutrition if further nutrtion intervention is needed. Will re-screen this patient per policy. NUTRITION INTERVENTIONS:     [x] Meals/Snacks: modified composition  [x] Nutrition counseling/education: diabetic diet education     ASSESSMENT:     Diet: DIET DIABETIC CONSISTENT CARB Regular  Po intake:  [x]  Good    []  Fair    []  Poor  Weight History:  [x] No unintentional weight loss PTA    [] Had weight change:    Nutrition Risk: None identified at this time     Provided Education On: diabetic diet   Person(s) Instructed:  [x]  Patient    [] Family    [] Other:  Education Methods Used:  [x] Explanation    [] Handout    [] Other:   Patients Readiness:  [] Veronica Cordova    [x] Acceptance    [] Non-Acceptance    [] Refused  Learning Limitations:   [x] None identified    [] Identified:  Level of Comprehension:  [x] Good    [] Needs Reinforcement     Additional Concerns/Comments:  Handouts and contact information provided.      Follow-up education indicated:   [x] No    [] Yes  Discharge needs: [x] None identified    [] Identified and addressed    Andrew Pereyra RD, [address]   Pager: [phone]

## 2019-07-30 NOTE — ROUTINE PROCESS
Received patient from [facility] with diagnosed with new onset DM nonketotic Hyperosmolar on Insulin Gtt. Placed patient on [employer]. Blood sugar upon arrival in the unit is 335 and 264. Dr. Edwards Samples came and see patient and D/C Insulin Gtt. NS #2 L given bolus as ordered, then will at 100ml/Hr.

## 2019-08-02 ENCOUNTER — HOME CARE VISIT (OUTPATIENT)
Dept: HOME HEALTH SERVICES | Facility: HOME HEALTH | Age: 54
End: 2019-08-02

## 2019-08-26 ENCOUNTER — TELEPHONE (OUTPATIENT)
Dept: ORTHOPEDIC SURGERY | Facility: CLINIC | Age: 54
End: 2019-08-26

## 2019-08-26 NOTE — TELEPHONE ENCOUNTER
Letter written and patient notified her letter is ready for  at the [facility] location.

## 2019-08-26 NOTE — TELEPHONE ENCOUNTER
Patient called stating that she recently moved to an apartment and it is on the third floor but she needs a letter stating that she needs to have an apartment on the first floor due to her knee problems. She was not sure if she needs to wait until she sees [facility] or if [facility] can write one up for her.  Please advise patient at [phone]

## 2019-09-19 ENCOUNTER — OFFICE VISIT (OUTPATIENT)
Dept: ORTHOPEDIC SURGERY | Facility: CLINIC | Age: 54
End: 2019-09-19

## 2019-09-19 VITALS
RESPIRATION RATE: 18 BRPM | OXYGEN SATURATION: 99 % | TEMPERATURE: 98.2 F | HEIGHT: 58 IN | HEART RATE: 85 BPM | WEIGHT: 196.8 LBS | BODY MASS INDEX: 41.31 KG/M2 | DIASTOLIC BLOOD PRESSURE: 80 MMHG | SYSTOLIC BLOOD PRESSURE: 128 MMHG

## 2019-09-19 DIAGNOSIS — M17.11 PRIMARY OSTEOARTHRITIS OF RIGHT KNEE: Primary | ICD-10-CM

## 2019-09-19 DIAGNOSIS — E11.8 TYPE 2 DIABETES MELLITUS WITH COMPLICATION, UNSPECIFIED WHETHER LONG TERM INSULIN USE: ICD-10-CM

## 2019-09-19 DIAGNOSIS — M17.0 PRIMARY OSTEOARTHRITIS OF BOTH KNEES: ICD-10-CM

## 2019-09-19 RX ORDER — ACETAMINOPHEN 500 MG
TABLET ORAL
COMMUNITY
End: 2022-10-23

## 2019-09-19 RX ORDER — OMEPRAZOLE 20 MG/1
20 CAPSULE, DELAYED RELEASE ORAL DAILY
COMMUNITY

## 2019-09-19 RX ORDER — BETAMETHASONE SODIUM PHOSPHATE AND BETAMETHASONE ACETATE 3; 3 MG/ML; MG/ML
6 INJECTION, SUSPENSION INTRA-ARTICULAR; INTRALESIONAL; INTRAMUSCULAR; SOFT TISSUE ONCE
Qty: 1 ML | Refills: 0
Start: 2019-09-19 | End: 2019-09-19

## 2019-09-19 NOTE — PROGRESS NOTES
Patient: Evette Garcia                MRN: [id number]       SSN: [id number]  YOB: 1965        AGE: 48 y.o. SEX: female  Body mass index is 41.13 kg/m². PCP: Stoney Brock NP  09/19/19      HISTORY:  I had the pleasure of reviewing the patient for opinion and advice with regard to her bilateral knee pain. It is worse on the right than on the left. She has gotten into varus on the right, and valgus on the left. The right one hurts the most.  She is a newly diagnosed diabetic. Her A1c was about 9.5 in the springtime. We will repeat that for her. She is very interested in having her knees replaced. 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 The pain is moderate, usually nonradicular. A 12-point review of systems was performed today. Positives noted and updated. All other systems were reviewed and are otherwise negative. PHYSICAL EXAMINATION:  On examination today, she is very pleasant. She is short statured at 4 feet, 10 inches. She has lost weight. Her BMI is 41 which is terrific. She is feeling better as well. She is on Metformin. The hips rotate nicely. Both feet are warm and well perfused. She has a couple degree fixed flexion deformity of each knee and bends fairly well to about 105 degrees. Calf nontender. No foot drop. Just slight evidence of neuropathy distally with sharp testing, but mainly intact. No cyanosis, peripheral edema or clubbing. She has joint line tenderness in all 3 compartments. Slight effusion and quads are intact. She has 1+ ACLs. RADIOGRAPHS:  X-rays confirm end-stage arthritis really of both knees, varus on the right and valgus on the left. PLAN: Today in the office, after informed consent, under aseptic conditions, the right knee was injected with 6mg of celestone. Injection was well tolerated. I think she will do well with surgery when the time comes. I would like her to get an A1c level.   We will do this for her. We will see her back in a few weeks, and we will inject the left knee when she returns. We will certainly try to maximize her nonoperative management. When her A1c is under 7.5 and BMI is under 40 and she would like surgery, I would be very happy to do this for her. It has been a pleasure to share in her care. Thank you for the opportunity. I will send a copy of the A1c to you as well. CC:  Sumeet Valentine MD         REVIEW OF SYSTEMS:      CON: negative for weight loss, fever  EYE: negative for double vision  ENT: negative for hoarseness  RS:   negative for Tb  GI:    negative for blood in stool  :  negative for blood in urine  Other systems reviewed and noted below. Past Medical History:   Diagnosis Date    BMI 38.0-38.9,adult 5/19/2017    BMI 40.0-44.9, adult ([address] 5/19/2017    Obesity (BMI 35.0-39.9 without comorbidity) 4/10/2017    Osteoarthritis     Ulcer     gi ulcer       Family History   Problem Relation Age of Onset    Arthritis-osteo Other        Current Outpatient Medications   Medication Sig Dispense Refill    omeprazole (PRILOSEC) 20 mg capsule Take 20 mg by mouth daily.  acetaminophen (TYLENOL EXTRA STRENGTH) 500 mg tablet Take  by mouth every six (6) hours as needed for Pain.  atorvastatin (LIPITOR) 20 mg tablet Take 1 Tab by mouth nightly. Indications: primary prevention of heart attack 90 Tab 2    losartan (COZAAR) 25 mg tablet Take 1 Tab by mouth daily. Indications: high blood pressure 30 Tab 3    metFORMIN (GLUCOPHAGE) 850 mg tablet Take 1 Tab by mouth two (2) times daily (with meals). Indications: type 2 diabetes mellitus 180 Tab 0    cyanocobalamin (VITAMIN B12) 100 mcg tablet Take 100 mcg by mouth daily.  ferrous sulfate (IRON) 325 mg (65 mg iron) tablet Take  by mouth Daily (before breakfast).  ergocalciferol (ERGOCALCIFEROL) 50,000 unit capsule Take 1 Cap by mouth every seven (7) days.  Indications: vitamin D deficiency 12 Cap 0    diclofenac (VOLTAREN) 1 % gel Apply  to affected area four (4) times daily. Apply 4 g to affected area topically of the bilateral knees up to 4 times a day. 100 g 3    cyclobenzaprine (FLEXERIL) 10 mg tablet Take 1 Tab by mouth three (3) times daily as needed for Muscle Spasm(s).  15 Tab 0       Allergies   Allergen Reactions    Latex Rash    Penicillins Other (comments)       Past Surgical History:   Procedure Laterality Date    ABDOMEN SURGERY PROC UNLISTED      hernia    HX GYN      D&C       Social History     Socioeconomic History    Marital status: SINGLE     Spouse name: Not on file    Number of children: Not on file    Years of education: Not on file    Highest education level: Not on file   Occupational History    Not on file   Social Needs    Financial resource strain: Not on file    Food insecurity:     Worry: Not on file     Inability: Not on file    Transportation needs:     Medical: Not on file     Non-medical: Not on file   Tobacco Use    Smoking status: Current Every Day Smoker     Packs/day: 0.25     Years: 15.00     Pack years: 3.75    Smokeless tobacco: Never Used   Substance and Sexual Activity    Alcohol use: Yes     Comment: occasionally    Drug use: No    Sexual activity: Not on file   Lifestyle    Physical activity:     Days per week: Not on file     Minutes per session: Not on file    Stress: Not on file   Relationships    Social connections:     Talks on phone: Not on file     Gets together: Not on file     Attends Latter-day service: Not on file     Active member of club or organization: Not on file     Attends meetings of clubs or organizations: Not on file     Relationship status: Not on file    Intimate partner violence:     Fear of current or ex partner: Not on file     Emotionally abused: Not on file     Physically abused: Not on file     Forced sexual activity: Not on file   Other Topics Concern    Not on file   Social History Narrative    Not on file Visit Vitals  /80   Pulse 85   Temp 98.2 °F (36.8 °C) (Oral)   Resp 18   Ht 4' 10\" (1.473 m)   Wt 196 lb 12.8 oz (89.3 kg)   SpO2 99%   BMI 41.13 kg/m²         PHYSICAL EXAMINATION:  GENERAL: Alert and oriented x3, in no acute distress, well-developed, well-nourished, afebrile. HEART: No JVD. EYES: No scleral icterus   NECK: No significant lymphadenopathy   LUNGS: No respiratory compromise or indrawing  ABDOMEN: Soft, non-tender, non-distended. Electronically signed by:  Chinyere Hoyt MD

## 2019-09-20 ENCOUNTER — HOSPITAL ENCOUNTER (OUTPATIENT)
Dept: LAB | Age: 54
Discharge: HOME | End: 2019-09-20
Payer: SELF-PAY

## 2019-09-20 DIAGNOSIS — E11.8 TYPE 2 DIABETES MELLITUS WITH COMPLICATION, UNSPECIFIED WHETHER LONG TERM INSULIN USE: ICD-10-CM

## 2019-09-20 LAB
EST. AVERAGE GLUCOSE BLD GHB EST-MCNC: 214 MG/DL
HBA1C MFR BLD: 9.1 % (ref 4.2–5.6)

## 2019-09-20 PROCEDURE — 36415 COLL VENOUS BLD VENIPUNCTURE: CPT

## 2019-09-20 PROCEDURE — 83036 HEMOGLOBIN GLYCOSYLATED A1C: CPT

## 2019-10-03 ENCOUNTER — OFFICE VISIT (OUTPATIENT)
Dept: ORTHOPEDIC SURGERY | Facility: CLINIC | Age: 54
End: 2019-10-03

## 2019-10-03 VITALS
HEIGHT: 58 IN | SYSTOLIC BLOOD PRESSURE: 119 MMHG | DIASTOLIC BLOOD PRESSURE: 62 MMHG | RESPIRATION RATE: 16 BRPM | WEIGHT: 194 LBS | OXYGEN SATURATION: 98 % | BODY MASS INDEX: 40.72 KG/M2 | HEART RATE: 79 BPM | TEMPERATURE: 98.1 F

## 2019-10-03 DIAGNOSIS — M17.0 PRIMARY OSTEOARTHRITIS OF BOTH KNEES: Primary | ICD-10-CM

## 2019-10-03 NOTE — PROGRESS NOTES
1. Have you been to the ER, urgent care clinic since your last visit? Hospitalized since your last visit? NO    2. Have you seen or consulted any other health care providers outside of the [address] [place] [address] since your last visit? Include any pap smears or colon screening.    NO

## 2019-10-03 NOTE — PROGRESS NOTES
Patient: Norah Navarro                MRN: [id number]       SSN: [id number]  YOB: 1965        AGE: 48 y.o. SEX: female  Body mass index is 40.55 kg/m². PCP: Mary Carmen Dale MD  10/03/19    HISTORY: I had the pleasure of viewing the patient today in follow-up. Both of her knees are really bad in the right knee bothers her worse, moderate pain. The right is in varus, the left is in valgus. She has lost some weight. Her body mass index is now down to 40.6, and the A1c is down to 9.1 as well. We are very pleased it is moving in the right direction. Target is less than 40 BMI and less than 7.5 on the A1c. 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 She has about 15 minutes of walking tolerance; difficulty with stairs, kneeling, getting up and down from a chair. A 12 point review of systems is performed. Pertinent positives noted. All other systems are reviewed and are otherwise negative. *    PHYSICAL EXAMINATION:  The hips rotate nicely. Both feet warm and well perfused. She is in varus on the right, valgus on the left. A degree or 2 of fixed flexion deformity. She bends quite well to about 118 to 120 degrees. Calf nontender and sensation normal.    RADIOGRAPHS:  Review the x-rays confirm severe arthritis, really of both knees. Varus on the right, valgus on the left. PLAN:  We will see her back in about 6-8 weeks' time for a body mass index check and also A1c.    CC:  Cortney Gutierrez M.D. REVIEW OF SYSTEMS:      CON: negative for weight loss, fever  EYE: negative for double vision  ENT: negative for hoarseness  RS:   negative for Tb  GI:    negative for blood in stool  :  negative for blood in urine  Other systems reviewed and noted below.           Past Medical History:   Diagnosis Date    BMI 38.0-38.9,adult 5/19/2017    BMI 40.0-44.9, adult ([facility] [address] 5/19/2017    Obesity (BMI 35.0-39.9 without comorbidity) 4/10/2017  Osteoarthritis     Ulcer     gi ulcer       Family History   Problem Relation Age of Onset    Arthritis-osteo Other        Current Outpatient Medications   Medication Sig Dispense Refill    omeprazole (PRILOSEC) 20 mg capsule Take 20 mg by mouth daily.  acetaminophen (TYLENOL EXTRA STRENGTH) 500 mg tablet Take  by mouth every six (6) hours as needed for Pain.  losartan (COZAAR) 25 mg tablet Take 1 Tab by mouth daily. Indications: high blood pressure 30 Tab 3    metFORMIN (GLUCOPHAGE) 850 mg tablet Take 1 Tab by mouth two (2) times daily (with meals). Indications: type 2 diabetes mellitus 180 Tab 0    cyanocobalamin (VITAMIN B12) 100 mcg tablet Take 100 mcg by mouth daily.  ferrous sulfate (IRON) 325 mg (65 mg iron) tablet Take  by mouth Daily (before breakfast).  cyclobenzaprine (FLEXERIL) 10 mg tablet Take 1 Tab by mouth three (3) times daily as needed for Muscle Spasm(s). 15 Tab 0    atorvastatin (LIPITOR) 20 mg tablet Take 1 Tab by mouth nightly. Indications: primary prevention of heart attack 90 Tab 2    ergocalciferol (ERGOCALCIFEROL) 50,000 unit capsule Take 1 Cap by mouth every seven (7) days. Indications: vitamin D deficiency 12 Cap 0    diclofenac (VOLTAREN) 1 % gel Apply  to affected area four (4) times daily. Apply 4 g to affected area topically of the bilateral knees up to 4 times a day.  100 g 3       Allergies   Allergen Reactions    Latex Rash    Penicillins Other (comments)       Past Surgical History:   Procedure Laterality Date    ABDOMEN SURGERY PROC UNLISTED      hernia    HX GYN      D&C       Social History     Socioeconomic History    Marital status: SINGLE     Spouse name: Not on file    Number of children: Not on file    Years of education: Not on file    Highest education level: Not on file   Occupational History    Not on file   Social Needs    Financial resource strain: Not on file    Food insecurity:     Worry: Not on file     Inability: Not on file   phorus needs:     Medical: Not on file     Non-medical: Not on file   Tobacco Use    Smoking status: Current Some Day Smoker     Packs/day: 0.25     Years: 15.00     Pack years: 3.75    Smokeless tobacco: Never Used   Substance and Sexual Activity    Alcohol use: Yes     Comment: occasionally    Drug use: No    Sexual activity: Not on file   Lifestyle    Physical activity:     Days per week: Not on file     Minutes per session: Not on file    Stress: Not on file   Relationships    Social connections:     Talks on phone: Not on file     Gets together: Not on file     Attends Sabianist service: Not on file     Active member of club or organization: Not on file     Attends meetings of clubs or organizations: Not on file     Relationship status: Not on file    Intimate partner violence:     Fear of current or ex partner: Not on file     Emotionally abused: Not on file     Physically abused: Not on file     Forced sexual activity: Not on file   Other Topics Concern    Not on file   Social History Narrative    Not on file       Visit Vitals  /62 (BP 1 Location: Left arm, BP Patient Position: Sitting)   Pulse 79   Temp 98.1 °F (36.7 °C) (Oral)   Resp 16   Ht 4' 10\" (1.473 m)   Wt 194 lb (88 kg)   SpO2 98%   BMI 40.55 kg/m²         PHYSICAL EXAMINATION:  GENERAL: Alert and oriented x3, in no acute distress, well-developed, well-nourished, afebrile. HEART: No JVD. EYES: No scleral icterus   NECK: No significant lymphadenopathy   LUNGS: No respiratory compromise or indrawing  ABDOMEN: Soft, non-tender, non-distended. Electronically signed by:  Rin Ardon MD

## 2020-02-12 LAB — MAMMOGRAPHY, EXTERNAL: NORMAL

## 2021-06-06 ENCOUNTER — HOSPITAL ENCOUNTER (EMERGENCY)
Age: 56
Discharge: HOME | End: 2021-06-06
Attending: EMERGENCY MEDICINE

## 2021-06-06 VITALS
OXYGEN SATURATION: 100 % | RESPIRATION RATE: 16 BRPM | HEIGHT: 58 IN | TEMPERATURE: 98.3 F | SYSTOLIC BLOOD PRESSURE: 135 MMHG | DIASTOLIC BLOOD PRESSURE: 82 MMHG | WEIGHT: 230 LBS | BODY MASS INDEX: 48.28 KG/M2 | HEART RATE: 80 BPM

## 2021-06-06 DIAGNOSIS — S09.90XA CLOSED HEAD INJURY, INITIAL ENCOUNTER: ICD-10-CM

## 2021-06-06 DIAGNOSIS — S39.012A BACK STRAIN, INITIAL ENCOUNTER: ICD-10-CM
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
## 2021-06-06 PROCEDURE — 99283 EMERGENCY DEPT VISIT LOW MDM: CPT

## 2021-06-06 RX ORDER — OXYCODONE AND ACETAMINOPHEN 5; 325 MG/1; MG/1
1 TABLET ORAL
Qty: 12 TABLET | Refills: 0 | Status: SHIPPED | OUTPATIENT
Start: 2021-06-06 | End: 2021-06-09

## 2021-06-06 RX ORDER — METAXALONE 800 MG/1
800 TABLET ORAL 4 TIMES DAILY
Qty: 20 TABLET | Refills: 0 | Status: SHIPPED | OUTPATIENT
Start: 2021-06-06 | End: 2021-06-11

## 2021-06-06 NOTE — LETTER
NOTIFICATION RETURN TO WORK / SCHOOL 
 
6/6/2021 4:09 PM 
 
Ms. Erica Arellano [address] [place] [address] To Whom It May Concern: 
 
Erica Arellano is currently under the care of [id number] [facility] EMERGENCY DEPT. She will return to work/school on: 6/10/21. If there are questions or concerns please have the patient contact our office.  
 
 
 
Sincerely, 
 
 
Eyad Lantigua PA-C
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
## 2021-06-06 NOTE — ROUTINE PROCESS
Salina Lin is a 50 y.o. female that was discharged in stable. Pt was accompanied by self. Pt is not driving. The patients diagnosis, condition and treatment were explained to  patient and aftercare instructions were given. The patient verbalized understanding. Patient armband removed and shredded.

## 2021-12-23 ENCOUNTER — TRANSCRIBE ORDER (OUTPATIENT)
Dept: SCHEDULING | Age: 56
End: 2021-12-23

## 2021-12-23 DIAGNOSIS — Z12.39 SCREENING FOR BREAST CANCER: Primary | ICD-10-CM

## 2022-03-19 PROBLEM — I10 ESSENTIAL HYPERTENSION: Status: ACTIVE | Noted: 2019-07-30

## 2022-03-19 PROBLEM — E11.00 HYPEROSMOLAR NON-KETOTIC STATE IN PATIENT WITH TYPE 2 DIABETES MELLITUS (HCC): Status: ACTIVE | Noted: 2019-07-30

## 2022-03-19 PROBLEM — E66.9 OBESITY (BMI 35.0-39.9 WITHOUT COMORBIDITY): Status: ACTIVE | Noted: 2017-04-10

## 2022-03-19 PROBLEM — D64.9 SYMPTOMATIC ANEMIA: Status: ACTIVE | Noted: 2018-06-19

## 2022-03-19 PROBLEM — E11.9 NEW ONSET TYPE 2 DIABETES MELLITUS (HCC): Status: ACTIVE | Noted: 2019-07-30

## 2022-03-20 PROBLEM — N28.9 ACUTE KIDNEY INSUFFICIENCY: Status: ACTIVE | Noted: 2019-07-30

## 2022-07-05 NOTE — PROGRESS NOTES
HISTORY:  Ms. Tona Person returns to the office for follow-up regarding her bilateral knees. She has done well from her aspiration/injection previously. The patient does use tramadol intermittently for symptom management. She is working full duty. EXAMINATION:  Both the knees reveal no warmth, erythema, edema, ecchymosis, or effusion. There is motion noted symmetrically at the knees at 105° -5 today. She extends with a varus deformity of both knees. No calf tenderness or evidence of DVT. PLAN:  At this point she is doing well from her aspiration/injection. We will continue to manage her pain and symptoms conservatively with cortisone injections and aspirations as necessary. She is going to follow back on a p.r.n. basis. She was refilled of her tramadol today. Patient: Sonia Bangura    Procedure: Procedure(s):  right image-guided endoscopic revision frontal sinusotomy, total ethmoidectomy, maxillary antrostomy with tissue removal,       Anesthesia Type:  General    Note:  Disposition: Outpatient   Postop Pain Control: Uneventful            Sign Out: Well controlled pain   PONV: No   Neuro/Psych: Uneventful            Sign Out: Acceptable/Baseline neuro status   Airway/Respiratory: Uneventful            Sign Out: Acceptable/Baseline resp. status   CV/Hemodynamics: Uneventful            Sign Out: Acceptable CV status; No obvious hypovolemia; No obvious fluid overload   Other NRE: NONE   DID A NON-ROUTINE EVENT OCCUR? No           Last vitals:  Vitals Value Taken Time   /70 07/05/22 1200   Temp     Pulse 69 07/05/22 1207   Resp 10 07/05/22 1207   SpO2 98 % 07/05/22 1207   Vitals shown include unvalidated device data.    Electronically Signed By: Gerald Carney MD  July 5, 2022  12:09 PM

## 2022-07-07 ENCOUNTER — HOSPITAL ENCOUNTER (EMERGENCY)
Age: 57
Discharge: HOME | End: 2022-07-07
Attending: EMERGENCY MEDICINE

## 2022-07-07 VITALS
WEIGHT: 230 LBS | DIASTOLIC BLOOD PRESSURE: 86 MMHG | BODY MASS INDEX: 48.07 KG/M2 | RESPIRATION RATE: 16 BRPM | OXYGEN SATURATION: 100 % | SYSTOLIC BLOOD PRESSURE: 146 MMHG | TEMPERATURE: 99 F | HEART RATE: 78 BPM

## 2022-07-07 DIAGNOSIS — S39.012A LUMBAR SPINE STRAIN, INITIAL ENCOUNTER: Primary | ICD-10-CM

## 2022-07-07 PROCEDURE — 99283 EMERGENCY DEPT VISIT LOW MDM: CPT

## 2022-07-07 PROCEDURE — 74011250637 HC RX REV CODE- 250/637: Performed by: EMERGENCY MEDICINE

## 2022-07-07 RX ORDER — IBUPROFEN 600 MG/1
600 TABLET ORAL
Status: COMPLETED | OUTPATIENT
Start: 2022-07-07 | End: 2022-07-07

## 2022-07-07 RX ORDER — CYCLOBENZAPRINE HCL 10 MG
10 TABLET ORAL
Qty: 15 TABLET | Refills: 0 | Status: SHIPPED | OUTPATIENT
Start: 2022-07-07 | End: 2022-10-23

## 2022-07-07 RX ORDER — CYCLOBENZAPRINE HCL 10 MG
10 TABLET ORAL
Qty: 20 TABLET | Refills: 0 | Status: SHIPPED | OUTPATIENT
Start: 2022-07-07 | End: 2022-07-07 | Stop reason: SDUPTHER

## 2022-07-07 RX ADMIN — IBUPROFEN 600 MG: 600 TABLET ORAL at 21:19

## 2022-07-07 NOTE — Clinical Note
[facility] EMERGENCY DEPT  [address] [facility] [id number] [phone]    Work/School Note    Date: 7/7/2022    To Whom It May concern:    Cristela Escobar was seen and treated today in the emergency room by the following provider(s):  Attending Provider: Simran James MD.      Cristela Escobar is excused from work/school on 7/7/2022 through 7/9/2022. She is medically clear to return to work/school on 7/10/2022.          Sincerely,          Shane Ramirez MD

## 2022-07-07 NOTE — Clinical Note
[address] [place] [address] [address] [facility] EMERGENCY DEPT  [address] [facility] [address] [phone]    Work/School Note    Date: 7/7/2022    To Whom It May concern:    Tom Bhatt was seen and treated today in the emergency room by the following provider(s):  Attending Provider: Chanda Johnson MD.      Tom Bhatt is excused from work/school on 7/7/2022 through 7/9/2022. She is medically clear to return to work/school on 7/10/2022.          Sincerely,          Neil Watson

## 2022-07-08 NOTE — DISCHARGE INSTRUCTIONS

## 2022-07-08 NOTE — ED NOTES
Patient up for discharge. Discharge instructions reviewed with patient and patient verbalized understanding of.

## 2022-07-08 NOTE — ED PROVIDER NOTES
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 No complaint. Past Medical History:   Diagnosis Date    BMI 38.0-38.9,adult 5/19/2017    BMI 40.0-44.9, adult ([address] 5/19/2017    Obesity (BMI 35.0-39.9 without comorbidity) 4/10/2017    Osteoarthritis     Ulcer     gi ulcer       Past Surgical History:   Procedure Laterality Date    HX GYN      D&C    [place] ABDOMEN SURGERY PROC UNLISTED      hernia         Family History:   Problem Relation Age of Onset    OSTEOARTHRITIS Other        Social History     Socioeconomic History    Marital status: SINGLE     Spouse name: Not on file    Number of children: Not on file    Years of education: Not on file    Highest education level: Not on file   Occupational History    Not on file   Tobacco Use    Smoking status: Current Some Day Smoker     Packs/day: 0.25     Years: 15.00     Pack years: 3.75    Smokeless tobacco: Never Used   Substance and Sexual Activity    Alcohol use: Yes     Comment: occasionally    Drug use: No    Sexual activity: Not on file   Other Topics Concern    Not on file   Social History Narrative    Not on file     Social Determinants of Health     Financial Resource Strain:     Difficulty of Paying Living Expenses: Not on file   Food Insecurity:     Worried About Running Out of Food in the Last Year: Not on file    Callum of Food in the Last Year: Not on file   Transportation Needs:     Lack of Transportation (Medical): Not on file    Lack of Transportation (Non-Medical):  Not on file   Physical Activity:     Days of Exercise per Week: Not on file    Minutes of Exercise per Session: Not on file   Stress:     Feeling of Stress : Not on file   Social Connections:     Frequency of Communication with Friends and Family: Not on file    Frequency of Social Gatherings with Friends and Family: Not on file    Attends [employer] Services: Not on file    Active Member of Clubs or Organizations: Not on file    Attends Club or Organization Meetings: Not on file    Marital Status: Not on file   Intimate Partner Violence:     Fear of Current or Ex-Partner: Not on file    Emotionally Abused: Not on file    Physically Abused: Not on file    Sexually Abused: Not on file   Housing Stability:     Unable to Pay for Housing in the Last Year: Not on file    Number of [place] in the Last Year: Not on file    Unstable Housing in the Last Year: Not on file         ALLERGIES: Latex and Penicillins    Review of Systems   Constitutional: Negative. HENT: Negative. Eyes: Negative. Respiratory: Negative. Cardiovascular: Negative. Gastrointestinal: Negative. Endocrine: Negative. Genitourinary: Negative. Musculoskeletal: Positive for back pain (lower). Skin: Negative. Allergic/Immunologic: Negative. Neurological: Negative. Hematological: Negative. Psychiatric/Behavioral: Negative. All other systems reviewed and are negative. Vitals:    07/07/22 2040   BP: (!) 146/86   Pulse: 78   Resp: 16   Temp: 99 °F (37.2 °C)   SpO2: 100%   Weight: 104.3 kg (230 lb)            Physical Exam  Vitals and nursing note reviewed. Constitutional:       General: She is not in acute distress. Appearance: She is well-developed. She is not diaphoretic. HENT:      Head: Normocephalic. Right Ear: External ear normal.      Left Ear: External ear normal.      Mouth/Throat:      Pharynx: No oropharyngeal exudate. Eyes:      General: No scleral icterus. Right eye: No discharge. Left eye: No discharge. Conjunctiva/sclera: Conjunctivae normal.      Pupils: Pupils are equal, round, and reactive to light. Neck:      Thyroid: No thyromegaly. Vascular: No JVD. Trachea: No tracheal deviation. Cardiovascular:      Rate and Rhythm: Normal rate and regular rhythm. Heart sounds: Normal heart sounds. No murmur heard.   No friction rub. No gallop. Pulmonary:      Effort: Pulmonary effort is normal. No respiratory distress. Breath sounds: Normal breath sounds. No stridor. No wheezing or rales. Chest:      Chest wall: No tenderness. Abdominal:      General: Bowel sounds are normal. There is no distension. Palpations: Abdomen is soft. There is no mass. Tenderness: There is no abdominal tenderness. There is no guarding or rebound. Musculoskeletal:         General: Tenderness (minimal tenderness over L4,5) present. Normal range of motion. Cervical back: Normal range of motion and neck supple. Lymphadenopathy:      Cervical: No cervical adenopathy. Skin:     General: Skin is warm and dry. Coloration: Skin is not pale. Findings: No erythema or rash. Neurological:      Mental Status: She is alert and oriented to person, place, and time. Cranial Nerves: No cranial nerve deficit. Motor: No abnormal muscle tone. Coordination: Coordination normal.      Deep Tendon Reflexes: Reflexes normal.          MDM       Procedures    Dx: lumbar strain    Disp; Flexeril and motrin. F/U PCP in 3 days. Return to ER prn. Dictation disclaimer:  Please note that this dictation was completed with [email or web address], the [employer] voice recognition software. Quite often unanticipated grammatical, syntax, homophones, and other interpretive errors are inadvertently transcribed by the computer software. Please disregard these errors. Please excuse any errors that have escaped final proofreading.

## 2022-07-08 NOTE — ED TRIAGE NOTES
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

## 2022-10-23 ENCOUNTER — HOSPITAL ENCOUNTER (EMERGENCY)
Age: 57
Discharge: HOME | End: 2022-10-23
Attending: EMERGENCY MEDICINE

## 2022-10-23 VITALS
SYSTOLIC BLOOD PRESSURE: 150 MMHG | RESPIRATION RATE: 18 BRPM | TEMPERATURE: 98.9 F | OXYGEN SATURATION: 100 % | HEIGHT: 58 IN | HEART RATE: 80 BPM | WEIGHT: 172 LBS | DIASTOLIC BLOOD PRESSURE: 77 MMHG | BODY MASS INDEX: 36.11 KG/M2

## 2022-10-23 DIAGNOSIS — N61.0 BREAST INFECTION: Primary | ICD-10-CM

## 2022-10-23 LAB — GLUCOSE BLD STRIP.AUTO-MCNC: 105 MG/DL (ref 70–110)

## 2022-10-23 PROCEDURE — 82962 GLUCOSE BLOOD TEST: CPT

## 2022-10-23 PROCEDURE — 74011250637 HC RX REV CODE- 250/637: Performed by: EMERGENCY MEDICINE

## 2022-10-23 PROCEDURE — 99283 EMERGENCY DEPT VISIT LOW MDM: CPT

## 2022-10-23 RX ORDER — HYDROCODONE BITARTRATE AND ACETAMINOPHEN 5; 325 MG/1; MG/1
1 TABLET ORAL
Qty: 12 TABLET | Refills: 0 | Status: SHIPPED | OUTPATIENT
Start: 2022-10-23 | End: 2022-10-23 | Stop reason: SDUPTHER

## 2022-10-23 RX ORDER — IBUPROFEN 600 MG/1
600 TABLET ORAL
Qty: 18 TABLET | Refills: 0 | Status: SHIPPED | OUTPATIENT
Start: 2022-10-23 | End: 2022-10-23 | Stop reason: SDUPTHER

## 2022-10-23 RX ORDER — HYDROCODONE BITARTRATE AND ACETAMINOPHEN 5; 325 MG/1; MG/1
1 TABLET ORAL
Qty: 12 TABLET | Refills: 0 | Status: SHIPPED | OUTPATIENT
Start: 2022-10-23 | End: 2022-10-30

## 2022-10-23 RX ORDER — CLINDAMYCIN HYDROCHLORIDE 150 MG/1
300 CAPSULE ORAL
Status: COMPLETED | OUTPATIENT
Start: 2022-10-23 | End: 2022-10-23

## 2022-10-23 RX ORDER — IBUPROFEN 600 MG/1
600 TABLET ORAL
Qty: 18 TABLET | Refills: 0 | Status: SHIPPED | OUTPATIENT
Start: 2022-10-23

## 2022-10-23 RX ORDER — CLINDAMYCIN HYDROCHLORIDE 300 MG/1
300 CAPSULE ORAL 2 TIMES DAILY
Qty: 14 CAPSULE | Refills: 0 | Status: SHIPPED | OUTPATIENT
Start: 2022-10-23 | End: 2022-10-23 | Stop reason: SDUPTHER

## 2022-10-23 RX ORDER — CLINDAMYCIN HYDROCHLORIDE 300 MG/1
300 CAPSULE ORAL 2 TIMES DAILY
Qty: 14 CAPSULE | Refills: 0 | Status: SHIPPED | OUTPATIENT
Start: 2022-10-23 | End: 2022-10-30

## 2022-10-23 RX ADMIN — CLINDAMYCIN HYDROCHLORIDE 300 MG: 150 CAPSULE ORAL at 21:42

## 2022-10-23 NOTE — ED TRIAGE NOTES
Pt states onset of \"boil\" to right nipple area 1 week ago. States she has been using warm compresses. No drainage. Now c/o irritation, redness, and swelling to the skin. Denies fevers. H/o diabetes, diet controlled. Glucose at triage 105mg/dl.

## 2022-10-23 NOTE — Clinical Note
[address] [facility] EMERGENCY DEPT  [address] [address] [phone]    Work/School Note    Date: 10/23/2022    To Whom It May concern:    Katia Ramos was seen and treated today in the emergency room by the following provider(s):  Attending Provider: Katia Ramos MD.      Katia Ramos is excused from work/school on 10/23/2022 through 10/25/2022. She is medically clear to return to work/school on 10/26/2022.          Sincerely,          Honorio Negro MD

## 2022-10-23 NOTE — Clinical Note
[address] [facility] EMERGENCY DEPT  [address] [id number] [phone]    Work/School Note    Date: 10/23/2022    To Whom It May concern:    Kana Ferro was seen and treated today in the emergency room by the following provider(s):  Attending Provider: Kana Ferro MD.      Kana Ferro is excused from work/school on 10/23/2022 through 10/25/2022. She is medically clear to return to work/school on 10/26/2022.          Sincerely,          Tegan Morales MD

## 2022-10-23 NOTE — Clinical Note
[address] [facility] EMERGENCY DEPT  [address] [address]  [phone]    Work/School Note    Date: 10/23/2022    To Whom It May concern:    Kash Tee was seen and treated today in the emergency room by the following provider(s):  Attending Provider: Kash Tee MD.      Kash Tee is excused from work/school on 10/23/2022 through 10/25/2022. She is medically clear to return to work/school on 10/26/2022.          Sincerely,          Claudia Zurita RN

## 2022-10-24 NOTE — ED PROVIDER NOTES
Pt c/o rt nipple pain/redness x one week since shirt was rubbing against it, per pt . Worsening. Mild rt medial breast pain also. No drainage. Uses heat occas. No sig change. No other tx. No fever. No prior breast pain or problems. No cp or sob. No abd pain . Past Medical History:   Diagnosis Date    BMI 38.0-38.9,adult 05/19/2017    BMI 40.0-44.9, adult ([facility] [address] 05/19/2017    Obesity (BMI 35.0-39.9 without comorbidity) 04/10/2017    Osteoarthritis     Type 2 diabetes, diet controlled ([facility] [address]     Ulcer     gi ulcer       Past Surgical History:   Procedure Laterality Date    HX GYN      D&C    MS ABDOMEN SURGERY PROC UNLISTED      hernia         Family History:   Problem Relation Age of Onset    OSTEOARTHRITIS Other        Social History     Socioeconomic History    Marital status: SINGLE     Spouse name: Not on file    Number of children: Not on file    Years of education: Not on file    Highest education level: Not on file   Occupational History    Not on file   Tobacco Use    Smoking status: Some Days     Packs/day: 0.25     Years: 15.00     Pack years: 3.75     Types: Cigarettes    Smokeless tobacco: Never   Substance and Sexual Activity    Alcohol use: Yes     Comment: occasionally    Drug use: No    Sexual activity: Not on file   Other Topics Concern    Not on file   Social History Narrative    Not on file     Social Determinants of Health     Financial Resource Strain: Not on file   Food Insecurity: Not on file   Transportation Needs: Not on file   Physical Activity: Not on file   Stress: Not on file   Social Connections: Not on file   Intimate Partner Violence: Not on file   Housing Stability: Not on file         ALLERGIES: Latex and Penicillins    Review of Systems   Constitutional:  Negative for fever. Respiratory:  Negative for shortness of breath. Cardiovascular:  Negative for chest pain. Gastrointestinal:  Negative for vomiting. Skin:  Negative for rash.    Neurological:  Negative for light-headedness. All other systems reviewed and are negative. Vitals:    10/23/22 1958 10/23/22 2017   BP: (!) 150/77    Pulse: 80    Resp: 18    Temp: 98.9 °F (37.2 °C)    SpO2: 100% 100%   Weight: 78 kg (172 lb)    Height: 4' 10\" (1.473 m)             Physical Exam  Vitals and nursing note reviewed. Constitutional:       Appearance: She is well-developed. She is not diaphoretic. HENT:      Head: Normocephalic and atraumatic. Cardiovascular:      Rate and Rhythm: Normal rate and regular rhythm. Pulses: Normal pulses. Pulmonary:      Effort: Pulmonary effort is normal.      Comments: + rt nipple mild erythema/swelling. No drainage. No induration. Mild rt medial breast ttp. No mass. No induration  Musculoskeletal:         General: No tenderness. Normal range of motion. Cervical back: Normal range of motion. Skin:     General: Skin is dry. Capillary Refill: Capillary refill takes less than 2 seconds. Findings: No rash. Neurological:      Mental Status: She is alert and oriented to person, place, and time. Psychiatric:         Mood and Affect: Mood normal.        MDM         Procedures    Vitals:  Patient Vitals for the past 12 hrs:   Temp Pulse Resp BP SpO2   10/23/22 2017 -- -- -- -- 100 %   10/23/22 1958 98.9 °F (37.2 °C) 80 18 (!) 150/77 100 %         Medications ordered:   Medications   clindamycin (CLEOCIN) capsule 300 mg (has no administration in time range)         Lab findings:  Recent Results (from the past 12 hour(s))   GLUCOSE, POC    Collection Time: 10/23/22  8:08 PM   Result Value Ref Range    Glucose (POC) 105 70 - 110 mg/dL           X-Ray, CT or other radiology findings or impressions:  No orders to display             Progress notes, Consult notes or additional Procedure notes:   Pt offered needle aspiration of nipple, declines, wants trial of abx and more freq heat. To dc per d/w pt. Not c/w bactermia/sepsis. Det ret inst given.  Stressed close f/up, surgical referral given. No emc. Diagnosis:   1. Breast infection        Disposition: home    Follow-up Information       Follow up With Specialties Details Why Contact Info    [facility] EMERGENCY DEPT Emergency Medicine Go to  As needed, If symptoms worsen [address] [facility], [address]   [address] [facility] Abelardo Conteh MD Surgery Physician Schedule an appointment as soon as possible for a visit   [id number] [facility] [place]  [address]  [phone]               Patient's Medications   Start Taking    CLINDAMYCIN (CLEOCIN) 300 MG CAPSULE    Take 1 Capsule by mouth two (2) times a day for 7 days. HYDROCODONE-ACETAMINOPHEN (NORCO) 5-325 MG PER TABLET    Take 1 Tablet by mouth every six (6) hours as needed for Pain for up to 7 days. Max Daily Amount: 4 Tablets. IBUPROFEN (MOTRIN) 600 MG TABLET    Take 1 Tablet by mouth every six (6) hours as needed for Pain. Continue Taking    ATORVASTATIN (LIPITOR) 20 MG TABLET    Take 1 Tab by mouth nightly. Indications: primary prevention of heart attack    ERGOCALCIFEROL (ERGOCALCIFEROL) 50,000 UNIT CAPSULE    Take 1 Cap by mouth every seven (7) days. Indications: vitamin D deficiency    FERROUS SULFATE 325 MG (65 MG IRON) TABLET    Take  by mouth Daily (before breakfast). LOSARTAN (COZAAR) 25 MG TABLET    Take 1 Tab by mouth daily. Indications: high blood pressure    OMEPRAZOLE (PRILOSEC) 20 MG CAPSULE    Take 20 mg by mouth daily. These Medications have changed    No medications on file   Stop Taking    ACETAMINOPHEN (TYLENOL EXTRA STRENGTH) 500 MG TABLET    Take  by mouth every six (6) hours as needed for Pain. CYANOCOBALAMIN (VITAMIN B12) 100 MCG TABLET    Take 100 mcg by mouth daily. CYCLOBENZAPRINE (FLEXERIL) 10 MG TABLET    Take 1 Tablet by mouth three (3) times daily as needed for Muscle Spasm(s).     DICLOFENAC (VOLTAREN) 1 % GEL    Apply  to affected area four (4) times daily. Apply 4 g to affected area topically of the bilateral knees up to 4 times a day. METFORMIN (GLUCOPHAGE) 850 MG TABLET    Take 1 Tab by mouth two (2) times daily (with meals).  Indications: type 2 diabetes mellitus

## 2022-10-24 NOTE — DISCHARGE INSTRUCTIONS
Return for pain, swelling/redness, any fever/chills, shortness of breath, vomiting, decreased fluid intake, weakness, numbness, dizziness, or any change or concerns.

## 2022-10-24 NOTE — ED NOTES
Pt alert and oriented times 4, sitting on edge of bed, responds to verbal commands. Pt c/o of right nipple pain. Pt stated, \"My right nipple has been raw for a couple days and I have been unable to wear any clothing without it hurting. \" Noted redness, and mild abrasion of right nipple, tender to touch. Will continue to monitor pt.

## 2022-10-25 ENCOUNTER — TELEPHONE (OUTPATIENT)
Dept: SURGERY | Age: 57
End: 2022-10-25

## 2022-10-25 NOTE — TELEPHONE ENCOUNTER
Left voicemail message to contact our office to schedule an appointment with Dr. Tracey Urrutia for a follow up evaluation of breast infection per ED.

## 2023-06-05 ENCOUNTER — HOSPITAL ENCOUNTER (EMERGENCY)
Facility: HOSPITAL | Age: 58
Discharge: HOME | End: 2023-06-05
Attending: EMERGENCY MEDICINE
Payer: COMMERCIAL

## 2023-06-05 VITALS
DIASTOLIC BLOOD PRESSURE: 77 MMHG | HEART RATE: 76 BPM | SYSTOLIC BLOOD PRESSURE: 158 MMHG | HEIGHT: 58 IN | TEMPERATURE: 98.6 F | RESPIRATION RATE: 16 BRPM | OXYGEN SATURATION: 99 % | WEIGHT: 180 LBS | BODY MASS INDEX: 37.79 KG/M2

## 2023-06-05 DIAGNOSIS — L02.91 ABSCESS: Primary | ICD-10-CM

## 2023-06-05 PROCEDURE — 99283 EMERGENCY DEPT VISIT LOW MDM: CPT

## 2023-06-05 RX ORDER — TRAMADOL HYDROCHLORIDE 50 MG/1
50 TABLET ORAL EVERY 6 HOURS PRN
Qty: 8 TABLET | Refills: 0 | Status: SHIPPED | OUTPATIENT
Start: 2023-06-05 | End: 2023-06-08

## 2023-06-05 RX ORDER — TRAMADOL HYDROCHLORIDE 50 MG/1
50 TABLET ORAL EVERY 6 HOURS PRN
COMMUNITY

## 2023-06-05 RX ORDER — SULFAMETHOXAZOLE AND TRIMETHOPRIM 800; 160 MG/1; MG/1
1 TABLET ORAL 2 TIMES DAILY
Qty: 14 TABLET | Refills: 0 | Status: SHIPPED | OUTPATIENT
Start: 2023-06-05 | End: 2023-06-12

## 2023-06-05 ASSESSMENT — ENCOUNTER SYMPTOMS
EYE DISCHARGE: 0
NAUSEA: 0
VOMITING: 0
SHORTNESS OF BREATH: 0
SORE THROAT: 0
COLOR CHANGE: 1
WHEEZING: 0
ABDOMINAL DISTENTION: 0
DIARRHEA: 0

## 2023-06-05 ASSESSMENT — PAIN - FUNCTIONAL ASSESSMENT: PAIN_FUNCTIONAL_ASSESSMENT: 0-10

## 2023-06-05 ASSESSMENT — PAIN SCALES - GENERAL: PAINLEVEL_OUTOF10: 7

## 2023-06-05 NOTE — ED NOTES
Discharge instructions and prescription reviewed with patient. Pt verbalized understanding.       Kylie Bonilla RN  06/05/23 0731

## 2023-06-05 NOTE — ED TRIAGE NOTES
Pt c/o draining abscess with foul smelling drainage under right breast x 2 days.  Pt denies fever, chills, n/v/d.

## 2023-06-05 NOTE — ED PROVIDER NOTES
however I have advised her if she needs to return to the emergency department for wound check she can. Have given proper at home wound care directions. Will discharge home. MED RECONCILIATION:  No current facility-administered medications for this encounter. Current Outpatient Medications   Medication Sig    traMADol (ULTRAM) 50 MG tablet Take 1 tablet by mouth every 6 hours as needed for Pain. Max Daily Amount: 200 mg    traMADol (ULTRAM) 50 MG tablet Take 1 tablet by mouth every 6 hours as needed for Pain for up to 3 days. Intended supply: 3 days. Take lowest dose possible to manage pain Max Daily Amount: 200 mg    sulfamethoxazole-trimethoprim (BACTRIM DS) 800-160 MG per tablet Take 1 tablet by mouth 2 times daily for 7 days    atorvastatin (LIPITOR) 20 MG tablet Take 20 mg by mouth    ergocalciferol (ERGOCALCIFEROL) 1.25 MG (48464 UT) capsule Take 50,000 Units by mouth every 7 days    ferrous sulfate (IRON 325) 325 (65 Fe) MG tablet Take by mouth every morning (before breakfast)    ibuprofen (ADVIL;MOTRIN) 600 MG tablet Take 600 mg by mouth every 6 hours as needed (Patient not taking: Reported on 6/5/2023)    losartan (COZAAR) 25 MG tablet Take 25 mg by mouth daily    omeprazole (PRILOSEC) 20 MG delayed release capsule Take 20 mg by mouth daily       Disposition:  Home     DISCHARGE NOTE:   Pt has been reexamined. Patient has no new complaints, changes, or physical findings. Care plan outlined and precautions discussed. Results of workup were reviewed with the patient. All medications were reviewed with the patient. All of pt's questions and concerns were addressed. Patient was instructed and agrees to follow up with PCP as well as to return to the ED upon further deterioration. Patient is ready to go home. Medication List        START taking these medications      sulfamethoxazole-trimethoprim 800-160 MG per tablet  Commonly known as:  Bactrim DS  Take 1 tablet by mouth 2 times daily

## 2023-12-05 NOTE — ED TRIAGE NOTES
Dr. John Khoury- [facility]      Post-Operative Surgical Instructions- Total Joint Replacement      Wound Care  The dressing is waterproof, so you may shower with it on and when it is off.  7 days after surgery, your therapist may remove bandage at home unless otherwise instructed. You may leave the incision uncovered unless instructed otherwise. For Total hip replacements, if there is any skin on incision contact/rubbing, place a clean dry gauze pad in the skin fold to prevent irritation.  For total hip replacement, we recommend not wearing tight jeans or belts that can irritate the incision. We recommend looser clothing over the area and even suspenders to prevent wound irritation.  DO NOT soak the dressing/incision in a bath tub, hot tub or swimming pool.     Medications After Surgery  Take your pain medication prescribed to you from the hospital ([place] or Tramadol) every 4-6 hours as needed for pain  Start Aspirin 81mg 1 tablet by mouth, twice daily, for 6 weeks after surgery, unless another anticoagulant is prescribed, such as Xarelto, Eliquis, Coumadin, or Lovenox. Start the Aspirin the morning after your procedure.  Start Celebrex 200mg tablet, twice daily for two weeks after surgery to help with swelling and inflammation, unless there is a contraindication to NSAIDs  Start Pepcid 20mg by mouth once a day for 4 weeks after surgery for post-op nausea  DO NOT take over-the-counter anti-inflammatory medication until cleared by Dr. Khoury  as this may increase your risk of bleeding after surgery    Call the office if you experience the following:  Redness, drainage or increased swelling is noted in the operative area  Severe calf tenderness and pain with movement  Fever over 101.2? F  Nausea or Vomiting  Severe pain, not relieved by pain medication    Proper Elevation  The following is extremely beneficial for swelling and pain control:  Lay on your back, either on a bed or couch, not in a recliner, and place  Pt c/o a knot on her left upper arm that has been there for \"months\". Pain has gotten worse over the last week. Worse in the morning.  No knot palpated on exam 3-4 pillows under both feet. The goal is to have your legs elevated above the level of your heart  Elevating is to be done for 30 minutes to an hour, at a minimum of twice a day. This is strongly encouraged for the first two week after surgery, for both hip replacements and knee replacements.  During this time we also recommend icing the incision over your clothes  Compression stockings are to be worn for 2 weeks after a total HIP replacement and for 4 weeks after a Total KNEE replacement. They are to be worn during the day and removed at night.     Nausea and Constipation  The anesthesia and pain medications can cause nausea and/or constipation. It is critical for healing, as well as to prevent nausea, to have food in your stomach prior to taking any medications.   Boost or Ensure products may be helpful in these situations. To combat constipation, you should stay well hydrated with water/fluids and take over-the-counter Colace capsules twice a day after surgery.  Other helpful products include Miralax or Benefiber    Important Miscellaneous Information  No dental work for 2 weeks before surgery AND for 3 months after surgery. You are required to take an antibiotic prior to dental procedures for 2 years after joint replacement. Our office will be happy to call in a prescription for you upon request. An antibiotic is also recommended prior to any invasive procedure, such as colonoscopy or biopsy, for a lifetime.      Any further questions please contact: Gabby Lee- Clinical Lead for Dr. John Khoury at [phone] have a water resistant dressing over your incision (called prineo). The morning after your surgery, you can remove the ACE wrap and abdominal pads. Maintain prineo dressing (clear bandaid dressing over the incision). It can get wet with a quick shower. No creams, lotions, soaps on the dressing, no submersing dressing under water. It will be removed in 2 weeks in the office.

## 2024-12-01 ENCOUNTER — HOSPITAL ENCOUNTER (EMERGENCY)
Facility: HOSPITAL | Age: 59
Discharge: HOME | End: 2024-12-01
Attending: EMERGENCY MEDICINE
Payer: MEDICAID

## 2024-12-01 VITALS
HEIGHT: 58 IN | WEIGHT: 200 LBS | TEMPERATURE: 98.3 F | HEART RATE: 86 BPM | BODY MASS INDEX: 41.98 KG/M2 | OXYGEN SATURATION: 100 % | RESPIRATION RATE: 18 BRPM | DIASTOLIC BLOOD PRESSURE: 65 MMHG | SYSTOLIC BLOOD PRESSURE: 125 MMHG

## 2024-12-01 DIAGNOSIS — M54.50 LUMBOSACRAL PAIN: Primary | ICD-10-CM

## 2024-12-01 DIAGNOSIS — M54.17 LUMBOSACRAL RADICULOPATHY: ICD-10-CM

## 2024-12-01 LAB — GLUCOSE BLD STRIP.AUTO-MCNC: 123 MG/DL (ref 70–110)

## 2024-12-01 PROCEDURE — 82962 GLUCOSE BLOOD TEST: CPT

## 2024-12-01 PROCEDURE — 99283 EMERGENCY DEPT VISIT LOW MDM: CPT

## 2024-12-01 RX ORDER — METHYLPREDNISOLONE 4 MG/1
TABLET ORAL
Qty: 1 KIT | Refills: 0 | Status: SHIPPED | OUTPATIENT
Start: 2024-12-01 | End: 2024-12-07

## 2024-12-01 RX ORDER — TRAMADOL HYDROCHLORIDE 50 MG/1
50 TABLET ORAL EVERY 6 HOURS PRN
Qty: 6 TABLET | Refills: 0 | Status: SHIPPED | OUTPATIENT
Start: 2024-12-01 | End: 2024-12-04

## 2024-12-01 RX ORDER — METHOCARBAMOL 500 MG/1
500 TABLET, FILM COATED ORAL 3 TIMES DAILY PRN
Qty: 15 TABLET | Refills: 0 | Status: SHIPPED | OUTPATIENT
Start: 2024-12-01 | End: 2024-12-06

## 2024-12-01 ASSESSMENT — PAIN - FUNCTIONAL ASSESSMENT: PAIN_FUNCTIONAL_ASSESSMENT: 0-10

## 2024-12-01 ASSESSMENT — PAIN SCALES - GENERAL: PAINLEVEL_OUTOF10: 10

## 2024-12-01 NOTE — ED TRIAGE NOTES
Bilateral lower back pain with radiation into buttocks and around to hips but not down to legs.   S/s for 1 week, worsening.   Wonders if s/s began after reaching into lower cabinet in kitchen

## 2024-12-01 NOTE — ED PROVIDER NOTES
EMERGENCY DEPARTMENT HISTORY AND PHYSICAL EXAM    4:14 PM      Date: 12/1/2024  Patient Name: Essence Richardson    History of Presenting Illness     Chief Complaint   Patient presents with    Lower Back Pain       History From: Patient    Essence Richardson is a 58 y.o. female   Patient is a 58-year-old female with a history of diabetes, hypertension, elevated BMI, low back pain in the past, the presents emergency department with complaint of 1 week of low back pain that began to the lower part of her back towards her buttock and radiates down to the right buttock down to her thigh on the right.  Patient says she feels occasionally some symptoms to the left but is mostly on the right side and says it felt a bit warm to the touch but has had no fevers, chills, rash, or any drainage.  The patient has used lidocaine patches without any relief and is taking over-the-counter medication without any relief.  The patient says that often times she needs that of something stronger for pain including a muscle relaxant when she feels this way.  The patient denies any other aggravating or alleviating factors.  Patient denies any bowel or bladder incontinence and says she is been able to walk without difficulty.  Patient said her hemoglobin A1c has been good and her blood sugars also been well-controlled.           Nursing Notes were all reviewed and agreed with or any disagreements were addressed in the HPI.    PCP: Maribel Stanton APRN - NP    No current facility-administered medications for this encounter.     Current Outpatient Medications   Medication Sig Dispense Refill    methylPREDNISolone (MEDROL, KEN,) 4 MG tablet Take by mouth. 1 kit 0    traMADol (ULTRAM) 50 MG tablet Take 1 tablet by mouth every 6 hours as needed for Pain for up to 3 days. Intended supply: 3 days. Take lowest dose possible to manage pain Max Daily Amount: 200 mg 6 tablet 0    methocarbamol (ROBAXIN) 500 MG tablet Take 1 tablet by mouth 3 times daily as

## 2024-12-01 NOTE — ED NOTES
Discharge instructions provided, prescribed medications reviewed no questions or concerned via. Discharge ambulatory without assistance.

## 2025-08-24 ENCOUNTER — HOSPITAL ENCOUNTER (INPATIENT)
Age: 60
LOS: 1 days | Discharge: HOME | DRG: 812 | End: 2025-08-25
Attending: INTERNAL MEDICINE | Admitting: INTERNAL MEDICINE
Payer: MEDICARE

## 2025-08-24 ENCOUNTER — APPOINTMENT (OUTPATIENT)
Age: 60
DRG: 812 | End: 2025-08-24
Payer: MEDICARE

## 2025-08-24 DIAGNOSIS — D50.9 IRON DEFICIENCY ANEMIA, UNSPECIFIED IRON DEFICIENCY ANEMIA TYPE: Primary | ICD-10-CM

## 2025-08-24 PROBLEM — D64.9 ANEMIA: Status: ACTIVE | Noted: 2025-08-24

## 2025-08-24 LAB
ALBUMIN SERPL-MCNC: 3.5 G/DL (ref 3.4–5)
ALBUMIN/GLOB SERPL: 0.9 (ref 1–1.9)
ALP SERPL-CCNC: 66 U/L (ref 45–117)
ALT SERPL-CCNC: 15 U/L (ref 10–35)
ANION GAP SERPL CALC-SCNC: 16 MMOL/L (ref 7–16)
APTT PPP: 24.4 SEC (ref 21.7–34.2)
AST SERPL-CCNC: 21 U/L (ref 10–38)
BASOPHILS # BLD: 0.04 K/UL (ref 0–0.1)
BASOPHILS NFR BLD: 0.7 % (ref 0–2)
BILIRUB SERPL-MCNC: 0.6 MG/DL (ref 0.2–1)
BUN SERPL-MCNC: 10 MG/DL (ref 6–23)
BUN/CREAT SERPL: 17
CALCIUM SERPL-MCNC: 9.5 MG/DL (ref 8.5–10.1)
CHLORIDE SERPL-SCNC: 96 MMOL/L (ref 98–107)
CO2 SERPL-SCNC: 24 MMOL/L (ref 21–32)
CREAT SERPL-MCNC: 0.59 MG/DL (ref 0.6–1.3)
DIFFERENTIAL METHOD BLD: ABNORMAL
EOSINOPHIL # BLD: 0.16 K/UL (ref 0–0.4)
EOSINOPHIL NFR BLD: 2.7 % (ref 0–5)
ERYTHROCYTE [DISTWIDTH] IN BLOOD BY AUTOMATED COUNT: 22.2 % (ref 11.6–14.5)
GLOBULIN SER CALC-MCNC: 4.1 G/DL (ref 2.3–3.5)
GLUCOSE BLD STRIP.AUTO-MCNC: 107 MG/DL (ref 70–110)
GLUCOSE SERPL-MCNC: 113 MG/DL (ref 74–108)
HCT VFR BLD AUTO: 20.2 % (ref 35–45)
HEMOCCULT STL QL: NEGATIVE
HGB BLD-MCNC: 5.4 G/DL (ref 12–16)
HISTORY CHECK: NORMAL
IMM GRANULOCYTES # BLD AUTO: 0.06 K/UL (ref 0–0.04)
IMM GRANULOCYTES NFR BLD AUTO: 1 % (ref 0–0.5)
INR PPP: 1 (ref 0.9–1.2)
LYMPHOCYTES # BLD: 1.85 K/UL (ref 0.9–3.6)
LYMPHOCYTES NFR BLD: 31.4 % (ref 21–52)
MCH RBC QN AUTO: 14.8 PG (ref 24–34)
MCHC RBC AUTO-ENTMCNC: 26.7 G/DL (ref 31–37)
MCV RBC AUTO: 55.5 FL (ref 78–100)
MONOCYTES # BLD: 0.51 K/UL (ref 0.05–1.2)
MONOCYTES NFR BLD: 8.6 % (ref 3–10)
NEUTS SEG # BLD: 3.28 K/UL (ref 1.8–8)
NEUTS SEG NFR BLD: 55.6 % (ref 40–73)
NRBC # BLD: 0.04 K/UL (ref 0–0.01)
NRBC BLD-RTO: 0.7 PER 100 WBC
PLATELET # BLD AUTO: 423 K/UL (ref 135–420)
PMV BLD AUTO: 9.3 FL (ref 9.2–11.8)
POTASSIUM SERPL-SCNC: 4 MMOL/L (ref 3.5–5.5)
PROT SERPL-MCNC: 7.6 G/DL (ref 6.4–8.2)
PROTHROMBIN TIME: 13.5 SEC (ref 12–15.1)
RBC # BLD AUTO: 3.64 M/UL (ref 4.2–5.3)
RBC MORPH BLD: ABNORMAL
RBC MORPH BLD: ABNORMAL
SODIUM SERPL-SCNC: 136 MMOL/L (ref 136–145)
WBC # BLD AUTO: 5.9 K/UL (ref 4.6–13.2)

## 2025-08-24 PROCEDURE — 71046 X-RAY EXAM CHEST 2 VIEWS: CPT

## 2025-08-24 PROCEDURE — 2500000003 HC RX 250 WO HCPCS: Performed by: INTERNAL MEDICINE

## 2025-08-24 PROCEDURE — 86923 COMPATIBILITY TEST ELECTRIC: CPT

## 2025-08-24 PROCEDURE — 86900 BLOOD TYPING SEROLOGIC ABO: CPT

## 2025-08-24 PROCEDURE — 30233N1 TRANSFUSION OF NONAUTOLOGOUS RED BLOOD CELLS INTO PERIPHERAL VEIN, PERCUTANEOUS APPROACH: ICD-10-PCS | Performed by: INTERNAL MEDICINE

## 2025-08-24 PROCEDURE — 82962 GLUCOSE BLOOD TEST: CPT

## 2025-08-24 PROCEDURE — 82272 OCCULT BLD FECES 1-3 TESTS: CPT

## 2025-08-24 PROCEDURE — 85610 PROTHROMBIN TIME: CPT

## 2025-08-24 PROCEDURE — 6370000000 HC RX 637 (ALT 250 FOR IP): Performed by: INTERNAL MEDICINE

## 2025-08-24 PROCEDURE — 1100000000 HC RM PRIVATE

## 2025-08-24 PROCEDURE — 85025 COMPLETE CBC W/AUTO DIFF WBC: CPT

## 2025-08-24 PROCEDURE — P9016 RBC LEUKOCYTES REDUCED: HCPCS

## 2025-08-24 PROCEDURE — 86901 BLOOD TYPING SEROLOGIC RH(D): CPT

## 2025-08-24 PROCEDURE — 86850 RBC ANTIBODY SCREEN: CPT

## 2025-08-24 PROCEDURE — 36430 TRANSFUSION BLD/BLD COMPNT: CPT

## 2025-08-24 PROCEDURE — 99285 EMERGENCY DEPT VISIT HI MDM: CPT

## 2025-08-24 PROCEDURE — 74177 CT ABD & PELVIS W/CONTRAST: CPT

## 2025-08-24 PROCEDURE — 85730 THROMBOPLASTIN TIME PARTIAL: CPT

## 2025-08-24 PROCEDURE — 80053 COMPREHEN METABOLIC PANEL: CPT

## 2025-08-24 PROCEDURE — 6360000004 HC RX CONTRAST MEDICATION

## 2025-08-24 RX ORDER — ONDANSETRON 2 MG/ML
4 INJECTION INTRAMUSCULAR; INTRAVENOUS EVERY 6 HOURS PRN
Status: DISCONTINUED | OUTPATIENT
Start: 2025-08-24 | End: 2025-08-25 | Stop reason: SDUPTHER

## 2025-08-24 RX ORDER — IOPAMIDOL 612 MG/ML
100 INJECTION, SOLUTION INTRAVASCULAR
Status: COMPLETED | OUTPATIENT
Start: 2025-08-24 | End: 2025-08-24

## 2025-08-24 RX ORDER — ONDANSETRON 4 MG/1
4 TABLET, ORALLY DISINTEGRATING ORAL EVERY 8 HOURS PRN
Status: DISCONTINUED | OUTPATIENT
Start: 2025-08-24 | End: 2025-08-25 | Stop reason: SDUPTHER

## 2025-08-24 RX ORDER — SODIUM CHLORIDE 9 MG/ML
INJECTION, SOLUTION INTRAVENOUS PRN
Status: DISCONTINUED | OUTPATIENT
Start: 2025-08-24 | End: 2025-08-25 | Stop reason: HOSPADM

## 2025-08-24 RX ORDER — ACETAMINOPHEN 325 MG/1
650 TABLET ORAL EVERY 6 HOURS PRN
Status: DISCONTINUED | OUTPATIENT
Start: 2025-08-24 | End: 2025-08-25 | Stop reason: HOSPADM

## 2025-08-24 RX ORDER — SODIUM CHLORIDE 0.9 % (FLUSH) 0.9 %
5-40 SYRINGE (ML) INJECTION EVERY 12 HOURS SCHEDULED
Status: DISCONTINUED | OUTPATIENT
Start: 2025-08-24 | End: 2025-08-25 | Stop reason: HOSPADM

## 2025-08-24 RX ORDER — ACETAMINOPHEN 650 MG/1
650 SUPPOSITORY RECTAL EVERY 6 HOURS PRN
Status: DISCONTINUED | OUTPATIENT
Start: 2025-08-24 | End: 2025-08-25 | Stop reason: HOSPADM

## 2025-08-24 RX ORDER — SODIUM CHLORIDE 0.9 % (FLUSH) 0.9 %
5-40 SYRINGE (ML) INJECTION PRN
Status: DISCONTINUED | OUTPATIENT
Start: 2025-08-24 | End: 2025-08-25 | Stop reason: HOSPADM

## 2025-08-24 RX ORDER — LOSARTAN POTASSIUM 50 MG/1
50 TABLET ORAL DAILY
COMMUNITY
Start: 2025-08-16

## 2025-08-24 RX ORDER — HYDROCHLOROTHIAZIDE 25 MG/1
25 TABLET ORAL DAILY
COMMUNITY

## 2025-08-24 RX ORDER — ATORVASTATIN CALCIUM 40 MG/1
40 TABLET, FILM COATED ORAL DAILY
COMMUNITY
Start: 2025-08-16

## 2025-08-24 RX ORDER — PANTOPRAZOLE SODIUM 40 MG/1
40 TABLET, DELAYED RELEASE ORAL
Status: DISCONTINUED | OUTPATIENT
Start: 2025-08-25 | End: 2025-08-25 | Stop reason: HOSPADM

## 2025-08-24 RX ORDER — TRAMADOL HYDROCHLORIDE 50 MG/1
50 TABLET ORAL EVERY 8 HOURS PRN
COMMUNITY
Start: 2024-11-07

## 2025-08-24 RX ADMIN — SODIUM CHLORIDE, PRESERVATIVE FREE 10 ML: 5 INJECTION INTRAVENOUS at 22:13

## 2025-08-24 RX ADMIN — ACETAMINOPHEN 650 MG: 325 TABLET ORAL at 21:12

## 2025-08-24 RX ADMIN — IOPAMIDOL 100 ML: 612 INJECTION, SOLUTION INTRAVENOUS at 15:09

## 2025-08-24 ASSESSMENT — PAIN - FUNCTIONAL ASSESSMENT
PAIN_FUNCTIONAL_ASSESSMENT: 0-10

## 2025-08-24 ASSESSMENT — PAIN SCALES - GENERAL
PAINLEVEL_OUTOF10: 0
PAINLEVEL_OUTOF10: 0
PAINLEVEL_OUTOF10: 8
PAINLEVEL_OUTOF10: 2
PAINLEVEL_OUTOF10: 0

## 2025-08-24 ASSESSMENT — PAIN DESCRIPTION - ORIENTATION: ORIENTATION: RIGHT

## 2025-08-24 ASSESSMENT — PAIN DESCRIPTION - LOCATION: LOCATION: SHOULDER

## 2025-08-24 ASSESSMENT — PAIN DESCRIPTION - DESCRIPTORS: DESCRIPTORS: TIGHTNESS

## 2025-08-25 VITALS
TEMPERATURE: 98.2 F | HEART RATE: 84 BPM | RESPIRATION RATE: 17 BRPM | WEIGHT: 189 LBS | BODY MASS INDEX: 39.67 KG/M2 | DIASTOLIC BLOOD PRESSURE: 68 MMHG | OXYGEN SATURATION: 100 % | HEIGHT: 58 IN | SYSTOLIC BLOOD PRESSURE: 137 MMHG

## 2025-08-25 PROBLEM — E11.00 HYPEROSMOLAR NON-KETOTIC STATE IN PATIENT WITH TYPE 2 DIABETES MELLITUS (HCC): Status: RESOLVED | Noted: 2019-07-30 | Resolved: 2025-08-25

## 2025-08-25 PROBLEM — N28.9 ACUTE KIDNEY INSUFFICIENCY: Status: RESOLVED | Noted: 2019-07-30 | Resolved: 2025-08-25

## 2025-08-25 PROBLEM — D64.9 SYMPTOMATIC ANEMIA: Status: RESOLVED | Noted: 2018-06-19 | Resolved: 2025-08-25

## 2025-08-25 PROBLEM — E66.9 OBESITY (BMI 35.0-39.9 WITHOUT COMORBIDITY): Status: RESOLVED | Noted: 2017-04-10 | Resolved: 2025-08-25

## 2025-08-25 LAB
ABO + RH BLD: NORMAL
BASOPHILS # BLD: 0.05 K/UL (ref 0–0.1)
BASOPHILS NFR BLD: 0.6 % (ref 0–2)
BLD PROD TYP BPU: NORMAL
BLD PROD TYP BPU: NORMAL
BLOOD BANK BLOOD PRODUCT EXPIRATION DATE: NORMAL
BLOOD BANK BLOOD PRODUCT EXPIRATION DATE: NORMAL
BLOOD BANK DISPENSE STATUS: NORMAL
BLOOD BANK DISPENSE STATUS: NORMAL
BLOOD BANK ISBT PRODUCT BLOOD TYPE: 5100
BLOOD BANK ISBT PRODUCT BLOOD TYPE: 5100
BLOOD BANK UNIT TYPE AND RH: NORMAL
BLOOD BANK UNIT TYPE AND RH: NORMAL
BLOOD GROUP ANTIBODIES SERPL: NORMAL
BPU ID: NORMAL
BPU ID: NORMAL
CROSSMATCH RESULT: NORMAL
CROSSMATCH RESULT: NORMAL
DIFFERENTIAL METHOD BLD: ABNORMAL
EOSINOPHIL # BLD: 0.17 K/UL (ref 0–0.4)
EOSINOPHIL NFR BLD: 2.2 % (ref 0–5)
ERYTHROCYTE [DISTWIDTH] IN BLOOD BY AUTOMATED COUNT: 28.3 % (ref 11.6–14.5)
FOLATE SERPL-MCNC: 9.4 NG/ML (ref 4.6–34.8)
HCT VFR BLD AUTO: 28.2 % (ref 35–45)
HGB BLD-MCNC: 8.2 G/DL (ref 12–16)
IMM GRANULOCYTES # BLD AUTO: 0.08 K/UL (ref 0–0.04)
IMM GRANULOCYTES NFR BLD AUTO: 1 % (ref 0–0.5)
IRON SATN MFR SERPL: 23 %
IRON SERPL-MCNC: 109 UG/DL (ref 50–175)
LYMPHOCYTES # BLD: 1.67 K/UL (ref 0.9–3.6)
LYMPHOCYTES NFR BLD: 21.4 % (ref 21–52)
MCH RBC QN AUTO: 18.3 PG (ref 24–34)
MCHC RBC AUTO-ENTMCNC: 29.1 G/DL (ref 31–37)
MCV RBC AUTO: 62.9 FL (ref 78–100)
MONOCYTES # BLD: 0.48 K/UL (ref 0.05–1.2)
MONOCYTES NFR BLD: 6.1 % (ref 3–10)
NEUTS SEG # BLD: 5.36 K/UL (ref 1.8–8)
NEUTS SEG NFR BLD: 68.7 % (ref 40–73)
NRBC # BLD: 0.02 K/UL (ref 0–0.01)
NRBC BLD-RTO: 0.3 PER 100 WBC
PLATELET # BLD AUTO: 401 K/UL (ref 135–420)
PMV BLD AUTO: 9.5 FL (ref 9.2–11.8)
RBC # BLD AUTO: 4.48 M/UL (ref 4.2–5.3)
SPECIMEN EXP DATE BLD: NORMAL
TIBC SERPL-MCNC: 484 UG/DL (ref 250–450)
TSH W FREE THYROID IF ABNORMAL: 2.6 UIU/ML (ref 0.27–4.2)
UIBC SERPL-MCNC: 375 UG/DL (ref 112–347)
UNIT DIVISION: 0
UNIT DIVISION: 0
UNIT ISSUE DATE/TIME: NORMAL
UNIT ISSUE DATE/TIME: NORMAL
VIT B12 SERPL-MCNC: 648 PG/ML (ref 211–911)
WBC # BLD AUTO: 7.8 K/UL (ref 4.6–13.2)

## 2025-08-25 PROCEDURE — 85025 COMPLETE CBC W/AUTO DIFF WBC: CPT

## 2025-08-25 PROCEDURE — 6370000000 HC RX 637 (ALT 250 FOR IP): Performed by: INTERNAL MEDICINE

## 2025-08-25 PROCEDURE — 83540 ASSAY OF IRON: CPT

## 2025-08-25 PROCEDURE — 83020 HEMOGLOBIN ELECTROPHORESIS: CPT

## 2025-08-25 PROCEDURE — 82746 ASSAY OF FOLIC ACID SERUM: CPT

## 2025-08-25 PROCEDURE — 84443 ASSAY THYROID STIM HORMONE: CPT

## 2025-08-25 PROCEDURE — 83550 IRON BINDING TEST: CPT

## 2025-08-25 PROCEDURE — 2500000003 HC RX 250 WO HCPCS: Performed by: INTERNAL MEDICINE

## 2025-08-25 PROCEDURE — 6360000002 HC RX W HCPCS: Performed by: INTERNAL MEDICINE

## 2025-08-25 PROCEDURE — 82607 VITAMIN B-12: CPT

## 2025-08-25 RX ORDER — TRAMADOL HYDROCHLORIDE 50 MG/1
50 TABLET ORAL EVERY 8 HOURS PRN
Status: DISCONTINUED | OUTPATIENT
Start: 2025-08-25 | End: 2025-08-25 | Stop reason: HOSPADM

## 2025-08-25 RX ORDER — B12/LEVOMEFOLATE CALCIUM/B-6 2-1.13-25
1 TABLET ORAL DAILY
Status: DISCONTINUED | OUTPATIENT
Start: 2025-08-25 | End: 2025-08-25 | Stop reason: HOSPADM

## 2025-08-25 RX ORDER — ONDANSETRON 2 MG/ML
4 INJECTION INTRAMUSCULAR; INTRAVENOUS EVERY 6 HOURS PRN
Status: DISCONTINUED | OUTPATIENT
Start: 2025-08-25 | End: 2025-08-25 | Stop reason: HOSPADM

## 2025-08-25 RX ORDER — ONDANSETRON 4 MG/1
4 TABLET, ORALLY DISINTEGRATING ORAL EVERY 8 HOURS PRN
Status: DISCONTINUED | OUTPATIENT
Start: 2025-08-25 | End: 2025-08-25 | Stop reason: HOSPADM

## 2025-08-25 RX ORDER — B12/LEVOMEFOLATE CALCIUM/B-6 2-1.13-25
1 TABLET ORAL DAILY
Qty: 30 TABLET | Refills: 1 | Status: SHIPPED | OUTPATIENT
Start: 2025-08-26

## 2025-08-25 RX ORDER — ATORVASTATIN CALCIUM 40 MG/1
40 TABLET, FILM COATED ORAL NIGHTLY
Status: DISCONTINUED | OUTPATIENT
Start: 2025-08-25 | End: 2025-08-25 | Stop reason: HOSPADM

## 2025-08-25 RX ADMIN — PANTOPRAZOLE SODIUM 40 MG: 40 TABLET, DELAYED RELEASE ORAL at 08:53

## 2025-08-25 RX ADMIN — ACETAMINOPHEN 650 MG: 325 TABLET ORAL at 11:06

## 2025-08-25 RX ADMIN — IRON SUCROSE 100 MG: 20 INJECTION, SOLUTION INTRAVENOUS at 10:57

## 2025-08-25 RX ADMIN — SODIUM CHLORIDE, PRESERVATIVE FREE 10 ML: 5 INJECTION INTRAVENOUS at 08:53

## 2025-08-25 RX ADMIN — Medication 1 TABLET: at 12:32

## 2025-08-25 ASSESSMENT — PAIN SCALES - GENERAL: PAINLEVEL_OUTOF10: 4

## 2025-08-25 ASSESSMENT — PAIN - FUNCTIONAL ASSESSMENT: PAIN_FUNCTIONAL_ASSESSMENT: 0-10

## 2025-08-25 ASSESSMENT — PAIN DESCRIPTION - LOCATION: LOCATION: HEAD

## 2025-08-27 LAB
HEMOGLOBIN A2: ABNORMAL % (ref 1.8–3.2)
HEMOGLOBIN A: 80.5 % (ref 96.4–98.8)
HEMOGLOBIN C: 0 %
HEMOGLOBIN E%: 0 %
HEMOGLOBIN F: 0 % (ref 0–2)
HEMOGLOBIN S: 17.4 %
HEMOGLOBIN VARIANT: 0 %
HGB A MFR BLD: NORMAL % (ref 96.4–98.8)
HGB A2 MFR BLD COLUMN CHROM: 2.1 % (ref 1.8–3.2)
HGB F MFR BLD: NORMAL % (ref 0–2)
HGB FRACT BLD-IMP: NORMAL
HGB S MFR BLD: NORMAL %
HGB SOLUBILITY: POSITIVE
INTERPRETATION: ABNORMAL